# Patient Record
Sex: MALE | Race: OTHER | HISPANIC OR LATINO | URBAN - METROPOLITAN AREA
[De-identification: names, ages, dates, MRNs, and addresses within clinical notes are randomized per-mention and may not be internally consistent; named-entity substitution may affect disease eponyms.]

---

## 2017-01-18 ENCOUNTER — EMERGENCY (EMERGENCY)
Facility: HOSPITAL | Age: 22
LOS: 1 days | Discharge: LEFT BEFORE DISCHARGE | End: 2017-01-18
Attending: EMERGENCY MEDICINE
Payer: SELF-PAY

## 2017-01-18 VITALS
TEMPERATURE: 98 F | RESPIRATION RATE: 24 BRPM | HEART RATE: 119 BPM | SYSTOLIC BLOOD PRESSURE: 144 MMHG | DIASTOLIC BLOOD PRESSURE: 116 MMHG | HEIGHT: 69 IN | OXYGEN SATURATION: 96 % | WEIGHT: 119.93 LBS

## 2017-01-18 DIAGNOSIS — R10.13 EPIGASTRIC PAIN: ICD-10-CM

## 2017-01-18 DIAGNOSIS — R10.9 UNSPECIFIED ABDOMINAL PAIN: ICD-10-CM

## 2017-01-18 DIAGNOSIS — S52.209A UNSPECIFIED FRACTURE OF SHAFT OF UNSPECIFIED ULNA, INITIAL ENCOUNTER FOR CLOSED FRACTURE: Chronic | ICD-10-CM

## 2017-01-18 DIAGNOSIS — Z90.49 ACQUIRED ABSENCE OF OTHER SPECIFIED PARTS OF DIGESTIVE TRACT: Chronic | ICD-10-CM

## 2017-01-18 DIAGNOSIS — Z90.49 ACQUIRED ABSENCE OF OTHER SPECIFIED PARTS OF DIGESTIVE TRACT: ICD-10-CM

## 2017-01-18 LAB
ALBUMIN SERPL ELPH-MCNC: 5 G/DL — SIGNIFICANT CHANGE UP (ref 3.3–5.2)
ALP SERPL-CCNC: 156 U/L — HIGH (ref 40–120)
ALT FLD-CCNC: 11 U/L — SIGNIFICANT CHANGE UP
ANION GAP SERPL CALC-SCNC: 18 MMOL/L — HIGH (ref 5–17)
AST SERPL-CCNC: 34 U/L — SIGNIFICANT CHANGE UP
BASOPHILS # BLD AUTO: 0 K/UL — SIGNIFICANT CHANGE UP (ref 0–0.2)
BASOPHILS NFR BLD AUTO: 0.1 % — SIGNIFICANT CHANGE UP (ref 0–2)
BILIRUB SERPL-MCNC: 0.4 MG/DL — SIGNIFICANT CHANGE UP (ref 0.4–2)
BUN SERPL-MCNC: 12 MG/DL — SIGNIFICANT CHANGE UP (ref 8–20)
CALCIUM SERPL-MCNC: 9.9 MG/DL — SIGNIFICANT CHANGE UP (ref 8.6–10.2)
CHLORIDE SERPL-SCNC: 101 MMOL/L — SIGNIFICANT CHANGE UP (ref 98–107)
CO2 SERPL-SCNC: 22 MMOL/L — SIGNIFICANT CHANGE UP (ref 22–29)
CREAT SERPL-MCNC: 0.61 MG/DL — SIGNIFICANT CHANGE UP (ref 0.5–1.3)
ETHANOL SERPL-MCNC: <10 MG/DL — SIGNIFICANT CHANGE UP
GLUCOSE SERPL-MCNC: 110 MG/DL — SIGNIFICANT CHANGE UP (ref 70–115)
HCT VFR BLD CALC: 44 % — SIGNIFICANT CHANGE UP (ref 42–52)
HGB BLD-MCNC: 15.2 G/DL — SIGNIFICANT CHANGE UP (ref 14–18)
LIDOCAIN IGE QN: 16 U/L — LOW (ref 22–51)
LYMPHOCYTES # BLD AUTO: 11.8 % — LOW (ref 20–55)
LYMPHOCYTES # BLD AUTO: 2.3 K/UL — SIGNIFICANT CHANGE UP (ref 1–4.8)
MAGNESIUM SERPL-MCNC: 1.9 MG/DL — SIGNIFICANT CHANGE UP (ref 1.8–2.5)
MCHC RBC-ENTMCNC: 30 PG — SIGNIFICANT CHANGE UP (ref 27–31)
MCHC RBC-ENTMCNC: 34.5 G/DL — SIGNIFICANT CHANGE UP (ref 32–36)
MCV RBC AUTO: 87 FL — SIGNIFICANT CHANGE UP (ref 80–94)
MONOCYTES # BLD AUTO: 0 K/UL — SIGNIFICANT CHANGE UP (ref 0–0.8)
MONOCYTES NFR BLD AUTO: 0.1 % — LOW (ref 3–10)
NEUTROPHILS # BLD AUTO: 16.8 K/UL — HIGH (ref 1.8–8)
NEUTROPHILS NFR BLD AUTO: 87.6 % — HIGH (ref 37–73)
PLATELET # BLD AUTO: 258 K/UL — SIGNIFICANT CHANGE UP (ref 150–400)
POTASSIUM SERPL-MCNC: 3.8 MMOL/L — SIGNIFICANT CHANGE UP (ref 3.5–5.3)
POTASSIUM SERPL-SCNC: 3.8 MMOL/L — SIGNIFICANT CHANGE UP (ref 3.5–5.3)
PROT SERPL-MCNC: 8 G/DL — SIGNIFICANT CHANGE UP (ref 6.6–8.7)
RBC # BLD: 5.06 M/UL — SIGNIFICANT CHANGE UP (ref 4.6–6.2)
RBC # FLD: 16.5 % — HIGH (ref 11–15.6)
SODIUM SERPL-SCNC: 141 MMOL/L — SIGNIFICANT CHANGE UP (ref 135–145)
WBC # BLD: 19.22 K/UL — HIGH (ref 4.8–10.8)
WBC # FLD AUTO: 19.22 K/UL — HIGH (ref 4.8–10.8)

## 2017-01-18 PROCEDURE — 71020: CPT | Mod: 26

## 2017-01-18 PROCEDURE — 74176 CT ABD & PELVIS W/O CONTRAST: CPT | Mod: 26

## 2017-01-18 PROCEDURE — 99285 EMERGENCY DEPT VISIT HI MDM: CPT

## 2017-01-18 RX ORDER — FAMOTIDINE 10 MG/ML
20 INJECTION INTRAVENOUS ONCE
Qty: 0 | Refills: 0 | Status: COMPLETED | OUTPATIENT
Start: 2017-01-18 | End: 2017-01-18

## 2017-01-18 RX ORDER — DIPHENHYDRAMINE HCL 50 MG
25 CAPSULE ORAL ONCE
Qty: 0 | Refills: 0 | Status: COMPLETED | OUTPATIENT
Start: 2017-01-18 | End: 2017-01-18

## 2017-01-18 RX ORDER — THIAMINE MONONITRATE (VIT B1) 100 MG
100 TABLET ORAL ONCE
Qty: 0 | Refills: 0 | Status: COMPLETED | OUTPATIENT
Start: 2017-01-18 | End: 2017-01-18

## 2017-01-18 RX ORDER — SODIUM CHLORIDE 9 MG/ML
2000 INJECTION INTRAMUSCULAR; INTRAVENOUS; SUBCUTANEOUS ONCE
Qty: 0 | Refills: 0 | Status: COMPLETED | OUTPATIENT
Start: 2017-01-18 | End: 2017-01-18

## 2017-01-18 RX ORDER — SODIUM CHLORIDE 9 MG/ML
1000 INJECTION INTRAMUSCULAR; INTRAVENOUS; SUBCUTANEOUS
Qty: 0 | Refills: 0 | Status: DISCONTINUED | OUTPATIENT
Start: 2017-01-18 | End: 2017-01-22

## 2017-01-18 RX ORDER — HALOPERIDOL DECANOATE 100 MG/ML
5 INJECTION INTRAMUSCULAR ONCE
Qty: 0 | Refills: 0 | Status: COMPLETED | OUTPATIENT
Start: 2017-01-18 | End: 2017-01-18

## 2017-01-18 RX ORDER — KETOROLAC TROMETHAMINE 30 MG/ML
30 SYRINGE (ML) INJECTION ONCE
Qty: 0 | Refills: 0 | Status: DISCONTINUED | OUTPATIENT
Start: 2017-01-18 | End: 2017-01-18

## 2017-01-18 RX ADMIN — Medication 1 MILLIGRAM(S): at 14:16

## 2017-01-18 RX ADMIN — FAMOTIDINE 20 MILLIGRAM(S): 10 INJECTION INTRAVENOUS at 14:16

## 2017-01-18 RX ADMIN — Medication 100 MILLIGRAM(S): at 17:10

## 2017-01-18 RX ADMIN — Medication 0.1 MILLIGRAM(S): at 23:51

## 2017-01-18 RX ADMIN — SODIUM CHLORIDE 200 MILLILITER(S): 9 INJECTION INTRAMUSCULAR; INTRAVENOUS; SUBCUTANEOUS at 14:16

## 2017-01-18 RX ADMIN — SODIUM CHLORIDE 1000 MILLILITER(S): 9 INJECTION INTRAMUSCULAR; INTRAVENOUS; SUBCUTANEOUS at 23:52

## 2017-01-18 RX ADMIN — HALOPERIDOL DECANOATE 5 MILLIGRAM(S): 100 INJECTION INTRAMUSCULAR at 15:18

## 2017-01-18 RX ADMIN — Medication 25 MILLIGRAM(S): at 15:18

## 2017-01-18 NOTE — ED ADULT NURSE REASSESSMENT NOTE - NS ED NURSE REASSESS COMMENT FT1
Pt returned from CT scan, radiology nurse states pt unable to tolerate CT scan due to vomiting and agitation. Pt easily arousable upon ED arrival in no apparent distress. MD at bedside.

## 2017-01-18 NOTE — ED STATDOCS - NS ED MD SCRIBE ATTENDING SCRIBE SECTIONS
VITAL SIGNS( Pullset)/PHYSICAL EXAM/HISTORY OF PRESENT ILLNESS/PAST MEDICAL/SURGICAL/SOCIAL HISTORY/HIV/REVIEW OF SYSTEMS/DISPOSITION

## 2017-01-18 NOTE — ED ADULT NURSE REASSESSMENT NOTE - NS ED NURSE REASSESS COMMENT FT1
pt pulled IV out, benadryl and haldol given emergently IM for aggression and combative behavior as per ER MD Can

## 2017-01-18 NOTE — ED STATDOCS - OBJECTIVE STATEMENT
22 y/o male awoke this morning with severe epigastric pain. No n/v/d. Last BM while in waiting room. Pt denies smoking or drinking, but does use marijuana. On further inspection of pt charts h/o EtOH abuse. Pt reports PMHx ulcers and pancreatitis.

## 2017-01-18 NOTE — ED STATDOCS - PROGRESS NOTE DETAILS
20 y/o male presented to ST procedure room screaming in pain, throwing himself on the floor  and requesting Dilaudid for pain. He reports toradol and morphine will make the pain worse. PT reporting severe epigastric pain. PT care was transferred to Dr. Amaro in the Main ED. pt still complaining of abdominal despite negative CT scan. Pt admits to recently been taking percocet and states that he recently ran out. Father gave phone number: [phone]

## 2017-01-18 NOTE — ED STATDOCS - CONSTITUTIONAL, MLM
normal... Pt attempted to climb the wall. After coaxing pt back onto the bed, pt is hyperventilating and difficult to examine.

## 2017-01-18 NOTE — ED ADULT NURSE NOTE - OBJECTIVE STATEMENT
Pt axox3 speaks english and understands well presenting to the ER with 10/10 epigastric pain. Pt is extremely erratic and agitated, trying to hanging off of light fixture and holding onto the walls, screaming. Pt states when ever he has pain like this at [facility] they give him Dilaudid. Pt offered Toradol and refusing, stating  it makes him "crazy". Abdomen flat, and tender to touch in epigastric area.

## 2017-01-19 VITALS
OXYGEN SATURATION: 96 % | DIASTOLIC BLOOD PRESSURE: 80 MMHG | SYSTOLIC BLOOD PRESSURE: 138 MMHG | TEMPERATURE: 98 F | HEART RATE: 105 BPM | RESPIRATION RATE: 20 BRPM

## 2017-01-19 LAB
AMPHET UR-MCNC: NEGATIVE — SIGNIFICANT CHANGE UP
APPEARANCE UR: CLEAR — SIGNIFICANT CHANGE UP
BARBITURATES UR SCN-MCNC: NEGATIVE — SIGNIFICANT CHANGE UP
BENZODIAZ UR-MCNC: POSITIVE
BILIRUB UR-MCNC: NEGATIVE — SIGNIFICANT CHANGE UP
COCAINE METAB.OTHER UR-MCNC: NEGATIVE — SIGNIFICANT CHANGE UP
COLOR SPEC: YELLOW — SIGNIFICANT CHANGE UP
DIFF PNL FLD: NEGATIVE — SIGNIFICANT CHANGE UP
GLUCOSE UR QL: NEGATIVE MG/DL — SIGNIFICANT CHANGE UP
HCT VFR BLD CALC: 41.6 % — LOW (ref 42–52)
HGB BLD-MCNC: 14 G/DL — SIGNIFICANT CHANGE UP (ref 14–18)
KETONES UR-MCNC: NEGATIVE — SIGNIFICANT CHANGE UP
LEUKOCYTE ESTERASE UR-ACNC: NEGATIVE — SIGNIFICANT CHANGE UP
LYMPHOCYTES # BLD AUTO: 1.6 K/UL — SIGNIFICANT CHANGE UP (ref 1–4.8)
LYMPHOCYTES # BLD AUTO: 11.4 % — LOW (ref 20–55)
MCHC RBC-ENTMCNC: 29.9 PG — SIGNIFICANT CHANGE UP (ref 27–31)
MCHC RBC-ENTMCNC: 33.7 G/DL — SIGNIFICANT CHANGE UP (ref 32–36)
MCV RBC AUTO: 88.7 FL — SIGNIFICANT CHANGE UP (ref 80–94)
METHADONE UR-MCNC: NEGATIVE — SIGNIFICANT CHANGE UP
MONOCYTES # BLD AUTO: 0.9 K/UL — HIGH (ref 0–0.8)
MONOCYTES NFR BLD AUTO: 6.5 % — SIGNIFICANT CHANGE UP (ref 3–10)
NEUTROPHILS # BLD AUTO: 11.2 K/UL — HIGH (ref 1.8–8)
NEUTROPHILS NFR BLD AUTO: 81.9 % — HIGH (ref 37–73)
NITRITE UR-MCNC: NEGATIVE — SIGNIFICANT CHANGE UP
OPIATES UR-MCNC: NEGATIVE — SIGNIFICANT CHANGE UP
PCP SPEC-MCNC: SIGNIFICANT CHANGE UP
PCP UR-MCNC: NEGATIVE — SIGNIFICANT CHANGE UP
PH UR: 6 — SIGNIFICANT CHANGE UP (ref 4.8–8)
PLATELET # BLD AUTO: 220 K/UL — SIGNIFICANT CHANGE UP (ref 150–400)
PROT UR-MCNC: NEGATIVE MG/DL — SIGNIFICANT CHANGE UP
RBC # BLD: 4.69 M/UL — SIGNIFICANT CHANGE UP (ref 4.6–6.2)
RBC # FLD: 17.1 % — HIGH (ref 11–15.6)
SP GR SPEC: 1.02 — SIGNIFICANT CHANGE UP (ref 1.01–1.02)
THC UR QL: POSITIVE
UROBILINOGEN FLD QL: NEGATIVE MG/DL — SIGNIFICANT CHANGE UP
WBC # BLD: 13.63 K/UL — HIGH (ref 4.8–10.8)
WBC # FLD AUTO: 13.63 K/UL — HIGH (ref 4.8–10.8)

## 2017-01-19 PROCEDURE — 76705 ECHO EXAM OF ABDOMEN: CPT | Mod: 26

## 2017-01-19 RX ADMIN — Medication 20 MILLIGRAM(S): at 07:29

## 2017-01-19 RX ADMIN — SODIUM CHLORIDE 200 MILLILITER(S): 9 INJECTION INTRAMUSCULAR; INTRAVENOUS; SUBCUTANEOUS at 07:31

## 2017-01-19 RX ADMIN — Medication 30 MILLILITER(S): at 07:29

## 2017-01-19 RX ADMIN — SODIUM CHLORIDE 200 MILLILITER(S): 9 INJECTION INTRAMUSCULAR; INTRAVENOUS; SUBCUTANEOUS at 00:41

## 2017-01-19 NOTE — ED PROVIDER NOTE - PROGRESS NOTE DETAILS
Pt awake and alert at his time c/o recurrent pain.  Pt now states that he has not taken any Percocet in 3 months.  On re-exam abd scaphoid with mild epigastric tenderness to palp. Pt requesting IV narcotics, "just one dose".  Will give Maalox and Bentyl and re-eval s/o to me severe upper abd pain, vomiting unclear etiology, patient previously given Gi refer rodo-has info at home but hasn't followed up - after US patient says he wasn't to leave and s/o AMA because he has a ride home

## 2017-01-19 NOTE — ED PROVIDER NOTE - OBJECTIVE STATEMENT
22 yo pmh etoh abuse, pancreatitis comes to ed with abdominal pain and vomiting; pt requesting pain medication;denies fever, chills, and diarrhea;

## 2017-06-03 ENCOUNTER — INPATIENT (INPATIENT)
Facility: HOSPITAL | Age: 22
LOS: 3 days | Discharge: HOME HEALTH | DRG: 392 | End: 2017-06-07
Attending: INTERNAL MEDICINE | Admitting: HOSPITALIST
Payer: MEDICAID

## 2017-06-03 VITALS
DIASTOLIC BLOOD PRESSURE: 80 MMHG | HEIGHT: 66 IN | WEIGHT: 145.06 LBS | RESPIRATION RATE: 16 BRPM | OXYGEN SATURATION: 99 % | HEART RATE: 82 BPM | SYSTOLIC BLOOD PRESSURE: 136 MMHG | TEMPERATURE: 97 F

## 2017-06-03 DIAGNOSIS — S52.209A UNSPECIFIED FRACTURE OF SHAFT OF UNSPECIFIED ULNA, INITIAL ENCOUNTER FOR CLOSED FRACTURE: Chronic | ICD-10-CM

## 2017-06-03 DIAGNOSIS — Z90.49 ACQUIRED ABSENCE OF OTHER SPECIFIED PARTS OF DIGESTIVE TRACT: Chronic | ICD-10-CM

## 2017-06-03 DIAGNOSIS — R10.9 UNSPECIFIED ABDOMINAL PAIN: ICD-10-CM

## 2017-06-03 LAB
ALBUMIN SERPL ELPH-MCNC: 5.2 G/DL — SIGNIFICANT CHANGE UP (ref 3.3–5.2)
ALP SERPL-CCNC: 134 U/L — HIGH (ref 40–120)
ALT FLD-CCNC: 14 U/L — SIGNIFICANT CHANGE UP
AMPHET UR-MCNC: NEGATIVE — SIGNIFICANT CHANGE UP
ANION GAP SERPL CALC-SCNC: 21 MMOL/L — HIGH (ref 5–17)
APPEARANCE UR: CLEAR — SIGNIFICANT CHANGE UP
AST SERPL-CCNC: 52 U/L — HIGH
BARBITURATES UR SCN-MCNC: NEGATIVE — SIGNIFICANT CHANGE UP
BENZODIAZ UR-MCNC: NEGATIVE — SIGNIFICANT CHANGE UP
BILIRUB SERPL-MCNC: 0.7 MG/DL — SIGNIFICANT CHANGE UP (ref 0.4–2)
BILIRUB UR-MCNC: NEGATIVE — SIGNIFICANT CHANGE UP
BUN SERPL-MCNC: 14 MG/DL — SIGNIFICANT CHANGE UP (ref 8–20)
CALCIUM SERPL-MCNC: 10.2 MG/DL — SIGNIFICANT CHANGE UP (ref 8.6–10.2)
CHLORIDE SERPL-SCNC: 98 MMOL/L — SIGNIFICANT CHANGE UP (ref 98–107)
CO2 SERPL-SCNC: 20 MMOL/L — LOW (ref 22–29)
COCAINE METAB.OTHER UR-MCNC: NEGATIVE — SIGNIFICANT CHANGE UP
COLOR SPEC: YELLOW — SIGNIFICANT CHANGE UP
CREAT SERPL-MCNC: 0.59 MG/DL — SIGNIFICANT CHANGE UP (ref 0.5–1.3)
DIFF PNL FLD: NEGATIVE — SIGNIFICANT CHANGE UP
EPI CELLS # UR: SIGNIFICANT CHANGE UP
GLUCOSE SERPL-MCNC: 131 MG/DL — HIGH (ref 70–115)
GLUCOSE UR QL: NEGATIVE MG/DL — SIGNIFICANT CHANGE UP
HCT VFR BLD CALC: 42.4 % — SIGNIFICANT CHANGE UP (ref 42–52)
HCT VFR BLD CALC: 44.5 % — SIGNIFICANT CHANGE UP (ref 42–52)
HGB BLD-MCNC: 14.9 G/DL — SIGNIFICANT CHANGE UP (ref 14–18)
HGB BLD-MCNC: 15.9 G/DL — SIGNIFICANT CHANGE UP (ref 14–18)
KETONES UR-MCNC: ABNORMAL
LEUKOCYTE ESTERASE UR-ACNC: NEGATIVE — SIGNIFICANT CHANGE UP
LIDOCAIN IGE QN: 10 U/L — LOW (ref 22–51)
MCHC RBC-ENTMCNC: 31.6 PG — HIGH (ref 27–31)
MCHC RBC-ENTMCNC: 32.1 PG — HIGH (ref 27–31)
MCHC RBC-ENTMCNC: 35.1 G/DL — SIGNIFICANT CHANGE UP (ref 32–36)
MCHC RBC-ENTMCNC: 35.7 G/DL — SIGNIFICANT CHANGE UP (ref 32–36)
MCV RBC AUTO: 89.9 FL — SIGNIFICANT CHANGE UP (ref 80–94)
MCV RBC AUTO: 90 FL — SIGNIFICANT CHANGE UP (ref 80–94)
METHADONE UR-MCNC: NEGATIVE — SIGNIFICANT CHANGE UP
NITRITE UR-MCNC: NEGATIVE — SIGNIFICANT CHANGE UP
OPIATES UR-MCNC: NEGATIVE — SIGNIFICANT CHANGE UP
PCP SPEC-MCNC: SIGNIFICANT CHANGE UP
PCP UR-MCNC: NEGATIVE — SIGNIFICANT CHANGE UP
PH UR: 7 — SIGNIFICANT CHANGE UP (ref 5–8)
PLATELET # BLD AUTO: 230 K/UL — SIGNIFICANT CHANGE UP (ref 150–400)
PLATELET # BLD AUTO: 289 K/UL — SIGNIFICANT CHANGE UP (ref 150–400)
POTASSIUM SERPL-MCNC: 4.4 MMOL/L — SIGNIFICANT CHANGE UP (ref 3.5–5.3)
POTASSIUM SERPL-SCNC: 4.4 MMOL/L — SIGNIFICANT CHANGE UP (ref 3.5–5.3)
PROT SERPL-MCNC: 8.4 G/DL — SIGNIFICANT CHANGE UP (ref 6.6–8.7)
PROT UR-MCNC: 15 MG/DL
RBC # BLD: 4.71 M/UL — SIGNIFICANT CHANGE UP (ref 4.6–6.2)
RBC # BLD: 4.95 M/UL — SIGNIFICANT CHANGE UP (ref 4.6–6.2)
RBC # FLD: 14.3 % — SIGNIFICANT CHANGE UP (ref 11–15.6)
RBC # FLD: 14.4 % — SIGNIFICANT CHANGE UP (ref 11–15.6)
RBC CASTS # UR COMP ASSIST: NEGATIVE /HPF — SIGNIFICANT CHANGE UP (ref 0–4)
SODIUM SERPL-SCNC: 139 MMOL/L — SIGNIFICANT CHANGE UP (ref 135–145)
SP GR SPEC: 1 — LOW (ref 1.01–1.02)
THC UR QL: POSITIVE
UROBILINOGEN FLD QL: NEGATIVE MG/DL — SIGNIFICANT CHANGE UP
WBC # BLD: 25.2 K/UL — HIGH (ref 4.8–10.8)
WBC # BLD: 28.9 K/UL — HIGH (ref 4.8–10.8)
WBC # FLD AUTO: 25.2 K/UL — HIGH (ref 4.8–10.8)
WBC # FLD AUTO: 28.9 K/UL — HIGH (ref 4.8–10.8)
WBC UR QL: NEGATIVE — SIGNIFICANT CHANGE UP

## 2017-06-03 PROCEDURE — 74177 CT ABD & PELVIS W/CONTRAST: CPT | Mod: 26

## 2017-06-03 PROCEDURE — 71010: CPT | Mod: 26

## 2017-06-03 PROCEDURE — 99284 EMERGENCY DEPT VISIT MOD MDM: CPT

## 2017-06-03 RX ORDER — ONDANSETRON 8 MG/1
4 TABLET, FILM COATED ORAL ONCE
Qty: 0 | Refills: 0 | Status: COMPLETED | OUTPATIENT
Start: 2017-06-03 | End: 2017-06-03

## 2017-06-03 RX ORDER — HYDROMORPHONE HYDROCHLORIDE 2 MG/ML
1 INJECTION INTRAMUSCULAR; INTRAVENOUS; SUBCUTANEOUS ONCE
Qty: 0 | Refills: 0 | Status: DISCONTINUED | OUTPATIENT
Start: 2017-06-03 | End: 2017-06-03

## 2017-06-03 RX ORDER — SODIUM CHLORIDE 9 MG/ML
1000 INJECTION INTRAMUSCULAR; INTRAVENOUS; SUBCUTANEOUS ONCE
Qty: 0 | Refills: 0 | Status: COMPLETED | OUTPATIENT
Start: 2017-06-03 | End: 2017-06-03

## 2017-06-03 RX ORDER — KETOROLAC TROMETHAMINE 30 MG/ML
30 SYRINGE (ML) INJECTION ONCE
Qty: 0 | Refills: 0 | Status: DISCONTINUED | OUTPATIENT
Start: 2017-06-03 | End: 2017-06-03

## 2017-06-03 RX ORDER — PANTOPRAZOLE SODIUM 20 MG/1
40 TABLET, DELAYED RELEASE ORAL EVERY 12 HOURS
Qty: 0 | Refills: 0 | Status: DISCONTINUED | OUTPATIENT
Start: 2017-06-03 | End: 2017-06-07

## 2017-06-03 RX ORDER — METOCLOPRAMIDE HCL 10 MG
10 TABLET ORAL ONCE
Qty: 0 | Refills: 0 | Status: COMPLETED | OUTPATIENT
Start: 2017-06-03 | End: 2017-06-03

## 2017-06-03 RX ORDER — FAMOTIDINE 10 MG/ML
20 INJECTION INTRAVENOUS ONCE
Qty: 0 | Refills: 0 | Status: COMPLETED | OUTPATIENT
Start: 2017-06-03 | End: 2017-06-03

## 2017-06-03 RX ADMIN — Medication 1 MILLIGRAM(S): at 16:18

## 2017-06-03 RX ADMIN — Medication 30 MILLIGRAM(S): at 15:45

## 2017-06-03 RX ADMIN — HYDROMORPHONE HYDROCHLORIDE 1 MILLIGRAM(S): 2 INJECTION INTRAMUSCULAR; INTRAVENOUS; SUBCUTANEOUS at 15:45

## 2017-06-03 RX ADMIN — ONDANSETRON 4 MILLIGRAM(S): 8 TABLET, FILM COATED ORAL at 15:45

## 2017-06-03 RX ADMIN — HYDROMORPHONE HYDROCHLORIDE 1 MILLIGRAM(S): 2 INJECTION INTRAMUSCULAR; INTRAVENOUS; SUBCUTANEOUS at 16:18

## 2017-06-03 RX ADMIN — HYDROMORPHONE HYDROCHLORIDE 1 MILLIGRAM(S): 2 INJECTION INTRAMUSCULAR; INTRAVENOUS; SUBCUTANEOUS at 16:30

## 2017-06-03 RX ADMIN — ONDANSETRON 4 MILLIGRAM(S): 8 TABLET, FILM COATED ORAL at 17:00

## 2017-06-03 RX ADMIN — SODIUM CHLORIDE 500 MILLILITER(S): 9 INJECTION INTRAMUSCULAR; INTRAVENOUS; SUBCUTANEOUS at 15:45

## 2017-06-03 RX ADMIN — Medication 30 MILLIGRAM(S): at 16:18

## 2017-06-03 RX ADMIN — Medication 10 MILLIGRAM(S): at 22:26

## 2017-06-03 RX ADMIN — Medication 1 MILLIGRAM(S): at 17:30

## 2017-06-03 RX ADMIN — HYDROMORPHONE HYDROCHLORIDE 1 MILLIGRAM(S): 2 INJECTION INTRAMUSCULAR; INTRAVENOUS; SUBCUTANEOUS at 22:21

## 2017-06-03 RX ADMIN — FAMOTIDINE 20 MILLIGRAM(S): 10 INJECTION INTRAVENOUS at 15:45

## 2017-06-03 NOTE — ED ADULT NURSE NOTE - OBJECTIVE STATEMENT
pt screaming, groaning, hanging from poles on ceiling. anxious, diaphoretic, agitated. "I AM HAVING PAIN TO THE TOP OF MY STOMACH." pt has dried vomit on clothing. pacing in hallway. a and o x3, reports bearing down make him feel better, refuses to answer any other questions at this time. breathing even and unlabored. lungs cta. cap refill brisk. skin w/d/i. nsr to afib on cm. abdomen soft nontender nondistended. will continue to monitor.

## 2017-06-03 NOTE — ED PROVIDER NOTE - OBJECTIVE STATEMENT
20 yo male presents c/o abdominal pain which started earlier today associated with nausea and vomiting, pt pointing to upper abdomen. denies fever/cp or sob    pt with pmh of gallbladder surgery few yrs ago

## 2017-06-03 NOTE — ED PROVIDER NOTE - PROGRESS NOTE DETAILS
pt writhing in stretcher and c/o abdominal pain does not want to lie down, will remedicate for pain and agitation, awaiting labs, cxr no free air,ct scan pending pt requires emergent ct scan with iv contrast, pt unable to consent will need emergent test done pt with intractable pain and vomiting despite nml ct will admit spoke with  will admit

## 2017-06-04 DIAGNOSIS — R10.9 UNSPECIFIED ABDOMINAL PAIN: ICD-10-CM

## 2017-06-04 DIAGNOSIS — M62.82 RHABDOMYOLYSIS: ICD-10-CM

## 2017-06-04 DIAGNOSIS — L98.9 DISORDER OF THE SKIN AND SUBCUTANEOUS TISSUE, UNSPECIFIED: ICD-10-CM

## 2017-06-04 DIAGNOSIS — R74.0 NONSPECIFIC ELEVATION OF LEVELS OF TRANSAMINASE AND LACTIC ACID DEHYDROGENASE [LDH]: ICD-10-CM

## 2017-06-04 DIAGNOSIS — Z29.9 ENCOUNTER FOR PROPHYLACTIC MEASURES, UNSPECIFIED: ICD-10-CM

## 2017-06-04 DIAGNOSIS — L02.419 CUTANEOUS ABSCESS OF LIMB, UNSPECIFIED: ICD-10-CM

## 2017-06-04 DIAGNOSIS — K29.70 GASTRITIS, UNSPECIFIED, WITHOUT BLEEDING: ICD-10-CM

## 2017-06-04 DIAGNOSIS — D72.829 ELEVATED WHITE BLOOD CELL COUNT, UNSPECIFIED: ICD-10-CM

## 2017-06-04 DIAGNOSIS — F19.10 OTHER PSYCHOACTIVE SUBSTANCE ABUSE, UNCOMPLICATED: ICD-10-CM

## 2017-06-04 DIAGNOSIS — A41.9 SEPSIS, UNSPECIFIED ORGANISM: ICD-10-CM

## 2017-06-04 LAB
ALBUMIN SERPL ELPH-MCNC: 4.1 G/DL — SIGNIFICANT CHANGE UP (ref 3.3–5.2)
ALBUMIN SERPL ELPH-MCNC: 4.3 G/DL — SIGNIFICANT CHANGE UP (ref 3.3–5.2)
ALP SERPL-CCNC: 96 U/L — SIGNIFICANT CHANGE UP (ref 40–120)
ALP SERPL-CCNC: 97 U/L — SIGNIFICANT CHANGE UP (ref 40–120)
ALT FLD-CCNC: 13 U/L — SIGNIFICANT CHANGE UP
ALT FLD-CCNC: 14 U/L — SIGNIFICANT CHANGE UP
ANION GAP SERPL CALC-SCNC: 10 MMOL/L — SIGNIFICANT CHANGE UP (ref 5–17)
ANION GAP SERPL CALC-SCNC: 13 MMOL/L — SIGNIFICANT CHANGE UP (ref 5–17)
APPEARANCE UR: CLEAR — SIGNIFICANT CHANGE UP
AST SERPL-CCNC: 70 U/L — HIGH
AST SERPL-CCNC: 72 U/L — HIGH
BASOPHILS # BLD AUTO: 0 K/UL — SIGNIFICANT CHANGE UP (ref 0–0.2)
BASOPHILS NFR BLD AUTO: 0.1 % — SIGNIFICANT CHANGE UP (ref 0–2)
BILIRUB SERPL-MCNC: 0.5 MG/DL — SIGNIFICANT CHANGE UP (ref 0.4–2)
BILIRUB SERPL-MCNC: 0.6 MG/DL — SIGNIFICANT CHANGE UP (ref 0.4–2)
BILIRUB UR-MCNC: NEGATIVE — SIGNIFICANT CHANGE UP
BUN SERPL-MCNC: 12 MG/DL — SIGNIFICANT CHANGE UP (ref 8–20)
BUN SERPL-MCNC: 13 MG/DL — SIGNIFICANT CHANGE UP (ref 8–20)
CALCIUM SERPL-MCNC: 8.6 MG/DL — SIGNIFICANT CHANGE UP (ref 8.6–10.2)
CALCIUM SERPL-MCNC: 9.1 MG/DL — SIGNIFICANT CHANGE UP (ref 8.6–10.2)
CHLORIDE SERPL-SCNC: 101 MMOL/L — SIGNIFICANT CHANGE UP (ref 98–107)
CHLORIDE SERPL-SCNC: 101 MMOL/L — SIGNIFICANT CHANGE UP (ref 98–107)
CK MB CFR SERPL CALC: 35.4 NG/ML — HIGH (ref 0–6.7)
CK MB CFR SERPL CALC: 47.3 NG/ML — HIGH (ref 0–6.7)
CK SERPL-CCNC: 1800 U/L — HIGH (ref 30–200)
CK SERPL-CCNC: 2238 U/L — HIGH (ref 30–200)
CO2 SERPL-SCNC: 24 MMOL/L — SIGNIFICANT CHANGE UP (ref 22–29)
CO2 SERPL-SCNC: 26 MMOL/L — SIGNIFICANT CHANGE UP (ref 22–29)
COLOR SPEC: SIGNIFICANT CHANGE UP
CREAT SERPL-MCNC: 0.52 MG/DL — SIGNIFICANT CHANGE UP (ref 0.5–1.3)
CREAT SERPL-MCNC: 0.52 MG/DL — SIGNIFICANT CHANGE UP (ref 0.5–1.3)
CRP SERPL-MCNC: 4.5 MG/DL — HIGH (ref 0–0.4)
DIFF PNL FLD: NEGATIVE — SIGNIFICANT CHANGE UP
EOSINOPHIL # BLD AUTO: 0 K/UL — SIGNIFICANT CHANGE UP (ref 0–0.5)
EOSINOPHIL NFR BLD AUTO: 0.1 % — SIGNIFICANT CHANGE UP (ref 0–5)
ERYTHROCYTE [SEDIMENTATION RATE] IN BLOOD: 6 MM/HR — SIGNIFICANT CHANGE UP (ref 0–20)
GLUCOSE SERPL-MCNC: 107 MG/DL — SIGNIFICANT CHANGE UP (ref 70–115)
GLUCOSE SERPL-MCNC: 115 MG/DL — SIGNIFICANT CHANGE UP (ref 70–115)
GLUCOSE UR QL: NEGATIVE MG/DL — SIGNIFICANT CHANGE UP
HAV IGM SER-ACNC: SIGNIFICANT CHANGE UP
HBV CORE IGM SER-ACNC: SIGNIFICANT CHANGE UP
HBV SURFACE AG SER-ACNC: SIGNIFICANT CHANGE UP
HCT VFR BLD CALC: 36.6 % — LOW (ref 42–52)
HCT VFR BLD CALC: 37 % — LOW (ref 42–52)
HCV AB S/CO SERPL IA: 0.11 S/CO — SIGNIFICANT CHANGE UP
HCV AB SERPL-IMP: SIGNIFICANT CHANGE UP
HGB BLD-MCNC: 12.6 G/DL — LOW (ref 14–18)
HGB BLD-MCNC: 12.8 G/DL — LOW (ref 14–18)
HIV 1 & 2 AB SERPL IA.RAPID: SIGNIFICANT CHANGE UP
KETONES UR-MCNC: ABNORMAL
LACTATE BLDV-MCNC: 0.9 MMOL/L — SIGNIFICANT CHANGE UP (ref 0.5–2)
LACTATE BLDV-MCNC: 2.4 MMOL/L — HIGH (ref 0.5–2)
LEUKOCYTE ESTERASE UR-ACNC: NEGATIVE — SIGNIFICANT CHANGE UP
LIDOCAIN IGE QN: 37 U/L — SIGNIFICANT CHANGE UP (ref 22–51)
LYMPHOCYTES # BLD AUTO: 15 % — LOW (ref 20–55)
LYMPHOCYTES # BLD AUTO: 2.4 K/UL — SIGNIFICANT CHANGE UP (ref 1–4.8)
LYMPHOCYTES # BLD AUTO: 3 % — LOW (ref 20–55)
MAGNESIUM SERPL-MCNC: 1.9 MG/DL — SIGNIFICANT CHANGE UP (ref 1.6–2.6)
MCHC RBC-ENTMCNC: 31.1 PG — HIGH (ref 27–31)
MCHC RBC-ENTMCNC: 31.1 PG — HIGH (ref 27–31)
MCHC RBC-ENTMCNC: 34.4 G/DL — SIGNIFICANT CHANGE UP (ref 32–36)
MCHC RBC-ENTMCNC: 34.6 G/DL — SIGNIFICANT CHANGE UP (ref 32–36)
MCV RBC AUTO: 89.8 FL — SIGNIFICANT CHANGE UP (ref 80–94)
MCV RBC AUTO: 90.4 FL — SIGNIFICANT CHANGE UP (ref 80–94)
MONOCYTES # BLD AUTO: 1.1 K/UL — HIGH (ref 0–0.8)
MONOCYTES NFR BLD AUTO: 1 % — LOW (ref 3–10)
MONOCYTES NFR BLD AUTO: 6.8 % — SIGNIFICANT CHANGE UP (ref 3–10)
NEUTROPHILS # BLD AUTO: 12.3 K/UL — HIGH (ref 1.8–8)
NEUTROPHILS NFR BLD AUTO: 77.7 % — HIGH (ref 37–73)
NEUTROPHILS NFR BLD AUTO: 96 % — HIGH (ref 37–73)
NITRITE UR-MCNC: NEGATIVE — SIGNIFICANT CHANGE UP
PH UR: 6 — SIGNIFICANT CHANGE UP (ref 5–8)
PHOSPHATE SERPL-MCNC: 3.5 MG/DL — SIGNIFICANT CHANGE UP (ref 2.4–4.7)
PLAT MORPH BLD: NORMAL — SIGNIFICANT CHANGE UP
PLATELET # BLD AUTO: 206 K/UL — SIGNIFICANT CHANGE UP (ref 150–400)
PLATELET # BLD AUTO: 223 K/UL — SIGNIFICANT CHANGE UP (ref 150–400)
POTASSIUM SERPL-MCNC: 3.3 MMOL/L — LOW (ref 3.5–5.3)
POTASSIUM SERPL-MCNC: 3.5 MMOL/L — SIGNIFICANT CHANGE UP (ref 3.5–5.3)
POTASSIUM SERPL-SCNC: 3.3 MMOL/L — LOW (ref 3.5–5.3)
POTASSIUM SERPL-SCNC: 3.5 MMOL/L — SIGNIFICANT CHANGE UP (ref 3.5–5.3)
PROCALCITONIN SERPL-MCNC: 0.94 NG/ML — HIGH (ref 0–0.04)
PROT SERPL-MCNC: 6.3 G/DL — LOW (ref 6.6–8.7)
PROT SERPL-MCNC: 6.7 G/DL — SIGNIFICANT CHANGE UP (ref 6.6–8.7)
PROT UR-MCNC: NEGATIVE MG/DL — SIGNIFICANT CHANGE UP
RBC # BLD: 4.05 M/UL — LOW (ref 4.6–6.2)
RBC # BLD: 4.12 M/UL — LOW (ref 4.6–6.2)
RBC # FLD: 14.7 % — SIGNIFICANT CHANGE UP (ref 11–15.6)
RBC # FLD: 14.8 % — SIGNIFICANT CHANGE UP (ref 11–15.6)
RBC BLD AUTO: NORMAL — SIGNIFICANT CHANGE UP
SODIUM SERPL-SCNC: 137 MMOL/L — SIGNIFICANT CHANGE UP (ref 135–145)
SODIUM SERPL-SCNC: 138 MMOL/L — SIGNIFICANT CHANGE UP (ref 135–145)
SP GR SPEC: 1.01 — SIGNIFICANT CHANGE UP (ref 1.01–1.02)
TROPONIN T SERPL-MCNC: <0.01 NG/ML — SIGNIFICANT CHANGE UP (ref 0–0.06)
UROBILINOGEN FLD QL: NEGATIVE MG/DL — SIGNIFICANT CHANGE UP
WBC # BLD: 15.8 K/UL — HIGH (ref 4.8–10.8)
WBC # BLD: 17.9 K/UL — HIGH (ref 4.8–10.8)
WBC # FLD AUTO: 15.8 K/UL — HIGH (ref 4.8–10.8)
WBC # FLD AUTO: 17.9 K/UL — HIGH (ref 4.8–10.8)

## 2017-06-04 PROCEDURE — 70450 CT HEAD/BRAIN W/O DYE: CPT | Mod: 26

## 2017-06-04 PROCEDURE — 99223 1ST HOSP IP/OBS HIGH 75: CPT | Mod: 25

## 2017-06-04 PROCEDURE — 99223 1ST HOSP IP/OBS HIGH 75: CPT

## 2017-06-04 PROCEDURE — 73562 X-RAY EXAM OF KNEE 3: CPT | Mod: 26,RT

## 2017-06-04 RX ORDER — CEFAZOLIN SODIUM 1 G
1000 VIAL (EA) INJECTION ONCE
Qty: 0 | Refills: 0 | Status: COMPLETED | OUTPATIENT
Start: 2017-06-04 | End: 2017-06-04

## 2017-06-04 RX ORDER — SODIUM CHLORIDE 9 MG/ML
1000 INJECTION INTRAMUSCULAR; INTRAVENOUS; SUBCUTANEOUS
Qty: 0 | Refills: 0 | Status: DISCONTINUED | OUTPATIENT
Start: 2017-06-04 | End: 2017-06-04

## 2017-06-04 RX ORDER — CEFAZOLIN SODIUM 1 G
VIAL (EA) INJECTION
Qty: 0 | Refills: 0 | Status: DISCONTINUED | OUTPATIENT
Start: 2017-06-04 | End: 2017-06-05

## 2017-06-04 RX ORDER — SODIUM CHLORIDE 9 MG/ML
100 INJECTION INTRAMUSCULAR; INTRAVENOUS; SUBCUTANEOUS ONCE
Qty: 0 | Refills: 0 | Status: DISCONTINUED | OUTPATIENT
Start: 2017-06-04 | End: 2017-06-04

## 2017-06-04 RX ORDER — HYDROMORPHONE HYDROCHLORIDE 2 MG/ML
0.5 INJECTION INTRAMUSCULAR; INTRAVENOUS; SUBCUTANEOUS
Qty: 0 | Refills: 0 | Status: DISCONTINUED | OUTPATIENT
Start: 2017-06-04 | End: 2017-06-05

## 2017-06-04 RX ORDER — SODIUM CHLORIDE 9 MG/ML
1000 INJECTION INTRAMUSCULAR; INTRAVENOUS; SUBCUTANEOUS ONCE
Qty: 0 | Refills: 0 | Status: COMPLETED | OUTPATIENT
Start: 2017-06-04 | End: 2017-06-04

## 2017-06-04 RX ORDER — THIAMINE MONONITRATE (VIT B1) 100 MG
100 TABLET ORAL DAILY
Qty: 0 | Refills: 0 | Status: COMPLETED | OUTPATIENT
Start: 2017-06-04 | End: 2017-06-06

## 2017-06-04 RX ORDER — HYDROMORPHONE HYDROCHLORIDE 2 MG/ML
1 INJECTION INTRAMUSCULAR; INTRAVENOUS; SUBCUTANEOUS
Qty: 0 | Refills: 0 | Status: DISCONTINUED | OUTPATIENT
Start: 2017-06-04 | End: 2017-06-05

## 2017-06-04 RX ORDER — SODIUM CHLORIDE 9 MG/ML
1000 INJECTION, SOLUTION INTRAVENOUS
Qty: 0 | Refills: 0 | Status: DISCONTINUED | OUTPATIENT
Start: 2017-06-04 | End: 2017-06-05

## 2017-06-04 RX ORDER — CEFAZOLIN SODIUM 1 G
1000 VIAL (EA) INJECTION EVERY 8 HOURS
Qty: 0 | Refills: 0 | Status: DISCONTINUED | OUTPATIENT
Start: 2017-06-04 | End: 2017-06-05

## 2017-06-04 RX ORDER — HYDROMORPHONE HYDROCHLORIDE 2 MG/ML
1 INJECTION INTRAMUSCULAR; INTRAVENOUS; SUBCUTANEOUS
Qty: 0 | Refills: 0 | Status: DISCONTINUED | OUTPATIENT
Start: 2017-06-04 | End: 2017-06-04

## 2017-06-04 RX ORDER — ONDANSETRON 8 MG/1
4 TABLET, FILM COATED ORAL EVERY 6 HOURS
Qty: 0 | Refills: 0 | Status: DISCONTINUED | OUTPATIENT
Start: 2017-06-04 | End: 2017-06-07

## 2017-06-04 RX ORDER — SODIUM CHLORIDE 9 MG/ML
1000 INJECTION, SOLUTION INTRAVENOUS
Qty: 0 | Refills: 0 | Status: DISCONTINUED | OUTPATIENT
Start: 2017-06-04 | End: 2017-06-04

## 2017-06-04 RX ORDER — POTASSIUM CHLORIDE 20 MEQ
40 PACKET (EA) ORAL EVERY 4 HOURS
Qty: 0 | Refills: 0 | Status: COMPLETED | OUTPATIENT
Start: 2017-06-04 | End: 2017-06-04

## 2017-06-04 RX ORDER — HYDROMORPHONE HYDROCHLORIDE 2 MG/ML
0.5 INJECTION INTRAMUSCULAR; INTRAVENOUS; SUBCUTANEOUS
Qty: 0 | Refills: 0 | Status: DISCONTINUED | OUTPATIENT
Start: 2017-06-04 | End: 2017-06-04

## 2017-06-04 RX ORDER — THIAMINE MONONITRATE (VIT B1) 100 MG
100 TABLET ORAL DAILY
Qty: 0 | Refills: 0 | Status: DISCONTINUED | OUTPATIENT
Start: 2017-06-04 | End: 2017-06-04

## 2017-06-04 RX ORDER — FOLIC ACID 0.8 MG
1 TABLET ORAL DAILY
Qty: 0 | Refills: 0 | Status: DISCONTINUED | OUTPATIENT
Start: 2017-06-04 | End: 2017-06-04

## 2017-06-04 RX ORDER — ENOXAPARIN SODIUM 100 MG/ML
40 INJECTION SUBCUTANEOUS DAILY
Qty: 0 | Refills: 0 | Status: DISCONTINUED | OUTPATIENT
Start: 2017-06-04 | End: 2017-06-07

## 2017-06-04 RX ORDER — THIAMINE MONONITRATE (VIT B1) 100 MG
100 TABLET ORAL ONCE
Qty: 0 | Refills: 0 | Status: DISCONTINUED | OUTPATIENT
Start: 2017-06-04 | End: 2017-06-04

## 2017-06-04 RX ADMIN — SODIUM CHLORIDE 100 MILLILITER(S): 9 INJECTION INTRAMUSCULAR; INTRAVENOUS; SUBCUTANEOUS at 00:24

## 2017-06-04 RX ADMIN — HYDROMORPHONE HYDROCHLORIDE 0.5 MILLIGRAM(S): 2 INJECTION INTRAMUSCULAR; INTRAVENOUS; SUBCUTANEOUS at 18:42

## 2017-06-04 RX ADMIN — SODIUM CHLORIDE 2000 MILLILITER(S): 9 INJECTION INTRAMUSCULAR; INTRAVENOUS; SUBCUTANEOUS at 02:15

## 2017-06-04 RX ADMIN — HYDROMORPHONE HYDROCHLORIDE 1 MILLIGRAM(S): 2 INJECTION INTRAMUSCULAR; INTRAVENOUS; SUBCUTANEOUS at 06:37

## 2017-06-04 RX ADMIN — Medication 100 MILLIGRAM(S): at 04:23

## 2017-06-04 RX ADMIN — Medication 40 MILLIEQUIVALENT(S): at 17:40

## 2017-06-04 RX ADMIN — HYDROMORPHONE HYDROCHLORIDE 1 MILLIGRAM(S): 2 INJECTION INTRAMUSCULAR; INTRAVENOUS; SUBCUTANEOUS at 00:34

## 2017-06-04 RX ADMIN — HYDROMORPHONE HYDROCHLORIDE 1 MILLIGRAM(S): 2 INJECTION INTRAMUSCULAR; INTRAVENOUS; SUBCUTANEOUS at 00:24

## 2017-06-04 RX ADMIN — PANTOPRAZOLE SODIUM 40 MILLIGRAM(S): 20 TABLET, DELAYED RELEASE ORAL at 00:20

## 2017-06-04 RX ADMIN — HYDROMORPHONE HYDROCHLORIDE 1 MILLIGRAM(S): 2 INJECTION INTRAMUSCULAR; INTRAVENOUS; SUBCUTANEOUS at 06:22

## 2017-06-04 RX ADMIN — HYDROMORPHONE HYDROCHLORIDE 0.5 MILLIGRAM(S): 2 INJECTION INTRAMUSCULAR; INTRAVENOUS; SUBCUTANEOUS at 21:15

## 2017-06-04 RX ADMIN — HYDROMORPHONE HYDROCHLORIDE 0.5 MILLIGRAM(S): 2 INJECTION INTRAMUSCULAR; INTRAVENOUS; SUBCUTANEOUS at 13:20

## 2017-06-04 RX ADMIN — Medication 40 MILLIEQUIVALENT(S): at 22:04

## 2017-06-04 RX ADMIN — Medication 1 MILLIGRAM(S): at 02:07

## 2017-06-04 RX ADMIN — ONDANSETRON 4 MILLIGRAM(S): 8 TABLET, FILM COATED ORAL at 18:13

## 2017-06-04 RX ADMIN — PANTOPRAZOLE SODIUM 40 MILLIGRAM(S): 20 TABLET, DELAYED RELEASE ORAL at 11:21

## 2017-06-04 RX ADMIN — HYDROMORPHONE HYDROCHLORIDE 0.5 MILLIGRAM(S): 2 INJECTION INTRAMUSCULAR; INTRAVENOUS; SUBCUTANEOUS at 18:27

## 2017-06-04 RX ADMIN — SODIUM CHLORIDE 150 MILLILITER(S): 9 INJECTION INTRAMUSCULAR; INTRAVENOUS; SUBCUTANEOUS at 17:40

## 2017-06-04 RX ADMIN — PANTOPRAZOLE SODIUM 40 MILLIGRAM(S): 20 TABLET, DELAYED RELEASE ORAL at 23:29

## 2017-06-04 RX ADMIN — ENOXAPARIN SODIUM 40 MILLIGRAM(S): 100 INJECTION SUBCUTANEOUS at 11:22

## 2017-06-04 RX ADMIN — Medication 100 MILLIGRAM(S): at 14:39

## 2017-06-04 RX ADMIN — HYDROMORPHONE HYDROCHLORIDE 1 MILLIGRAM(S): 2 INJECTION INTRAMUSCULAR; INTRAVENOUS; SUBCUTANEOUS at 10:47

## 2017-06-04 RX ADMIN — HYDROMORPHONE HYDROCHLORIDE 1 MILLIGRAM(S): 2 INJECTION INTRAMUSCULAR; INTRAVENOUS; SUBCUTANEOUS at 16:34

## 2017-06-04 RX ADMIN — HYDROMORPHONE HYDROCHLORIDE 0.5 MILLIGRAM(S): 2 INJECTION INTRAMUSCULAR; INTRAVENOUS; SUBCUTANEOUS at 08:02

## 2017-06-04 RX ADMIN — SODIUM CHLORIDE 120 MILLILITER(S): 9 INJECTION, SOLUTION INTRAVENOUS at 06:17

## 2017-06-04 RX ADMIN — Medication 100 MILLIGRAM(S): at 11:21

## 2017-06-04 RX ADMIN — HYDROMORPHONE HYDROCHLORIDE 0.5 MILLIGRAM(S): 2 INJECTION INTRAMUSCULAR; INTRAVENOUS; SUBCUTANEOUS at 07:47

## 2017-06-04 RX ADMIN — Medication 100 MILLIGRAM(S): at 22:04

## 2017-06-04 RX ADMIN — HYDROMORPHONE HYDROCHLORIDE 1 MILLIGRAM(S): 2 INJECTION INTRAMUSCULAR; INTRAVENOUS; SUBCUTANEOUS at 23:29

## 2017-06-04 RX ADMIN — HYDROMORPHONE HYDROCHLORIDE 1 MILLIGRAM(S): 2 INJECTION INTRAMUSCULAR; INTRAVENOUS; SUBCUTANEOUS at 16:09

## 2017-06-04 RX ADMIN — HYDROMORPHONE HYDROCHLORIDE 1 MILLIGRAM(S): 2 INJECTION INTRAMUSCULAR; INTRAVENOUS; SUBCUTANEOUS at 11:03

## 2017-06-04 RX ADMIN — SODIUM CHLORIDE 2000 MILLILITER(S): 9 INJECTION INTRAMUSCULAR; INTRAVENOUS; SUBCUTANEOUS at 04:21

## 2017-06-04 RX ADMIN — HYDROMORPHONE HYDROCHLORIDE 0.5 MILLIGRAM(S): 2 INJECTION INTRAMUSCULAR; INTRAVENOUS; SUBCUTANEOUS at 13:05

## 2017-06-04 RX ADMIN — SODIUM CHLORIDE 1000 MILLILITER(S): 9 INJECTION INTRAMUSCULAR; INTRAVENOUS; SUBCUTANEOUS at 21:00

## 2017-06-04 RX ADMIN — HYDROMORPHONE HYDROCHLORIDE 0.5 MILLIGRAM(S): 2 INJECTION INTRAMUSCULAR; INTRAVENOUS; SUBCUTANEOUS at 21:01

## 2017-06-04 RX ADMIN — SODIUM CHLORIDE 1000 MILLILITER(S): 9 INJECTION INTRAMUSCULAR; INTRAVENOUS; SUBCUTANEOUS at 22:00

## 2017-06-04 NOTE — CHART NOTE - NSCHARTNOTEFT_GEN_A_CORE
case discussed with hospitalist who advised 2L V bolus and increasing fluid rate to 200cc/hr because of rhabdomyolysis with a cpk which is trending up. Will monitor BUN/Cr and CPK

## 2017-06-04 NOTE — DISCHARGE NOTE ADULT - PROVIDER TOKENS
TOKEN:'[id number]',TOKEN:'[id number]',FREE:[LAST:[Corky],FIRST:[Roro],PHONE:[(   )    -],FAX:[(   )    -],ADDRESS:[[place] [address]  Phone: [phone]]] TOKEN:'[id number]:'[id number]',FREE:[LAST:[Corky],FIRST:[Roro],PHONE:[(   )    -],FAX:[(   )    -],ADDRESS:[[place] [address]  Phone: [phone]]],TOKEN:'[id number]'

## 2017-06-04 NOTE — H&P ADULT - HISTORY OF PRESENT ILLNESS
21 year old male smoker w/ PMH of gastritis & s/p cholecystectomy brought to ED by his mother for acute onset abdominal pain. Limited history was obtained as patient was a poor historian and constantly demanding more pain medication. Patient stated that his abdominal pain was epigastric, "squeezing", 10, constant, non-radiating, alleviated w/ dilaudid, no exacerbating factors, associated w/ 5-6 episodes of nausea & bilious vomiting. Patient admits to smoking marijuana and claims that the time he consumed alcohol was 2-3 weeks ago. Patient was seen in ED on 9/2016 for a similar episode but the workup was negative. 21 year old male smoker w/ PMH of gastritis & substance abuse s/p cholecystectomy brought to ED by his mother for acute onset abdominal pain. Limited history was obtained as patient was a poor historian and constantly demanding more pain medication. Patient stated that his abdominal pain was epigastric, "squeezing", 10, constant, non-radiating, alleviated w/ dilaudid, no exacerbating factors, associated w/ 5-6 episodes of nausea & bilious vomiting. Patient admits to smoking marijuana and claims that the time he consumed alcohol was 2-3 weeks ago. Patient was seen in ED on 9/2016 for a similar episode but the workup was negative. 21 year old male smoker w/ PMH of gastritis & substance abuse s/p cholecystectomy brought to ED by his mother for acute onset abdominal pain. Limited history was obtained as patient was a poor historian and constantly demanding more pain medication. Patient stated that his abdominal pain was epigastric, "squeezing", 10/10, constant, non-radiating, alleviated w/ dilaudid, no exacerbating factors, associated w/ 5-6 episodes of nausea & bilious vomiting. Patient admits to smoking marijuana and claims that the time he consumed alcohol was 2-3 weeks ago. Patient was seen in ED on 9/2016 for a similar episode but the workup was negative.     Patient is s/p zofran 4 mg IVP x2, reglan 10 mg IVP x1, pepcid 20 mg IVP x1, toradol 30 mg IVP x1, dilaudid 1 mg IVP x2,  ativan 1 mg IVP x2 & 1L NS in the ED

## 2017-06-04 NOTE — PROGRESS NOTE ADULT - ATTENDING COMMENTS
Note addended where needed. Patient counseled on abstinence from drug use. Patient was on 1:1 observation bc he was under the influence in the ER but currently is pleasant and 1:1 was discontinued.

## 2017-06-04 NOTE — PROGRESS NOTE ADULT - SUBJECTIVE AND OBJECTIVE BOX
Patient seen and examined at bedside. Denies fever/chills. Appears comfortable in bed. Ortho consulted for right knee erythema.    Vital Signs Last 24 Hrs  T(C): 37.3, Max: 37.6 (06-03 @ 23:25)  T(F): 99.1, Max: 99.6 (06-03 @ 23:25)  HR: 75 (75 - 102)  BP: 110/53 (110/53 - 146/93)  BP(mean): --  RR: 18 (16 - 18)  SpO2: 97% (97% - 99%)    RLE: + anterior knee erythema, does not extend past previously demarcated lines. No fluctuance. No open wounds. No vesicles. No effusion. + FROM knee with no pain. Sensation in tact to light touch. + strong dorsi/plantarflexion. DP 2+. Compartments soft. Calf soft NT B/L.                          12.8   17.9  )-----------( 223      ( 04 Jun 2017 09:38 )             37.0   A/P: 21 y.o M with right knee cellulitis  - WBAT  - no indication for immediate orthopedic surgical intervention  - [facility] care primary team Patient seen and examined at bedside. Denies fever/chills. Appears comfortable in bed. Ortho consulted for right knee erythema.    Vital Signs Last 24 Hrs  T(C): 37.3, Max: 37.6 (06-03 @ 23:25)  T(F): 99.1, Max: 99.6 (06-03 @ 23:25)  HR: 75 (75 - 102)  BP: 110/53 (110/53 - 146/93)  BP(mean): --  RR: 18 (16 - 18)  SpO2: 97% (97% - 99%)    RLE: + anterior knee erythema, does not extend past previously demarcated lines. No fluctuance. No open wounds. No vesicles. No effusion. + FROM knee with no pain. Sensation in tact to light touch. + strong dorsi/plantarflexion. DP 2+. Compartments soft. Calf soft NT B/L.                          12.8   17.9  )-----------( 223      ( 04 Jun 2017 09:38 )             37.0   A/P: 21 y.o M with right knee cellulitis  - WBAT  - no indication for immediate orthopedic surgical intervention  - [facility] care primary team  I personally examined this patient on today's date with very minimal cellulitis about the right anterior kneecap I think may be some very mild prepatellar bursitis and cellulitis ice antibiotics as per primary team no definitive orthopedic recommendations at this point in time followup in the reconsult on as needed basis

## 2017-06-04 NOTE — H&P ADULT - ASSESSMENT
21 year old male smoker w/ PMH of gastritis & s/p cholecystectomy brought to ED by his mother for acute onset abdominal pain admitted for intractable abdominal pain. Differentials including gastritis, mesenteric ischemia, aortic dissection & malingering 21 year old male smoker w/ PMH of gastritis & substance abuse s/p cholecystectomy brought to ED by his mother for acute onset abdominal pain admitted for intractable abdominal pain. Differentials including gastritis, pancreatitis, mesenteric ischemia, aortic dissection & malingering. Drug screen positive for THC. CXR & Abdominal CT were negative 21 year old male smoker w/ PMH of gastritis & substance abuse s/p cholecystectomy brought to ED by his mother for acute onset abdominal pain admitted for intractable abdominal pain. Differentials including gastritis, pancreatitis, mesenteric ischemia, aortic dissection & malingering. Drug screen positive for THC. CXR & Abdominal CT were both negative.  s/p zofran 4 mg IVP x2, reglan 10 mg IVP x1 & pepcid 20 mg IVP x1 21 year old male smoker w/ PMH of gastritis & substance abuse s/p cholecystectomy brought to ED by his mother for acute onset abdominal pain admitted for intractable abdominal pain. Differentials including gastritis, pancreatitis, mesenteric ischemia, aortic dissection & malingering. Drug screen positive for THC. CXR & Abdominal CT were both negative. Patient is s/p zofran 4 mg IVP x2, reglan 10 mg IVP x1, pepcid 20 mg IVP x1, toradol 30 mg IVP x1, dilaudid 1 mg IVP x2 & ativan 1 mg IVP x2 in the ED 21 year old male smoker w/ PMH of gastritis & substance abuse s/p cholecystectomy brought to ED by his mother for acute onset abdominal pain admitted for intractable abdominal pain. Differentials including gastritis, pancreatitis, mesenteric ischemia, aortic dissection & malingering. Drug screen positive for THC. CXR & Abdominal CT were both negative. Patient is s/p zofran 4 mg IVP x2, reglan 10 mg IVP x1, pepcid 20 mg IVP x1, toradol 30 mg IVP x1, dilaudid 1 mg IVP x2,  ativan 1 mg IVP x2 & 1L NS in the ED 21 year old male smoker w/ PMH of gastritis & substance abuse s/p cholecystectomy brought to ED by his mother for acute onset abdominal pain admitted for intractable abdominal pain. Differentials include hyperemesis cannabinoid syndrome, pancreatitis, mesenteric ischemia, aortic dissection & malingering. Drug screen positive for THC. CXR & Abdominal CT were both negative. 21 year old male smoker w/ PMH of gastritis & substance abuse s/p cholecystectomy brought to ED by his mother for acute onset abdominal pain admitted for intractable abdominal pain. Differentials include hyperemesis cannabinoid syndrome vs mesenteric ischemia vs malingering. Drug screen positive for THC. CXR & Abdominal CT were both negative. X-ray of right knee pending. 21 year old male smoker w/ PMH of gastritis & substance abuse s/p cholecystectomy brought to ED by his mother for acute onset abdominal pain admitted for intractable abdominal pain found to be septic and was started on IV antibiotics & IV fluids. Blood & urine cultures pending. Drug screen positive for THC. CXR & Abdominal CT were both negative. X-ray of right knee pending. 21 year old male smoker w/ PMH of gastritis & substance abuse s/p cholecystectomy brought to ED by his mother for acute onset abdominal pain admitted for intractable abdominal pain found to be septic and was started on IV cefazolin (antibiotics day 1) & IV fluids. Blood & urine cultures pending. Drug screen positive for THC. CXR & Abdominal CT were both negative. X-ray of right knee pending. GI consult & Psych consult called

## 2017-06-04 NOTE — DISCHARGE NOTE ADULT - OTHER SIGNIFICANT FINDINGS
Testicular U/S IMPRESSION:           1.   Right hemiscrotum:  Scattered microlithiasis otherwise   Unremarkable.        2.   Left hemiscrotum: Scattered microlithiasis otherwise   Unremarkable.        CT Head w/o contrast:    No acute intracranial hemorrhage, mass effect, or evidence of acute territorial infarct.   If clinical symptoms persist, recommend follow-up imaging with MRI brain if there are no contraindications.

## 2017-06-04 NOTE — H&P ADULT - PROBLEM SELECTOR PLAN 3
unknown etiology  will consider starting flaygl if patient spikes a fever cefazolin 1g Q8H  IV fluids 30ml/kg   blood & urine cultures, venous lactate cefazolin 1g Q8H  IV fluids 30ml/kg   blood & urine cultures, venous lactate  Orthopedic PA consulted for possible septic joint cefazolin 1g Q8H (antibiotics day 1)  IV fluids 30ml/kg   blood & urine cultures, venous lactate  Orthopedic PA consulted for possible septic joint

## 2017-06-04 NOTE — PROGRESS NOTE ADULT - ASSESSMENT
21 year old male smoker w/ PMH of gastritis & substance abuse s/p cholecystectomy brought to ED by his mother for acute onset abdominal pain admitted for intractable abdominal pain found to be septic and was started on IV cefazolin (antibiotics day 1) & IV fluids. Blood & urine cultures pending. Drug screen positive for THC. CXR & Abdominal CT were both negative. X-ray of right knee pending. 21 year old male smoker w/ PMH of gastritis & substance abuse (active marijuana use) s/p cholecystectomy, prior multiple hospitalizations for abd pain/N/V and completed GI work up with negative colonoscopy 1 yr ago currently brought to ED  for acute onset of abdominal pain and admitted for intractable abdominal pain/N/V. Patient with leukocytosis, elevated lactate and starvation ketosis on labs which is likely secondary to intractable vomiting. Also noted to have rhabdomyolysis

## 2017-06-04 NOTE — DISCHARGE NOTE ADULT - SECONDARY DIAGNOSIS.
Non-traumatic rhabdomyolysis Other acute gastritis, presence of bleeding unspecified Epididymitis, right Rash and nonspecific skin eruption Marijuana abuse Testicular microlithiasis

## 2017-06-04 NOTE — H&P ADULT - PROBLEM SELECTOR PLAN 1
Admit to resident-medicine under Dr. Tressa HAYSO Admit to resident-medicine under Dr. Bustillos  NPO  IVF NS @ 100 ml/hr  dilaudid 1 g IVP Q3H PRN severe pain  dilaudid 0.5g IVP Q3H PRN moderate pain  CBC, CMP, PT/INR, Mg, Phos, Lipase, CK   GI consult   Psych consult Admit to resident-medicine under Dr. Bustillos  NPO  IVF NS @ 100 ml/hr  dilaudid 1 g IVP Q3H PRN severe pain  dilaudid 0.5g IVP Q3H PRN moderate pain  CBC, CMP, PT/INR, Mg, Phos, Lipase, CK, Drug Screen, HIV, Hepatitis panel    GI consult   Psych consult Admit to resident-medicine under Dr. Bustillos  clear liquid diet  IVF NS @ 100 ml/hr  dilaudid 1 g IVP Q3H PRN severe pain  dilaudid 0.5g IVP Q3H PRN moderate pain  CBC, CMP, PT/INR, Mg, Phos, Lipase, CK, Drug Screen, HIV, Hepatitis panel    GI consult & Psych consult called

## 2017-06-04 NOTE — ED ADULT NURSE REASSESSMENT NOTE - NS ED NURSE REASSESS COMMENT FT1
pt now sitting calm in bed. given po contrast.
patient taken to ct
pr rec'd at this itm ept alert and coopertive at this time . v/s taken and charted. patient sleeping at intervals. ptn vomitting to rec'ing patient or yeloowish clear fluids. but not at present time. will moniro and chart changes
pt sleeping in bed. breathing even and unlabored. awaiting reeval by provider. will continue to monitor.
spoke with Dr. Russell about thaimne order. unable to give do to route. Dr. Russell states weill correct.

## 2017-06-04 NOTE — DISCHARGE NOTE ADULT - MEDICATION SUMMARY - MEDICATIONS TO TAKE
I will START or STAY ON the medications listed below when I get home from the hospital:  None I will START or STAY ON the medications listed below when I get home from the hospital:    oxycodone-acetaminophen 5mg-325mg oral tablet  -- 1 tab(s) by mouth every 6 hours MDD:3 pills  -- Caution federal law prohibits the transfer of this drug to any person other  than the person for whom it was prescribed.  May cause drowsiness.  Alcohol may intensify this effect.  Use care when operating dangerous machinery.  This prescription cannot be refilled.  This product contains acetaminophen.  Do not use  with any other product containing acetaminophen to prevent possible liver damage.  Using more of this medication than prescribed may cause serious breathing problems.    -- Indication: For Abdominal pain    Carafate 1 g oral tablet  -- 1 tab(s) by mouth 3 times a day (before meals)  -- Do not take dairy products, antacids, or iron preparations within one hour of this medication.  It is very important that you take or use this exactly as directed.  Do not skip doses or discontinue unless directed by your doctor.  Take medication on an empty stomach 1 hour before or 2 to 3 hours after a meal unless otherwise directed by your doctor.    -- Indication: For Gastritis    Florastor 250 mg oral capsule  -- 1 cap(s) by mouth 2 times a day  -- Indication: For GI prophylaxis    pantoprazole 40 mg oral delayed release tablet  -- 1 tab(s) by mouth 2 times a day  -- It is very important that you take or use this exactly as directed.  Do not skip doses or discontinue unless directed by your doctor.  Obtain medical advice before taking any non-prescription drugs as some may affect the action of this medication.  Swallow whole.  Do not crush.    -- Indication: For Gastritis     levoFLOXacin 500 mg oral tablet  -- 1 tab(s) by mouth every 24 hours  -- Indication: For Epididymitis, right

## 2017-06-04 NOTE — DISCHARGE NOTE ADULT - HOSPITAL COURSE
21 year old male smoker w/PMH of gastritis & substance abuse (marijuana) s/p cholecystectomy brought to ED by his mother for acute onset abdominal pain. CT Abdomen Pelvis shows no acute intraabdominal pathology. Gastroenterology was consulted and labs for spot urine PBG, ALA and total porphoryin. UTox was positive only for THC. N/V and abdominal pain was treated with IV zofran, reglan, pepcid, toradol, dilaudid and Kefzol empirically.     Pt also has erythemic, non blanching, non pruritic rash on LE B/L ranging in size up to 4cm (on R knee). R knee xray negative for fracture, Orthopedics consulted and for evaluation for possible septic arthritis and determined that it was not septic arthritis and was probably cellulitis. Dermatology consult pending. 21 year old male smoker w/ PMH of Gastritis & Substance abuse (active marijuana use) s/p cholecystectomy, prior multiple hospitalizations for abd pain/N/V (over last 3 years)and completed GI work up with negative colonoscopy 1 yr ago currently brought to ED  for acute onset of abdominal pain and admitted for intractable abdominal pain/N/V. Patient with leukocytosis, elevated lactate and starvation ketosis on labs which is likely secondary to intractable vomiting. Also noted to have rhabdomyolysis.    CT Abdomen Pelvis shows no acute intraabdominal pathology. Gastroenterology was consulted and labs for spot urine PBG, ALA and total porphoryin. UTox was positive only for THC.  Patient is s/p EGD on 6/6/2017, biopsy specimens collected, testing to r/o Guero's Esophagus, H Pylori Monoclonal Antibody;       Urology consult [facility] called for scrotal pain ([id number]), b/l testicular microlithiasis on US;  Diagnosed patient as having right sided epidydimitis, he has pending labs for Gonorrhea, chlamydia, started treatment with Ceftriaxone IM and Levofloxacin PO.  Continue     N/V and abdominal pain was treated with IV zofran, reglan, pepcid, toradol, dilaudid and Kefzol empirically.     Pt also has erythemic, non blanching, non pruritic rash on LE B/L ranging in size up to 4cm (on R knee). R knee xray negative for fracture, Orthopedics consulted and for evaluation for possible septic arthritis and determined that it was not septic arthritis and was probably cellulitis. Dermatology consult pending. 21 year old male smoker w/ PMH of Gastritis & Substance abuse (active marijuana use) s/p cholecystectomy, prior multiple hospitalizations for abd pain/N/V (over last 3 years)and completed GI work up with negative colonoscopy 1 yr ago currently brought to ED  for acute onset of abdominal pain and admitted for intractable abdominal pain/N/V. Patient with leukocytosis, elevated lactate and starvation ketosis on labs which is likely secondary to intractable vomiting. Also noted to have rhabdomyolysis.    CT Abdomen Pelvis shows no acute intraabdominal pathology. Gastroenterology was consulted and labs for spot urine PBG, ALA and total porphoryin. UTox was positive only for THC.  Patient is s/p EGD on 6/6/2017, biopsy specimens collected, testing to r/o Guero's Esophagus, H Pylori Monoclonal Antibody;       Urology consult [facility] called for scrotal pain ([id number]), b/l testicular microlithiasis on US;  Diagnosed patient as having right sided epidydimitis, he has pending labs for Gonorrhea, chlamydia, started treatment with Ceftriaxone IM and Levofloxacin PO.  Continue     N/V and abdominal pain was treated with IV zofran, reglan, pepcid, toradol, dilaudid and Kefzol empirically.     Pt also has erythemic, non blanching, non pruritic rash on LE B/L ranging in size up to 4cm (on R knee). R knee xray negative for fracture, Orthopedics consulted and for evaluation for possible septic arthritis and determined that it was not septic arthritis and was probably cellulitis. Dermatology consult pending.    Patient is medically cleared for d/c;  continue taking Levofloxacin 500mg daily for 10 days;

## 2017-06-04 NOTE — H&P ADULT - NSHPPHYSICALEXAM_GEN_ALL_CORE
T(F): 97.2  HR: 82  BP: 136/80  RR: 16  SpO2: 99% on RA      Physical Exam:   GENERAL: well-groomed, well-developed, sleeping comfortably in bed then in severe distress due to intractable abdominal pain once awoken    HEAD:  Atraumatic, Normocephalic  EYES: EOMI, PERRLA, conjunctiva and sclera clear  ENMT: No tonsillar erythema, exudates, or enlargement; Moist mucous membranes  NECK: Supple, No JVD, Normal thyroid  NERVOUS SYSTEM: patient was non-cooperative with exam, CN  CHEST/LUNG: Clear to auscultation bilaterally; No rales, rhonchi, wheezing, or rubs  HEART: regular rate, irregular rhythm; No murmurs, rubs, or gallops  ABDOMEN: Soft, Nontender, Nondistended; Bowel sounds present, severe tenderness to palpation, guarding   EXTREMITIES:  2+ Peripheral Pulses, No clubbing, cyanosis, or edema  LYMPH: No lymphadenopathy noted  SKIN: multiple erythema skin lesions (right knee, posterior thigh, left medial thigh) T(F): 97.2  HR: 82  BP: 136/80  RR: 16  SpO2: 99% on RA    Physical Exam:   GENERAL: well-groomed, well-developed, sleeping comfortably in bed then in severe distress due to intractable abdominal pain once awoken    HEAD:  Atraumatic, Normocephalic  EYES: EOMI, PERRLA, conjunctiva and sclera clear  ENMT: No tonsillar erythema, exudates, or enlargement; Moist mucous membranes  NECK: Supple, No JVD, Normal thyroid  NERVOUS SYSTEM: AOx3, patient was non-cooperative with exam  CHEST/LUNG: Clear to auscultation bilaterally; No rales, rhonchi, wheezing, or rubs  HEART: regular rate, irregular rhythm; No murmurs, rubs, or gallops  ABDOMEN: Soft, Nondistended; Bowel sounds present, severe tenderness to palpation, guarding   EXTREMITIES:  2+ Peripheral Pulses, No clubbing, cyanosis, or edema, normal range of motion  LYMPH: No lymphadenopathy noted  SKIN: multiple raised erythematous skin lesions (right knee, posterior thigh, left medial thigh) T(F): 97.2  HR: 82  BP: 136/80  RR: 16  SpO2: 99% on RA    Physical Exam:   GENERAL: well-groomed, well-developed, sleeping comfortably in bed then in severe distress & agitated due to intractable abdominal pain once awoken    HEAD:  Atraumatic, Normocephalic  EYES: EOMI, PERRLA, conjunctiva and sclera clear  ENMT: No tonsillar erythema, exudates, or enlargement; Moist mucous membranes  NECK: Supple, No JVD, Normal thyroid  NERVOUS SYSTEM: AOx3, patient was non-cooperative with exam  CHEST/LUNG: Clear to auscultation bilaterally; No rales, rhonchi, wheezing, or rubs  HEART: regular rate, irregular rhythm; No murmurs, rubs, or gallops  ABDOMEN: Soft, Nondistended; Bowel sounds present, severe tenderness to palpation, guarding   EXTREMITIES:  2+ Peripheral Pulses, No clubbing, cyanosis, or edema, normal range of motion  LYMPH: No lymphadenopathy noted  SKIN: multiple raised erythematous skin lesions (right knee, posterior thigh, left medial thigh)

## 2017-06-04 NOTE — DISCHARGE NOTE ADULT - PLAN OF CARE
resolving f/u with GI group [facility] within 1 week; results should be back for pending GI biopsy tests, and tests to detect AIP;  avoid oily, salty, spicy foods;  increase hydration;  no strenuous activity before f/u with GI doctor; f/u with pcp within 1week;  increase hydration; continue taking protonix, and started taking carafate; avoid oily/salty/spicy foods; patient will take antibitoics for 11 days;  f/u with Urology group within the next 2 weeks;  will get f/u on patients testicular microlithiasis with them; resolving; f/u with dermatology within the next 2 weeks; maintenance; f/u with urology within 2 weeks; surveillance; maintenance of abstinence; advised to avoid marijuana and other illegal drugs; [facility] week; results should be back for pending work up and biopsy tests, and tests to detect Acute intermittent porphyria diagnosis;  avoid oily, salty, spicy foods;  increase hydration;  no strenuous activity before following with the gastroenterologist. Continue with hydration and follow up with your primary care physician in 3-5 days. continue taking protonix as prescribed, and start taking carafate; avoid oily/salty/spicy foods. Please follow up with gastroenterology for biopsy results. patient will take antibitoics for 10 days;  follow up with Urology group within the next 2 weeks;  will get follow up on patients testicular microlithiasis at that time. Please see phone number below to make an appointment. Follow up with dermatology within the next 2 weeks for lab work and further management. Resolved maintenance Improving Follow up with urology within 2 weeks. advised to avoid marijuana and other illegal drugs. abstinence;

## 2017-06-04 NOTE — DISCHARGE NOTE ADULT - CARE PLAN
Principal Discharge DX:	Intractable abdominal pain  Goal:	resolving  Instructions for follow-up, activity and diet:	f/u with GI group [facility] within 1 week; results should be back for pending GI biopsy tests, and tests to detect AIP;  avoid oily, salty, spicy foods;  increase hydration;  no strenuous activity before f/u with GI doctor;  Secondary Diagnosis:	Non-traumatic rhabdomyolysis  Goal:	resolving;  Instructions for follow-up, activity and diet:	f/u with pcp within 1week;  increase hydration;  Secondary Diagnosis:	Other acute gastritis, presence of bleeding unspecified  Goal:	maintenance;  Instructions for follow-up, activity and diet:	continue taking protonix, and started taking carafate; avoid oily/salty/spicy foods;  Secondary Diagnosis:	Epididymitis, right  Goal:	resolving;  Instructions for follow-up, activity and diet:	patient will take antibitoics for 11 days;  f/u with Urology group within the next 2 weeks;  will get f/u on patients testicular microlithiasis with them;  Secondary Diagnosis:	Rash and nonspecific skin eruption  Goal:	resolving;  Instructions for follow-up, activity and diet:	f/u with dermatology within the next 2 weeks;  Secondary Diagnosis:	Marijuana abuse  Goal:	maintenance of abstinence;  Instructions for follow-up, activity and diet:	advised to avoid marijuana and other illegal drugs;  Secondary Diagnosis:	Testicular microlithiasis  Goal:	surveillance;  Instructions for follow-up, activity and diet:	f/u with urology within 2 weeks; Principal Discharge DX:	Intractable abdominal pain  Goal:	resolving  Instructions for follow-up, activity and diet:	follow up with Gastroenterology group [facility] within 1 week; results should be back for pending work up and biopsy tests, and tests to detect Acute intermittent porphyria diagnosis;  avoid oily, salty, spicy foods;  increase hydration;  no strenuous activity before following with the gastroenterologist.  Secondary Diagnosis:	Non-traumatic rhabdomyolysis  Goal:	Resolved  Instructions for follow-up, activity and diet:	Continue with hydration and follow up with your primary care physician in 3-5 days.  Secondary Diagnosis:	Other acute gastritis, presence of bleeding unspecified  Goal:	maintenance  Instructions for follow-up, activity and diet:	continue taking protonix as prescribed, and start taking carafate; avoid oily/salty/spicy foods. Please follow up with gastroenterology for biopsy results.  Secondary Diagnosis:	Epididymitis, right  Goal:	Improving  Instructions for follow-up, activity and diet:	patient will take antibitoics for 10 days;  follow up with Urology group within the next 2 weeks;  will get follow up on patients testicular microlithiasis at that time. Please see phone number below to make an appointment.  Secondary Diagnosis:	Rash and nonspecific skin eruption  Goal:	resolving;  Instructions for follow-up, activity and diet:	Follow up with dermatology within the next 2 weeks for lab work and further management.  Secondary Diagnosis:	Marijuana abuse  Instructions for follow-up, activity and diet:	advised to avoid marijuana and other illegal drugs.  Secondary Diagnosis:	Testicular microlithiasis  Goal:	surveillance;  Instructions for follow-up, activity and diet:	Follow up with urology within 2 weeks. Principal Discharge DX:	Intractable abdominal pain  Goal:	resolving  Instructions for follow-up, activity and diet:	follow up with Gastroenterology group [facility] within 1 week; results should be back for pending work up and biopsy tests, and tests to detect Acute intermittent porphyria diagnosis;  avoid oily, salty, spicy foods;  increase hydration;  no strenuous activity before following with the gastroenterologist.  Secondary Diagnosis:	Non-traumatic rhabdomyolysis  Goal:	Resolved  Instructions for follow-up, activity and diet:	Continue with hydration and follow up with your primary care physician in 3-5 days.  Secondary Diagnosis:	Other acute gastritis, presence of bleeding unspecified  Goal:	maintenance  Instructions for follow-up, activity and diet:	continue taking protonix as prescribed, and start taking carafate; avoid oily/salty/spicy foods. Please follow up with gastroenterology for biopsy results.  Secondary Diagnosis:	Epididymitis, right  Goal:	Improving  Instructions for follow-up, activity and diet:	patient will take antibitoics for 10 days;  follow up with Urology group within the next 2 weeks;  will get follow up on patients testicular microlithiasis at that time. Please see phone number below to make an appointment.  Secondary Diagnosis:	Rash and nonspecific skin eruption  Goal:	resolving;  Instructions for follow-up, activity and diet:	Follow up with dermatology within the next 2 weeks for lab work and further management.  Secondary Diagnosis:	Marijuana abuse  Goal:	abstinence;  Instructions for follow-up, activity and diet:	advised to avoid marijuana and other illegal drugs.  Secondary Diagnosis:	Testicular microlithiasis  Goal:	surveillance;  Instructions for follow-up, activity and diet:	Follow up with urology within 2 weeks. Principal Discharge DX:	Intractable abdominal pain  Goal:	resolving  Instructions for follow-up, activity and diet:	follow up with Gastroenterology group [facility] within 1 week; results should be back for pending work up and biopsy tests, and tests to detect Acute intermittent porphyria diagnosis;  avoid oily, salty, spicy foods;  increase hydration;  no strenuous activity before following with the gastroenterologist.  Secondary Diagnosis:	Non-traumatic rhabdomyolysis  Goal:	Resolved  Instructions for follow-up, activity and diet:	Continue with hydration and follow up with your primary care physician in 3-5 days.  Secondary Diagnosis:	Other acute gastritis, presence of bleeding unspecified  Goal:	maintenance  Instructions for follow-up, activity and diet:	continue taking protonix as prescribed, and start taking carafate; avoid oily/salty/spicy foods. Please follow up with gastroenterology for biopsy results.  Secondary Diagnosis:	Epididymitis, right  Goal:	Improving  Instructions for follow-up, activity and diet:	patient will take antibitoics for 10 days;  follow up with Urology group within the next 2 weeks;  will get follow up on patients testicular microlithiasis at that time. Please see phone number below to make an appointment.  Secondary Diagnosis:	Rash and nonspecific skin eruption  Goal:	resolving;  Instructions for follow-up, activity and diet:	Follow up with dermatology within the next 2 weeks for lab work and further management.  Secondary Diagnosis:	Marijuana abuse  Goal:	abstinence;  Instructions for follow-up, activity and diet:	advised to avoid marijuana and other illegal drugs.  Secondary Diagnosis:	Testicular microlithiasis  Goal:	surveillance;  Instructions for follow-up, activity and diet:	Follow up with urology within 2 weeks. Principal Discharge DX:	Intractable abdominal pain  Goal:	resolving  Instructions for follow-up, activity and diet:	follow up with Gastroenterology group [facility] within 1 week; results should be back for pending work up and biopsy tests, and tests to detect Acute intermittent porphyria diagnosis;  avoid oily, salty, spicy foods;  increase hydration;  no strenuous activity before following with the gastroenterologist.  Secondary Diagnosis:	Non-traumatic rhabdomyolysis  Goal:	Resolved  Instructions for follow-up, activity and diet:	Continue with hydration and follow up with your primary care physician in 3-5 days.  Secondary Diagnosis:	Other acute gastritis, presence of bleeding unspecified  Goal:	maintenance  Instructions for follow-up, activity and diet:	continue taking protonix as prescribed, and start taking carafate; avoid oily/salty/spicy foods. Please follow up with gastroenterology for biopsy results.  Secondary Diagnosis:	Epididymitis, right  Goal:	Improving  Instructions for follow-up, activity and diet:	patient will take antibitoics for 10 days;  follow up with Urology group within the next 2 weeks;  will get follow up on patients testicular microlithiasis at that time. Please see phone number below to make an appointment.  Secondary Diagnosis:	Rash and nonspecific skin eruption  Goal:	resolving;  Instructions for follow-up, activity and diet:	Follow up with dermatology within the next 2 weeks for lab work and further management.  Secondary Diagnosis:	Marijuana abuse  Goal:	abstinence;  Instructions for follow-up, activity and diet:	advised to avoid marijuana and other illegal drugs.  Secondary Diagnosis:	Testicular microlithiasis  Goal:	surveillance;  Instructions for follow-up, activity and diet:	Follow up with urology within 2 weeks. Principal Discharge DX:	Intractable abdominal pain  Goal:	resolving  Instructions for follow-up, activity and diet:	follow up with Gastroenterology group [facility] within 1 week; results should be back for pending work up and biopsy tests, and tests to detect Acute intermittent porphyria diagnosis;  avoid oily, salty, spicy foods;  increase hydration;  no strenuous activity before following with the gastroenterologist.  Secondary Diagnosis:	Non-traumatic rhabdomyolysis  Goal:	Resolved  Instructions for follow-up, activity and diet:	Continue with hydration and follow up with your primary care physician in 3-5 days.  Secondary Diagnosis:	Other acute gastritis, presence of bleeding unspecified  Goal:	maintenance  Instructions for follow-up, activity and diet:	continue taking protonix as prescribed, and start taking carafate; avoid oily/salty/spicy foods. Please follow up with gastroenterology for biopsy results.  Secondary Diagnosis:	Epididymitis, right  Goal:	Improving  Instructions for follow-up, activity and diet:	patient will take antibitoics for 10 days;  follow up with Urology group within the next 2 weeks;  will get follow up on patients testicular microlithiasis at that time. Please see phone number below to make an appointment.  Secondary Diagnosis:	Rash and nonspecific skin eruption  Goal:	resolving;  Instructions for follow-up, activity and diet:	Follow up with dermatology within the next 2 weeks for lab work and further management.  Secondary Diagnosis:	Marijuana abuse  Goal:	abstinence;  Instructions for follow-up, activity and diet:	advised to avoid marijuana and other illegal drugs.  Secondary Diagnosis:	Testicular microlithiasis  Goal:	surveillance;  Instructions for follow-up, activity and diet:	Follow up with urology within 2 weeks.

## 2017-06-04 NOTE — DISCHARGE NOTE ADULT - PATIENT PORTAL LINK FT
“You can access the Follow[facility] Patient Portal, offered by [facility], by registering with the following website: [email or web address]”

## 2017-06-04 NOTE — CONSULT NOTE ADULT - SUBJECTIVE AND OBJECTIVE BOX
Pt Name: JOSE REYESBENITEZ    MRN: [id number]    Patient is a 21y Male presenting to the emergency department with a chief complaint of Abdominal pain & vomiting (04 Jun 2017 00:35)  Pt is smoker w/ PMH of gastritis & substance abuse s/p cholecystectomy brought to ED by his mother for acute onset abdominal pain.  Orthopedic surgery consulted for right knee erythema/ rule out septic arthritis  Pt is poor historian, c/o severe abdominal pain, denies any pain to right knee with ROM or palpation    HEALTH ISSUES - PROBLEM Dx:  Need for prophylactic measure: Need for prophylactic measure  Substance abuse: Substance abuse  Leukocytosis: Leukocytosis  Gastritis: Gastritis  Intractable abdominal pain: Intractable abdominal pain    REVIEW OF SYSTEMS    Musculoskeletal: + erythema over right patella    ROS is otherwise negative.    PAST MEDICAL & SURGICAL HISTORY:  Gastritis  No pertinent past medical history  S/P cholecystectomy  No significant past surgical history    Allergies: No Known Allergies    Medications: pantoprazole  Injectable 40milliGRAM(s) IV Push every 12 hours  HYDROmorphone  Injectable 1milliGRAM(s) IV Push every 3 hours PRN  HYDROmorphone  Injectable 0.5milliGRAM(s) IV Push every 3 hours PRN  enoxaparin Injectable 40milliGRAM(s) SubCutaneous daily  sodium chloride 0.9% Bolus 1000milliLiter(s) IV Bolus once  ondansetron Injectable 4milliGRAM(s) IV Push every 6 hours PRN  thiamine Injectable 100milliGRAM(s) IV Push once  ceFAZolin   IVPB  IV Intermittent   sodium chloride 0.9% Bolus 1000milliLiter(s) IV Bolus once  ceFAZolin   IVPB 1000milliGRAM(s) IV Intermittent once  ceFAZolin   IVPB 1000milliGRAM(s) IV Intermittent every 8 hours  sodium chloride 0.45%. 1000milliLiter(s) IV Continuous <Continuous>  diazepam  Injectable 5milliGRAM(s) IV Push once PRN      FAMILY HISTORY:  No pertinent family history in first degree relatives  : non-contributory    Social History: admits to smoking tobacco/ marijuana and alcohol use    Ambulation: Walking independently                           14.9   25.2  )-----------( 230      ( 03 Jun 2017 20:29 )             42.4     06-03    139  |  98  |  14.0  ----------------------------<  131<H>  4.4   |  20.0<L>  |  0.59    Ca    10.2      03 Jun 2017 16:00  Phos  3.5     06-04  Mg     1.9     06-04    TPro  8.4  /  Alb  5.2  /  TBili  0.7  /  DBili  x   /  AST  52<H>  /  ALT  14  /  AlkPhos  134<H>  06-03    PHYSICAL EXAM:    Vital Signs Last 24 Hrs  T(C): 37.1, Max: 37.6 (06-03 @ 23:25)  T(F): 98.8, Max: 99.6 (06-03 @ 23:25)  HR: 95 (82 - 95)  BP: 139/75 (132/74 - 139/75)  BP(mean): --  RR: 18 (16 - 18)  SpO2: 98% (98% - 99%)  Daily Height in cm: 167.64 (03 Jun 2017 14:57)    Daily     Appearance: Alert, responsive, in no acute distress.    Neurological: Sensation is grossly intact to light touch. 5/5 motor function of all extremities. No focal deficits or weaknesses found.    Skin: multiple erythematous patches over bilateral thighs and right patella. Skin is clean, dry and intact. No bleeding. No abrasions. No ulcerations.    Vascular: 2+ distal pulses. Cap refill < 2 sec. No signs of venous insufficiency or stasis. No extremity ulcerations. No cyanosis.    Musculoskeletal:         Right Lower Extremity: skin intact, + erythema over patella, no joint effusion, NTTP knee, full active ROM knee without pain, calf soft NT, + active plantar/ dorsiflexion, +2 DP pulse, sensation to light touch intact    Imaging Studies:    right knee xrays negative for acute fracture, dislocation, or any sign of joint effusion    A/P:  Patient is a 21y old  Male who presents with a chief complaint of Abdominal pain & vomiting (04 Jun 2017 00:35)   found to have right knee cellulitis     PLAN:   * No acute orthopedic intervention required  * Continue care per primary team  * WBAT RLE   * follow up with Ortho as needed

## 2017-06-04 NOTE — PROGRESS NOTE ADULT - PROBLEM SELECTOR PLAN 6
Lovenox 40 mg SQ daily -Drug screen came back positive for THC -pt was under the influence on admission   -patient counseled on abstinence from drug use   -Social work consulted

## 2017-06-04 NOTE — DISCHARGE NOTE ADULT - CARE PROVIDER_API CALL
Mingo Sousa), Urology  [address], [place] [id number]  Phone: [phone]  Fax: [phone]    Flor Arnold), Dermatology  [address] [place] [address]  Phone: [phone]  Fax: [phone]    Roro Fried  [address], [place] [address]  Phone: [phone]  Phone: (   )    -  Fax: (   )    - Mingo Sousa), Urology  [address] [place], [place] [id number]  Phone: [phone]  Fax: [phone]    Flor Arnold), Dermatology  [address] [place] [address]  Phone: [phone]  Fax: [phone]    Roro Fried  [address], [place] [address]  Phone: [phone]  Phone: (   )    -  Fax: (   )    -    Carley Quevedo (DO), Gastroenterology; Internal Medicine  [address] [place] [address]  Phone: [phone]  Fax: [phone]

## 2017-06-04 NOTE — DISCHARGE NOTE ADULT - CARE PROVIDERS DIRECT ADDRESSES
,DirectAddress_Unknown,DirectAddress_Unknown,DirectAddress_Unknown ,DirectAddress_Unknown,DirectAddress_Unknown,DirectAddress_Unknown,[email or web address]

## 2017-06-04 NOTE — CONSULT NOTE ADULT - SUBJECTIVE AND OBJECTIVE BOX
Patient is a 21y old  Male who presents with a chief complaint of Abdominal pain & vomiting (04 Jun 2017 00:35)      HPI:  21 year old male smoker w/ PMH ofsubstance abuse s/p cholecystectomy brought to ED by his mother for acute onset abdominal pain.       Patient states that started yesterday around 11 pm. Pain is severe, constant burning pain. Pain is 8/10 intensity.  Worse with eating. Has "dry heaves". no hematemesis. Patient has long standing complaints of upper abdominal pain. Has been admitted multiple times for this pain in 2015, then in february of 2016. Had EGD about 1.5 years ago. I reviewed the results. EGD was normal. Pathology negative for HP. Was supposed to be taking PPI and carafate, but pt is no meds at home. States Prilosec made pain worse.      He states last ETOH was 2 weeks ago. Takes marijuana qd.     REVIEW OF SYSTEMS:  Constitutional: No fever, weight loss or fatigue  ENMT:  No difficulty hearing, tinnitus, vertigo; No sinus or throat pain  Respiratory: No cough, wheezing, chills or hemoptysis  Cardiovascular: No chest pain, palpitations, dizziness or leg swelling  Gastrointestinal: +epigastric pain. + nausea, +vomiting no hematemesis; No diarrhea or constipation. No melena or hematochezia.  Skin: No itching, burning, rashes or lesions   Musculoskeletal: No joint pain or swelling; No muscle, back or extremity pain    PAST MEDICAL & SURGICAL HISTORY:    No pertinent past medical history  S/P cholecystectomy        FAMILY HISTORY:  mother DM      SOCIAL HISTORY:  Smoking Status: [ ] Current, [ ] Former, [ ] Never  Pack Years:  [  ] EtOH  [  ] IVDA  +marijuana    MEDICATIONS:  MEDICATIONS  (STANDING):  pantoprazole  Injectable 40milliGRAM(s) IV Push every 12 hours  enoxaparin Injectable 40milliGRAM(s) SubCutaneous daily  ceFAZolin   IVPB  IV Intermittent   ceFAZolin   IVPB 1000milliGRAM(s) IV Intermittent every 8 hours  sodium chloride 0.45%. 1000milliLiter(s) IV Continuous <Continuous>  thiamine 100milliGRAM(s) Oral daily    MEDICATIONS  (PRN):  HYDROmorphone  Injectable 1milliGRAM(s) IV Push every 3 hours PRN Severe Pain (7 - 10)  HYDROmorphone  Injectable 0.5milliGRAM(s) IV Push every 3 hours PRN Moderate Pain (4 - 6)  ondansetron Injectable 4milliGRAM(s) IV Push every 6 hours PRN Nausea and/or Vomiting      Allergies    No Known Allergies    Intolerances        Vital Signs Last 24 Hrs  T(C): 36.7, Max: 37.6 (06-03 @ 23:25)  T(F): 98, Max: 99.6 (06-03 @ 23:25)  HR: 102 (82 - 102)  BP: 146/93 (132/74 - 146/93)  BP(mean): --  RR: 18 (16 - 18)  SpO2: 98% (98% - 99%)    I & Os for current day (as of 06-04 @ 08:36)  =============================================  IN: 120 ml / OUT: 0 ml / NET: 120 ml        PHYSICAL EXAM:    General: thin- lethargic as he recieved pain meds  HEENT: MMM, conjunctiva and sclera clear  Lungs- CTA  Gastrointestinal: Soft, + epigastric tenderness; Normal bowel sounds; No rebound or guarding  Extremities: Normal range of motion, No clubbing, cyanosis or edema  Neurological: Alert and oriented x3  Skin: Warm and dry.+rash- erthematous areas in the lower extermities, right knee  rectal- no masses, brown stool      LABS:                        14.9   25.2  )-----------( 230      ( 03 Jun 2017 20:29 )             42.4     03 Jun 2017 16:00    139    |  98     |  14.0   ----------------------------<  131    4.4     |  20.0   |  0.59     Ca    10.2       03 Jun 2017 16:00  Phos  3.5       04 Jun 2017 00:58  Mg     1.9       04 Jun 2017 00:58    TPro  8.4    /  Alb  5.2    /  TBili  0.7    /  DBili  x      /  AST  52     /  ALT  14     /  AlkPhos  134    / Amylase x      /Lipase 10     03 Jun 2017 16:00          C-Reactive Protein, Serum: 4.50 mg/dL (06-04 @ 00:58), lactate 2.4      RADIOLOGY & ADDITIONAL STUDIES: Patient is a 21y old  Male who presents with a chief complaint of Abdominal pain & vomiting (04 Jun 2017 00:35)      HPI:  21 year old male smoker w/ PMH ofsubstance abuse s/p cholecystectomy brought to ED by his mother for acute onset abdominal pain.       Patient states that started yesterday around 11 pm. Pain is severe, constant burning pain. Pain is 8/10 intensity.  Worse with eating. Has "dry heaves". no hematemesis. Patient has long standing complaints of upper abdominal pain. Has been admitted multiple times for this pain in 2015, then in february of 2016. Had EGD about 1.5 years ago. I reviewed the results. EGD was normal. Pathology negative for HP. Was supposed to be taking PPI and carafate, but pt is no meds at home. States Prilosec made pain worse.      He states last ETOH was 2 weeks ago. Takes marijuana qd.     REVIEW OF SYSTEMS:  Constitutional: No fever, weight loss or fatigue  ENMT:  No difficulty hearing, tinnitus, vertigo; No sinus or throat pain  Respiratory: No cough, wheezing, chills or hemoptysis  Cardiovascular: No chest pain, palpitations, dizziness or leg swelling  Gastrointestinal: +epigastric pain. + nausea, +vomiting no hematemesis; No diarrhea or constipation. No melena or hematochezia.  Skin: No itching, burning, rashes or lesions   Musculoskeletal: No joint pain or swelling; No muscle, back or extremity pain    PAST MEDICAL & SURGICAL HISTORY:    No pertinent past medical history  S/P cholecystectomy        FAMILY HISTORY:  mother DM      SOCIAL HISTORY:  Smoking Status: [ ] Current, [ ] Former, [ ] Never  Pack Years:  [  ] EtOH  [  ] IVDA  +marijuana    MEDICATIONS:  MEDICATIONS  (STANDING):  pantoprazole  Injectable 40milliGRAM(s) IV Push every 12 hours  enoxaparin Injectable 40milliGRAM(s) SubCutaneous daily  ceFAZolin   IVPB  IV Intermittent   ceFAZolin   IVPB 1000milliGRAM(s) IV Intermittent every 8 hours  sodium chloride 0.45%. 1000milliLiter(s) IV Continuous <Continuous>  thiamine 100milliGRAM(s) Oral daily    MEDICATIONS  (PRN):  HYDROmorphone  Injectable 1milliGRAM(s) IV Push every 3 hours PRN Severe Pain (7 - 10)  HYDROmorphone  Injectable 0.5milliGRAM(s) IV Push every 3 hours PRN Moderate Pain (4 - 6)  ondansetron Injectable 4milliGRAM(s) IV Push every 6 hours PRN Nausea and/or Vomiting      Allergies    No Known Allergies    Intolerances        Vital Signs Last 24 Hrs  T(C): 36.7, Max: 37.6 (06-03 @ 23:25)  T(F): 98, Max: 99.6 (06-03 @ 23:25)  HR: 102 (82 - 102)  BP: 146/93 (132/74 - 146/93)  BP(mean): --  RR: 18 (16 - 18)  SpO2: 98% (98% - 99%)    I & Os for current day (as of 06-04 @ 08:36)  =============================================  IN: 120 ml / OUT: 0 ml / NET: 120 ml        PHYSICAL EXAM:    General: thin- lethargic as he recieved pain meds  HEENT: MMM, conjunctiva and sclera clear  Lungs- CTA  Gastrointestinal: Soft, + epigastric tenderness; Normal bowel sounds; No rebound or guarding  Extremities: Normal range of motion, No clubbing, cyanosis or edema  Neurological: Alert and oriented x3  Skin: Warm and dry.+rash- erthematous areas in the lower extermities, right knee  rectal- no masses, brown stool      LABS:                        14.9   25.2  )-----------( 230      ( 03 Jun 2017 20:29 )             42.4     03 Jun 2017 16:00    139    |  98     |  14.0   ----------------------------<  131    4.4     |  20.0   |  0.59     Ca    10.2       03 Jun 2017 16:00  Phos  3.5       04 Jun 2017 00:58  Mg     1.9       04 Jun 2017 00:58    TPro  8.4    /  Alb  5.2    /  TBili  0.7    /  DBili  x      /  AST  52     /  ALT  14     /  AlkPhos  134    / Amylase x      /Lipase 10     03 Jun 2017 16:00          C-Reactive Protein, Serum: 4.50 mg/dL (06-04 @ 00:58), lactate 2.4      RADIOLOGY & ADDITIONAL STUDIES:    IMPRESSION:    No acute intra-abdominal or pelvic pathology on CT.

## 2017-06-04 NOTE — PROGRESS NOTE ADULT - PROBLEM SELECTOR PLAN 3
cefazolin 1g Q8H (antibiotics day 1)  IV fluids 30ml/kg   f/u blood & urine cultures, repeat venous lactate  Orthopedic PA consulted for possible septic joint with concern for cellulitis, slight warmth and erythema of R knee, inner left thigh and small areas of b/l legs although elevated wbc/lactate could all be secondary to vomiting pt is empirically being tx for cellulitis but less likely and no evidence of septic joint. 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   -dermatology consulted   -ortho consult noted and appreciated  -will c/w cefazolin for now   -f/u Bcx

## 2017-06-04 NOTE — DISCHARGE NOTE ADULT - ADDITIONAL INSTRUCTIONS
F/U with GI Doctor within 1 week;   f/u with PCP and Urology within the next 2 weeks;    avoid spicy/oily/salty foods;    Return to the ED if You vomit blood, You have black or bloody bowel movements, You have worsening of your stomach or back pain. Follow up with gastroenterologist Doctor within 1 week;   f/u with PCP and Urology within the next 2 weeks;    avoid spicy/oily/salty foods;    Return to the ED if You vomit blood, You have black or bloody bowel movements, You have worsening of your stomach or back pain. Follow up with gastroenterologist Doctor within 1 week;   Follow up with Primary care physician and Urology within the next 2 weeks. Finish course of antibiotics for 10 days. Please follow up with dermatology for rash and further management.   Please see below to make an appointment.   Return to the ED if You vomit blood, You have black or bloody bowel movements, You have worsening of your stomach or back pain.

## 2017-06-04 NOTE — PROGRESS NOTE ADULT - PROBLEM SELECTOR PLAN 1
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   -today pt reports that pain remains unbearable, no episodes of vomiting but pt was dry heaving   -c/w clear liquid diet  -c/w IVF NS @ 150 ml/hr  -c/w dilaudid 1 g IVP Q3H PRN severe pain  -c/w dilaudid 0.5g IVP Q3H PRN moderate pain  -c/w hot showers for relief if secondary to marijuana hyperemesis   -prior colonoscopy one year ago negative and multiple hospitalizations for similar symptoms   -GI f/u noted and appreciated and agree with acute intermittent porphyria work up  -f/u urine porphobilinogen/urine ALA   -Patient counseled on abstinence from drug use and advised that may be contributing to his current GI problem.

## 2017-06-04 NOTE — PROGRESS NOTE ADULT - SUBJECTIVE AND OBJECTIVE BOX
INTERVAL HPI/OVERNIGHT EVENTS:  Patient is a 21y Male Admitted with chief complaint of abdominal pain & vomiting (04 Jun 2017 00:35)    Patient seen and examined at bedside, No acute overnight events. Patient is calmer and less agitated this morning. Patient stated that the abdominal pain and vomiting have improved. Denies any fever, chest pain, SOB, abdominal pain, diarrhea, constipation, calf pain.    ROS: denies fever, chills, chest pain, SOB, abdominal pain, diarrhea, calf pain.    Allergies    No Known Allergies    Intolerances    VS:   Vital Signs Last 24 Hrs  T(C): 36.7, Max: 37.6 (06-03 @ 23:25)  T(F): 98, Max: 99.6 (06-03 @ 23:25)  HR: 102 (82 - 102)  BP: 146/93 (132/74 - 146/93)  BP(mean): --  RR: 18 (16 - 18)  SpO2: 98% (98% - 99%)    Physical Exam:   GENERAL: well-groomed, well-developed, sleeping comfortably in bed then in severe distress & agitated due to intractable abdominal pain once awoken    HEAD:  Atraumatic, Normocephalic  EYES: EOMI, PERRLA, conjunctiva and sclera clear  ENMT: No tonsillar erythema, exudates, or enlargement; Moist mucous membranes  NECK: Supple, No JVD, Normal thyroid  NERVOUS SYSTEM: AOx3, patient was non-cooperative with exam  CHEST/LUNG: Clear to auscultation bilaterally; No rales, rhonchi, wheezing, or rubs  HEART: regular rate, irregular rhythm; No murmurs, rubs, or gallops  ABDOMEN: Soft, Nondistended; Bowel sounds present, severe tenderness to palpation, guarding   EXTREMITIES:  2+ Peripheral Pulses, No clubbing, cyanosis, or edema, normal range of motion  LYMPH: No lymphadenopathy noted  SKIN: multiple raised erythematous skin lesions (right knee, posterior thigh, left medial thigh)    I & Os for current day (as of 06-04 @ 06:56)  =============================================  IN: 120 ml / OUT: 0 ml / NET: 120 ml      Labs:                        14.9   25.2  )-----------( 230      ( 03 Jun 2017 20:29 )             42.4   06-03    139  |  98  |  14.0  ----------------------------<  131<H>  4.4   |  20.0<L>  |  0.59    Ca    10.2      03 Jun 2017 16:00  Phos  3.5     06-04  Mg     1.9     06-04    TPro  8.4  /  Alb  5.2  /  TBili  0.7  /  DBili  x   /  AST  52<H>  /  ALT  14  /  AlkPhos  134<H>  06-03    Venous lactate: 2.4        Medications:  MEDICATIONS  (STANDING):  pantoprazole  Injectable 40milliGRAM(s) IV Push every 12 hours  enoxaparin Injectable 40milliGRAM(s) SubCutaneous daily  ceFAZolin   IVPB  IV Intermittent   ceFAZolin   IVPB 1000milliGRAM(s) IV Intermittent every 8 hours  sodium chloride 0.45%. 1000milliLiter(s) IV Continuous <Continuous>  thiamine 100milliGRAM(s) Oral daily    MEDICATIONS  (PRN):  HYDROmorphone  Injectable 1milliGRAM(s) IV Push every 3 hours PRN Severe Pain (7 - 10)  HYDROmorphone  Injectable 0.5milliGRAM(s) IV Push every 3 hours PRN Moderate Pain (4 - 6)  ondansetron Injectable 4milliGRAM(s) IV Push every 6 hours PRN Nausea and/or Vomiting

## 2017-06-04 NOTE — H&P ADULT - PROBLEM SELECTOR PLAN 4
Social work consult Drug screen came back positive for THC  Social work consult protonix 40 mg IV push Q12H

## 2017-06-04 NOTE — PROGRESS NOTE ADULT - PROBLEM SELECTOR PLAN 2
venous lactate 2.4 & WBC of 28; patient is afebrile and hemodynamically stable  cefazolin 1g Q8H (antibiotics day 1)  IV fluids 30ml/kg   f/u blood & urine cultures, repeat venous lactate Initially when patient was admitted he was under the influence of marijuana and was aggressive/ shaking, unclear etiology   -c/w trending ck levels  -increased IVF to 150ml/hr

## 2017-06-04 NOTE — H&P ADULT - PROBLEM SELECTOR PLAN 2
40 mg IV push Q12H venous lactate 2.4 & WBC of 28; patient is afebrile and hemodynamically stable  cefazolin 1g Q8H  IV fluids 30ml/kg   blood & urine cultures, venous lactate venous lactate 2.4 & WBC of 28; patient is afebrile and hemodynamically stable  cefazolin 1g Q8H (antibiotics day 1)  IV fluids 30ml/kg   blood & urine cultures, venous lactate

## 2017-06-05 DIAGNOSIS — R10.10 UPPER ABDOMINAL PAIN, UNSPECIFIED: ICD-10-CM

## 2017-06-05 LAB
ALBUMIN SERPL ELPH-MCNC: 4.2 G/DL — SIGNIFICANT CHANGE UP (ref 3.3–5.2)
ALP SERPL-CCNC: 95 U/L — SIGNIFICANT CHANGE UP (ref 40–120)
ALT FLD-CCNC: 14 U/L — SIGNIFICANT CHANGE UP
ANION GAP SERPL CALC-SCNC: 9 MMOL/L — SIGNIFICANT CHANGE UP (ref 5–17)
AST SERPL-CCNC: 54 U/L — HIGH
BILIRUB SERPL-MCNC: 0.6 MG/DL — SIGNIFICANT CHANGE UP (ref 0.4–2)
BUN SERPL-MCNC: 7 MG/DL — LOW (ref 8–20)
CALCIUM SERPL-MCNC: 8.6 MG/DL — SIGNIFICANT CHANGE UP (ref 8.6–10.2)
CHLORIDE SERPL-SCNC: 102 MMOL/L — SIGNIFICANT CHANGE UP (ref 98–107)
CK MB CFR SERPL CALC: 13.2 NG/ML — HIGH (ref 0–6.7)
CK SERPL-CCNC: 1249 U/L — HIGH (ref 30–200)
CO2 SERPL-SCNC: 26 MMOL/L — SIGNIFICANT CHANGE UP (ref 22–29)
CREAT SERPL-MCNC: 0.54 MG/DL — SIGNIFICANT CHANGE UP (ref 0.5–1.3)
CULTURE RESULTS: NO GROWTH — SIGNIFICANT CHANGE UP
EOSINOPHIL # BLD AUTO: 0 K/UL — SIGNIFICANT CHANGE UP (ref 0–0.5)
EOSINOPHIL NFR BLD AUTO: 0.1 % — SIGNIFICANT CHANGE UP (ref 0–5)
GLUCOSE SERPL-MCNC: 97 MG/DL — SIGNIFICANT CHANGE UP (ref 70–115)
HCT VFR BLD CALC: 39.2 % — LOW (ref 42–52)
HGB BLD-MCNC: 13.4 G/DL — LOW (ref 14–18)
LYMPHOCYTES # BLD AUTO: 2.3 K/UL — SIGNIFICANT CHANGE UP (ref 1–4.8)
LYMPHOCYTES # BLD AUTO: 24 % — SIGNIFICANT CHANGE UP (ref 20–55)
MAGNESIUM SERPL-MCNC: 1.8 MG/DL — SIGNIFICANT CHANGE UP (ref 1.6–2.6)
MCHC RBC-ENTMCNC: 31.2 PG — HIGH (ref 27–31)
MCHC RBC-ENTMCNC: 34.2 G/DL — SIGNIFICANT CHANGE UP (ref 32–36)
MCV RBC AUTO: 91.4 FL — SIGNIFICANT CHANGE UP (ref 80–94)
MONOCYTES # BLD AUTO: 0.7 K/UL — SIGNIFICANT CHANGE UP (ref 0–0.8)
MONOCYTES NFR BLD AUTO: 7 % — SIGNIFICANT CHANGE UP (ref 3–10)
NEUTROPHILS # BLD AUTO: 6.6 K/UL — SIGNIFICANT CHANGE UP (ref 1.8–8)
NEUTROPHILS NFR BLD AUTO: 68.7 % — SIGNIFICANT CHANGE UP (ref 37–73)
PHOSPHATE SERPL-MCNC: 2.3 MG/DL — LOW (ref 2.4–4.7)
PLATELET # BLD AUTO: 203 K/UL — SIGNIFICANT CHANGE UP (ref 150–400)
POTASSIUM SERPL-MCNC: 3.8 MMOL/L — SIGNIFICANT CHANGE UP (ref 3.5–5.3)
POTASSIUM SERPL-SCNC: 3.8 MMOL/L — SIGNIFICANT CHANGE UP (ref 3.5–5.3)
PROT SERPL-MCNC: 6.4 G/DL — LOW (ref 6.6–8.7)
RBC # BLD: 4.29 M/UL — LOW (ref 4.6–6.2)
RBC # FLD: 15 % — SIGNIFICANT CHANGE UP (ref 11–15.6)
SODIUM SERPL-SCNC: 137 MMOL/L — SIGNIFICANT CHANGE UP (ref 135–145)
SPECIMEN SOURCE: SIGNIFICANT CHANGE UP
WBC # BLD: 9.6 K/UL — SIGNIFICANT CHANGE UP (ref 4.8–10.8)
WBC # FLD AUTO: 9.6 K/UL — SIGNIFICANT CHANGE UP (ref 4.8–10.8)

## 2017-06-05 PROCEDURE — 99232 SBSQ HOSP IP/OBS MODERATE 35: CPT

## 2017-06-05 PROCEDURE — 99233 SBSQ HOSP IP/OBS HIGH 50: CPT | Mod: GC

## 2017-06-05 RX ORDER — HYDROMORPHONE HYDROCHLORIDE 2 MG/ML
1 INJECTION INTRAMUSCULAR; INTRAVENOUS; SUBCUTANEOUS
Qty: 0 | Refills: 0 | Status: DISCONTINUED | OUTPATIENT
Start: 2017-06-05 | End: 2017-06-07

## 2017-06-05 RX ORDER — SODIUM CHLORIDE 9 MG/ML
1000 INJECTION, SOLUTION INTRAVENOUS
Qty: 0 | Refills: 0 | Status: DISCONTINUED | OUTPATIENT
Start: 2017-06-05 | End: 2017-06-06

## 2017-06-05 RX ORDER — HYDROMORPHONE HYDROCHLORIDE 2 MG/ML
2 INJECTION INTRAMUSCULAR; INTRAVENOUS; SUBCUTANEOUS
Qty: 0 | Refills: 0 | Status: DISCONTINUED | OUTPATIENT
Start: 2017-06-05 | End: 2017-06-07

## 2017-06-05 RX ADMIN — HYDROMORPHONE HYDROCHLORIDE 2 MILLIGRAM(S): 2 INJECTION INTRAMUSCULAR; INTRAVENOUS; SUBCUTANEOUS at 22:33

## 2017-06-05 RX ADMIN — SODIUM CHLORIDE 200 MILLILITER(S): 9 INJECTION, SOLUTION INTRAVENOUS at 01:09

## 2017-06-05 RX ADMIN — HYDROMORPHONE HYDROCHLORIDE 2 MILLIGRAM(S): 2 INJECTION INTRAMUSCULAR; INTRAVENOUS; SUBCUTANEOUS at 10:18

## 2017-06-05 RX ADMIN — HYDROMORPHONE HYDROCHLORIDE 1 MILLIGRAM(S): 2 INJECTION INTRAMUSCULAR; INTRAVENOUS; SUBCUTANEOUS at 02:45

## 2017-06-05 RX ADMIN — HYDROMORPHONE HYDROCHLORIDE 1 MILLIGRAM(S): 2 INJECTION INTRAMUSCULAR; INTRAVENOUS; SUBCUTANEOUS at 07:19

## 2017-06-05 RX ADMIN — Medication 100 MILLIGRAM(S): at 11:18

## 2017-06-05 RX ADMIN — HYDROMORPHONE HYDROCHLORIDE 2 MILLIGRAM(S): 2 INJECTION INTRAMUSCULAR; INTRAVENOUS; SUBCUTANEOUS at 16:34

## 2017-06-05 RX ADMIN — HYDROMORPHONE HYDROCHLORIDE 1 MILLIGRAM(S): 2 INJECTION INTRAMUSCULAR; INTRAVENOUS; SUBCUTANEOUS at 00:00

## 2017-06-05 RX ADMIN — HYDROMORPHONE HYDROCHLORIDE 0.5 MILLIGRAM(S): 2 INJECTION INTRAMUSCULAR; INTRAVENOUS; SUBCUTANEOUS at 08:58

## 2017-06-05 RX ADMIN — HYDROMORPHONE HYDROCHLORIDE 2 MILLIGRAM(S): 2 INJECTION INTRAMUSCULAR; INTRAVENOUS; SUBCUTANEOUS at 19:27

## 2017-06-05 RX ADMIN — HYDROMORPHONE HYDROCHLORIDE 2 MILLIGRAM(S): 2 INJECTION INTRAMUSCULAR; INTRAVENOUS; SUBCUTANEOUS at 10:33

## 2017-06-05 RX ADMIN — ONDANSETRON 4 MILLIGRAM(S): 8 TABLET, FILM COATED ORAL at 19:27

## 2017-06-05 RX ADMIN — HYDROMORPHONE HYDROCHLORIDE 0.5 MILLIGRAM(S): 2 INJECTION INTRAMUSCULAR; INTRAVENOUS; SUBCUTANEOUS at 09:13

## 2017-06-05 RX ADMIN — HYDROMORPHONE HYDROCHLORIDE 2 MILLIGRAM(S): 2 INJECTION INTRAMUSCULAR; INTRAVENOUS; SUBCUTANEOUS at 23:00

## 2017-06-05 RX ADMIN — HYDROMORPHONE HYDROCHLORIDE 1 MILLIGRAM(S): 2 INJECTION INTRAMUSCULAR; INTRAVENOUS; SUBCUTANEOUS at 06:04

## 2017-06-05 RX ADMIN — HYDROMORPHONE HYDROCHLORIDE 2 MILLIGRAM(S): 2 INJECTION INTRAMUSCULAR; INTRAVENOUS; SUBCUTANEOUS at 13:18

## 2017-06-05 RX ADMIN — SODIUM CHLORIDE 100 MILLILITER(S): 9 INJECTION, SOLUTION INTRAVENOUS at 08:06

## 2017-06-05 RX ADMIN — PANTOPRAZOLE SODIUM 40 MILLIGRAM(S): 20 TABLET, DELAYED RELEASE ORAL at 11:18

## 2017-06-05 RX ADMIN — Medication 100 MILLIGRAM(S): at 06:04

## 2017-06-05 RX ADMIN — HYDROMORPHONE HYDROCHLORIDE 2 MILLIGRAM(S): 2 INJECTION INTRAMUSCULAR; INTRAVENOUS; SUBCUTANEOUS at 16:19

## 2017-06-05 RX ADMIN — ENOXAPARIN SODIUM 40 MILLIGRAM(S): 100 INJECTION SUBCUTANEOUS at 11:21

## 2017-06-05 RX ADMIN — PANTOPRAZOLE SODIUM 40 MILLIGRAM(S): 20 TABLET, DELAYED RELEASE ORAL at 22:33

## 2017-06-05 RX ADMIN — HYDROMORPHONE HYDROCHLORIDE 1 MILLIGRAM(S): 2 INJECTION INTRAMUSCULAR; INTRAVENOUS; SUBCUTANEOUS at 03:00

## 2017-06-05 RX ADMIN — ONDANSETRON 4 MILLIGRAM(S): 8 TABLET, FILM COATED ORAL at 12:52

## 2017-06-05 RX ADMIN — HYDROMORPHONE HYDROCHLORIDE 2 MILLIGRAM(S): 2 INJECTION INTRAMUSCULAR; INTRAVENOUS; SUBCUTANEOUS at 13:33

## 2017-06-05 RX ADMIN — SODIUM CHLORIDE 100 MILLILITER(S): 9 INJECTION, SOLUTION INTRAVENOUS at 21:33

## 2017-06-05 NOTE — PROGRESS NOTE ADULT - PROBLEM SELECTOR PLAN 1
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 If elevated, then do 24 urine.   -today pt reports that pain remains unbearable, no episodes of vomiting but pt was dry heaving   -c/w clear liquid diet  -c/w IVF NS @ 150 ml/hr  -c/w dilaudid 1 g IVP Q3H PRN severe pain  -c/w dilaudid 0.5g IVP Q3H PRN moderate pain  -c/w hot showers for relief if secondary to marijuana hyperemesis   -prior colonoscopy one year ago negative and multiple hospitalizations for similar symptoms   -GI f/u noted and appreciated and agree with acute intermittent porphyria work up  -f/u urine porphobilinogen/urine ALA   -Patient counseled on abstinence from drug use and advised that may be contributing to his current GI problem.  -Denies ever having an endoscopy;  had colonoscopy 1 year ago; with associated Nausea/vomiting likely secondary to marijuana hyperemesis syndrome. there is also concern for acute intermittent porphyria   -Ordered spot urine PBG, ALA and total porphoryin. If elevated, then do 24 urine.   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 with associated Nausea/vomiting likely secondary to marijuana hyperemesis syndrome. there is also concern for acute intermittent porphyria.   -pain has reduced in intensity but still unbearable; no episodes of vomiting, since yesterday;  nausea persists. Patient reports similar sx in his mother who was also never diagnosed and continues to endure the pain.   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

## 2017-06-05 NOTE — PROGRESS NOTE ADULT - SUBJECTIVE AND OBJECTIVE BOX
INTERVAL HPI/OVERNIGHT EVENTS:  Patient is a 21y Male Admitted with chief complaint of abdominal pain & vomiting (2017 00:35)    Patient seen and examined at bedside, No acute overnight events.  Patient is calmer and less agitated this morning.  Yesterday patient was crying 2/2 pain and anxiety, this has resolved today;     Patient stated that the abdominal pain and vomiting have improved.  Denies any fever, chest pain, SOB, abdominal pain, diarrhea, constipation, calf pain.    ROS: denies fever, chills, chest pain, SOB, diarrhea, calf pain.    Allergies    No Known Allergies      Vital Signs Last 24 Hrs  T(C): 37.3, Max: 37.3 ( @ 09:36)  T(F): 99.1, Max: 99.1 ( @ 09:36)   Afebrile    HR: 89 (75 - 89)  BP: 126/59 (100/50 - 126/59)   Normotensive  RR: 18 (16 - 18)  SpO2: 94% (94% - 97%)    Physical Exam:   GENERAL: well-groomed, well-developed, sleeping comfortably in bed then in severe distress & agitated due to intractable abdominal pain once awoken    HEAD:  Atraumatic, Normocephalic  EYES: EOMI, PERRLA, conjunctiva and sclera clear  ENMT: No tonsillar erythema, exudates, or enlargement; Moist mucous membranes  NECK: Supple, No JVD, Normal thyroid  NERVOUS SYSTEM: AOx3, patient was non-cooperative with exam  CHEST/LUNG: Clear to auscultation bilaterally; No rales, rhonchi, wheezing, or rubs  HEART: regular rate, irregular rhythm; No murmurs, rubs, or gallops  ABDOMEN: Soft, Nondistended; Bowel sounds present, severe tenderness to palpation, guarding   EXTREMITIES:  2+ Peripheral Pulses, No clubbing, cyanosis, or edema, normal range of motion  LYMPH: No lymphadenopathy noted  SKIN: multiple raised erythematous skin lesions (right knee, posterior thigh, left medial thigh)    I & Os for current day (as of  @ 06:56)  =============================================  IN: 120 ml / OUT: 0 ml / NET: 120 ml      Labs:                           12.6   15.8  )-----------( 206      ( 2017 18:09 )             36.6       06-04    137  |  101  |  12.0  ----------------------------<  107  3.3<L>   |  26.0  |  0.52    Ca    8.6      2017 13:48  Phos  3.5     06-04  Mg     1.9     06-04    TPro  6.3<L>  /  Alb  4.3  /  TBili  0.6  /  DBili  x   /  AST  70<H>  /  ALT  13  /  AlkPhos  97  06-04              Urinalysis Basic - ( 2017 11:53 )    Color: Pale Yellow / Appearance: Clear / S.015 / pH: x  Gluc: x / Ketone: Trace  / Bili: Negative / Urobili: Negative mg/dL   Blood: x / Protein: Negative mg/dL / Nitrite: Negative   Leuk Esterase: Negative / RBC: x / WBC x   Sq Epi: x / Non Sq Epi: x / Bacteria: x            Lactate Trend      CARDIAC MARKERS ( 2017 13:48 )  x     / x     / 2238 U/L / x     / 35.4 ng/mL  CARDIAC MARKERS ( 2017 09:38 )  x     / <0.01 ng/mL / x     / x     / x      CARDIAC MARKERS ( 2017 01:05 )  x     / x     / 1800 U/L / x     / 47.3 ng/mL        CAPILLARY BLOOD GLUCOSE  105 (2017 07:06)                             14.9   25.2  )-----------( 230      ( 2017 20:29 )             42.4   06-03    139  |  98  |  14.0  ----------------------------<  131<H>  4.4   |  20.0<L>  |  0.59    Ca    10.2      2017 16:00  Phos  3.5     06-04  Mg     1.9     06-04    TPro  8.4  /  Alb  5.2  /  TBili  0.7  /  DBili  x   /  AST  52<H>  /  ALT  14  /  AlkPhos  134<H>  06-03    Venous lactate: 2.4        MEDICATIONS  (STANDING):  pantoprazole  Injectable 40milliGRAM(s) IV Push every 12 hours  enoxaparin Injectable 40milliGRAM(s) SubCutaneous daily  ceFAZolin   IVPB  IV Intermittent   ceFAZolin   IVPB 1000milliGRAM(s) IV Intermittent every 8 hours  thiamine 100milliGRAM(s) Oral daily  dextrose 5% + sodium chloride 0.9%. 1000milliLiter(s) IV Continuous <Continuous>    MEDICATIONS  (PRN):  ondansetron Injectable 4milliGRAM(s) IV Push every 6 hours PRN Nausea and/or Vomiting  HYDROmorphone  Injectable 0.5milliGRAM(s) IV Push every 3 hours PRN Moderate Pain (4 - 6)  LORazepam   Injectable 1milliGRAM(s) IntraMuscular once PRN Anxiety or agitation INTERVAL HPI/OVERNIGHT EVENTS:  Patient is a 21y Male Admitted with chief complaint of abdominal pain & vomiting (2017 00:35)    Patient seen and examined at bedside, No acute overnight events.  Patient is calmer and less agitated this morning.  Yesterday patient was crying 2/2 pain and anxiety, this has resolved today;     Patient stated that the abdominal pain and vomiting have improved, yesterday pain was a 10/10, now it is a 7/10;    Denies any fever, chest pain, SOB, diarrhea, calf pain. last bm was 3 days ago;      ROS: denies fever, chills, chest pain, SOB, diarrhea, calf pain.    Allergies    No Known Allergies      Vital Signs Last 24 Hrs  T(C): 37.3, Max: 37.3 ( @ 09:36)  T(F): 99.1, Max: 99.1 ( @ 09:36)   Afebrile    HR: 89 (75 - 89)  BP: 126/59 (100/50 - 126/59)   Normotensive  RR: 18 (16 - 18)  SpO2: 94% (94% - 97%)    Physical Exam:   GENERAL: laying in bed, in distress 2/2 abdominal pain;     HEAD:  Atraumatic, Normocephalic  EYES: EOMI, 2mm b/l; PERRLA, conjunctiva and sclera clear  ENMT: No tonsillar erythema, exudates, or enlargement; Moist mucous membranes  NECK: Supple, No JVD, Normal thyroid  NERVOUS SYSTEM: A&Ox3, patient was non-cooperative with exam  CHEST/LUNG: CTAB; No rales, rhonchi, wheezing, or rubs  HEART: S1, S2, regular rate, irregular rhythm; No murmurs, rubs, or gallops  ABDOMEN: Soft, Nondistended; Bowel sounds present, tenderness to palpation in the epigastrium, + guarding; no rebound;     EXTREMITIES:  2+ Peripheral Pulses, No clubbing, cyanosis, or edema, normal range of motion  LYMPH: No lymphadenopathy noted  SKIN: multiple flat erythematous skin lesions (right knee,right posterior thigh, left medial thigh);  TTP in rash on right posterior thigh;  warm to touch;  spares palms/soles/face;      I & Os for current day (as of  @ 06:56)  =============================================  IN: 120 ml / OUT: 0 ml / NET: 120 ml      Labs:                           12.6   15.8  )-----------( 206      ( 2017 18:09 )             36.6       06-04    137  |  101  |  12.0  ----------------------------<  107  3.3<L>   |  26.0  |  0.52    Ca    8.6      2017 13:48  Phos  3.5     06-04  Mg     1.9     06-04    TPro  6.3<L>  /  Alb  4.3  /  TBili  0.6  /  DBili  x   /  AST  70<H>  /  ALT  13  /  AlkPhos  97  06-04              Urinalysis Basic - ( 2017 11:53 )    Color: Pale Yellow / Appearance: Clear / S.015 / pH: x  Gluc: x / Ketone: Trace  / Bili: Negative / Urobili: Negative mg/dL   Blood: x / Protein: Negative mg/dL / Nitrite: Negative   Leuk Esterase: Negative / RBC: x / WBC x   Sq Epi: x / Non Sq Epi: x / Bacteria: x            Lactate Trend      CARDIAC MARKERS ( 2017 13:48 )  x     / x     / 2238 U/L / x     / 35.4 ng/mL  CARDIAC MARKERS ( 2017 09:38 )  x     / <0.01 ng/mL / x     / x     / x      CARDIAC MARKERS ( 2017 01:05 )  x     / x     / 1800 U/L / x     / 47.3 ng/mL        CAPILLARY BLOOD GLUCOSE  105 (2017 07:06)                             14.9   25.2  )-----------( 230      ( 2017 20:29 )             42.4   06-03    139  |  98  |  14.0  ----------------------------<  131<H>  4.4   |  20.0<L>  |  0.59    Ca    10.2      2017 16:00  Phos  3.5     06-04  Mg     1.9     06-04    TPro  8.4  /  Alb  5.2  /  TBili  0.7  /  DBili  x   /  AST  52<H>  /  ALT  14  /  AlkPhos  134<H>  06-03    Venous lactate: 2.4        MEDICATIONS  (STANDING):  pantoprazole  Injectable 40milliGRAM(s) IV Push every 12 hours  enoxaparin Injectable 40milliGRAM(s) SubCutaneous daily  ceFAZolin   IVPB  IV Intermittent   ceFAZolin   IVPB 1000milliGRAM(s) IV Intermittent every 8 hours  thiamine 100milliGRAM(s) Oral daily  dextrose 5% + sodium chloride 0.9%. 1000milliLiter(s) IV Continuous <Continuous>    MEDICATIONS  (PRN):  ondansetron Injectable 4milliGRAM(s) IV Push every 6 hours PRN Nausea and/or Vomiting  HYDROmorphone  Injectable 0.5milliGRAM(s) IV Push every 3 hours PRN Moderate Pain (4 - 6)  LORazepam   Injectable 1milliGRAM(s) IntraMuscular once PRN Anxiety or agitation

## 2017-06-05 NOTE — PROGRESS NOTE ADULT - ATTENDING COMMENTS
Note addended where needed. Patient counseled on abstinence from drug use. Patient was on 1:1 observation bc he was under the influence in the ER but currently is pleasant and 1:1 was discontinued. Note addended where needed

## 2017-06-05 NOTE — PROGRESS NOTE ADULT - PROBLEM SELECTOR PLAN 4
LFT's slightly elevated  -f/u acute hepatitis panel LFT's enzymes trending down;    -f/u acute hepatitis panel; LFT's continue to trend down   acute hepatitis panel negative

## 2017-06-05 NOTE — PROGRESS NOTE ADULT - SUBJECTIVE AND OBJECTIVE BOX
Patient seen and examined;  Alleges to have persistent abdominal pain;  barely tolerateing clear liquid diet.  Initial CT scan from ER 3 days ago was completely normal.  Absent GB.  No pancreatitis.    Alleges that he had HP on an EGD 1.5 yrs ago; and alleges that he was treated with triple antibiotics x 2 weeks.  ? NOLAN.  No hx of PUD.  No bleeding.  Alleges that AM abdominal pain is diffuse abd lower and radiates to epigastric region.      PAST MEDICAL & SURGICAL HISTORY:  Gastritis  No pertinent past medical history  S/P cholecystectomy  No significant past surgical history      ROS:  No Heartburn, regurgitation, dysphagia, odynophagia.  No dyspepsia  No abdominal pain.    No Nausea, vomiting.  No Bleeding.  No hematemesis.   No diarrhea.    No hematochesia.  No weight loss, anorexia.  No edema.      MEDICATIONS  (STANDING):  pantoprazole  Injectable 40milliGRAM(s) IV Push every 12 hours  enoxaparin Injectable 40milliGRAM(s) SubCutaneous daily  thiamine 100milliGRAM(s) Oral daily  dextrose 5% + sodium chloride 0.9%. 1000milliLiter(s) IV Continuous <Continuous>    MEDICATIONS  (PRN):  ondansetron Injectable 4milliGRAM(s) IV Push every 6 hours PRN Nausea and/or Vomiting  HYDROmorphone  Injectable 2milliGRAM(s) IV Push every 3 hours PRN Severe Pain (7 - 10)  HYDROmorphone  Injectable 1milliGRAM(s) IV Push every 3 hours PRN Moderate Pain (4 - 6)      Allergies    No Known Allergies    Intolerances        Vital Signs Last 24 Hrs  T(C): 36.9, Max: 37.3 (06-04 @ 23:48)  T(F): 98.5, Max: 99.1 (06-04 @ 23:48)  HR: 58 (58 - 89)  BP: 109/63 (109/63 - 126/59)  BP(mean): --  RR: 19 (18 - 19)  SpO2: 96% (94% - 97%)    PHYSICAL EXAM:    GENERAL: NAD, well-groomed, well-developed  HEAD:  Atraumatic, Normocephalic  EYES: EOMI, PERRLA, conjunctiva and sclera clear  ENMT: No tonsillar erythema, exudates, or enlargement; Moist mucous membranes, Good dentition, No lesions  NECK: Supple, No JVD, Normal thyroid  CHEST/LUNG: Clear to percussion bilaterally; No rales, rhonchi, wheezing, or rubs  HEART: Regular rate and rhythm; No murmurs, rubs, or gallops  ABDOMEN: Soft, Nontender, Nondistended; Bowel sounds present  EXTREMITIES:  2+ Peripheral Pulses, No clubbing, cyanosis, or edema  LYMPH: No lymphadenopathy noted  SKIN: No rashes or lesions      LABS:                        13.4   9.6   )-----------( 203      ( 2017 07:58 )             39.2     06-05    137  |  102  |  7.0<L>  ----------------------------<  97  3.8   |  26.0  |  0.54    Ca    8.6      2017 07:58  Phos  2.3     06-05  Mg     1.8     06-05    TPro  6.4<L>  /  Alb  4.2  /  TBili  0.6  /  DBili  x   /  AST  54<H>  /  ALT  14  /  AlkPhos  95  06-05       Urinalysis Basic - ( 2017 11:53 )    Color: Pale Yellow / Appearance: Clear / S.015 / pH: x  Gluc: x / Ketone: Trace  / Bili: Negative / Urobili: Negative mg/dL   Blood: x / Protein: Negative mg/dL / Nitrite: Negative   Leuk Esterase: Negative / RBC: x / WBC x   Sq Epi: x / Non Sq Epi: x / Bacteria: x          RADIOLOGY & ADDITIONAL STUDIES:  Recent CT:  WNL.  Absent GB. Same as CT 2 yrs ago.

## 2017-06-05 NOTE — PROGRESS NOTE ADULT - PROBLEM SELECTOR PLAN 3
with concern for cellulitis, slight warmth and erythema of R knee, inner left thigh and small areas of b/l legs although elevated wbc/lactate could all be secondary to vomiting pt is empirically being tx for cellulitis but less likely and no evidence of septic joint. 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   -dermatology consulted   -ortho consult noted and appreciated  -will c/w cefazolin for now   -f/u Bcx with concern for cellulitis, slight warmth and erythema of R knee, inner left thigh and small areas of b/l legs although elevated wbc/lactate could all be secondary to vomiting pt is empirically being tx for cellulitis but less likely and no evidence of septic joint. 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   -dermatology consulted   -ortho consult noted and appreciated, they do not believe this is septic arthritis;   -cefazolin d/c today as not likely cellulitis;    -dermatology feels patient has either resolving fixed drug eruption vs lichenoid dermatitis of unknown etiology;  mycoplasma IgM ordered;    -f/u Bcx right knee, left thigh anterolateral and medial aspects as well as the right thigh laterally and on the posterior aspect non blanching palpable purpuric patches no evidence of cellulitis and cefazolin was discontinued.  Dermatlogy consult noted and appreciated and looks like resolving fixed drug eruption or a lichenoid dermatitis of unknown etiology that requires no tx at this time also feels these specific skin findings do not correspond to Acute intermittent porphyria.

## 2017-06-05 NOTE — PROGRESS NOTE ADULT - PROBLEM SELECTOR PLAN 2
Initially when patient was admitted he was under the influence of marijuana and was aggressive/ shaking, unclear etiology   -c/w trending ck levels  -increased IVF to 150ml/hr Initially when patient was admitted he was under the influence of marijuana and was aggressive/ shaking, unclear etiology   -c/w trending ck levels  -increased IVF to 150ml/hr  -ck levels trending down; Initially when patient was admitted he was under the influence of marijuana and was aggressive/ shaking, unclear etiology   -c/w trending ck levels  -c/w IVF   -ck levels trending down; today with [id number]

## 2017-06-05 NOTE — PROGRESS NOTE ADULT - PROBLEM SELECTOR PLAN 1
Unclear etiology.  Alleged hx of HP, previously treated.  Perhaps recrudescence or recurrence of disease. EGD in AM to exclude same.

## 2017-06-05 NOTE — PROGRESS NOTE ADULT - ASSESSMENT
Recurrent attacks of abdominal pain.  Alleged hx of HP, treated.  Unclear if resolved.    Will schedule for AM EGD to exclude any gastric pathology.  Bx for HP.

## 2017-06-05 NOTE — CONSULT NOTE ADULT - SUBJECTIVE AND OBJECTIVE BOX
HPI:  21 year old male smoker w/ PMH of gastritis & substance abuse s/p cholecystectomy brought to ED by his mother for acute onset abdominal pain. Limited history was obtained as patient was a poor historian and constantly demanding more pain medication. Patient stated that his abdominal pain was epigastric, "squeezing", 10/10, constant, non-radiating, alleviated w/ dilaudid, no exacerbating factors, associated w/ 5-6 episodes of nausea & bilious vomiting. Patient admits to smoking marijuana and claims that the time he consumed alcohol was 2-3 weeks ago. Patient was seen in ED on 2016 for a similar episode but the workup was negative. Pt cannot exactly recall when his rash began. However, he complains that his right knee seems to lock up (there is a rash there). He denies any medications or herbal supplements or any otc medications as outpatient. His mother has various rashes associated with diabetes. His eruption is completely asymptomatic.    Patient is s/p zofran 4 mg IVP x2, reglan 10 mg IVP x1, pepcid 20 mg IVP x1, toradol 30 mg IVP x1, dilaudid 1 mg IVP x2,  ativan 1 mg IVP x2 & 1L NS in the ED (2017 00:35)      PAST MEDICAL & SURGICAL HISTORY:  Gastritis  No pertinent past medical history  S/P cholecystectomy  No significant past surgical history      REVIEW OF SYSTEMS:    CONSTITUTIONAL: No fever, weight loss, or fatigue  LYMPH NODES: No enlarged glands  SKIN: rashes    MEDICATIONS  (STANDING):  pantoprazole  Injectable 40milliGRAM(s) IV Push every 12 hours  enoxaparin Injectable 40milliGRAM(s) SubCutaneous daily  thiamine 100milliGRAM(s) Oral daily  dextrose 5% + sodium chloride 0.9%. 1000milliLiter(s) IV Continuous <Continuous>    MEDICATIONS  (PRN):  ondansetron Injectable 4milliGRAM(s) IV Push every 6 hours PRN Nausea and/or Vomiting  HYDROmorphone  Injectable 0.5milliGRAM(s) IV Push every 3 hours PRN Moderate Pain (4 - 6)  LORazepam   Injectable 1milliGRAM(s) IntraMuscular once PRN Anxiety or agitation      Allergies    No Known Allergies    Intolerances          FAMILY HISTORY:  No pertinent family history in first degree relatives  denies any skin history      Vital Signs Last 24 Hrs  T(C): 37.3, Max: 37.3 ( @ 09:36)  T(F): 99.1, Max: 99.1 ( @ 09:36)  HR: 89 (75 - 89)  BP: 126/59 (100/50 - 126/59)  BP(mean): --  RR: 18 (16 - 18)  SpO2: 94% (94% - 97%)    PHYSICAL EXAM:    GENERAL: NAD, well-groomed, well-developed  HEAD:  Atraumatic, Normocephalic  EYES: EOMI, PERRLA, conjunctiva and sclera clear  SKIN: nonblanching, nonpalpable erythematous to light purpuric patches with no overlying epidermal changes involving his right knee, left thigh anterolateral and medial aspects and his right thigh laterally and on the posterior aspect, not tender or warm to the touch. There are lesions that have completely faded/resolved. His palms and soles are normal. His face, torso and arms are clear.      LABS:                        12.6   15.8  )-----------( 206      ( 2017 18:09 )             36.6     06-    137  |  101  |  12.0  ----------------------------<  107  3.3<L>   |  26.0  |  0.52    Ca    8.6      2017 13:48  Phos  3.5     -  Mg     1.9         TPro  6.3<L>  /  Alb  4.3  /  TBili  0.6  /  DBili  x   /  AST  70<H>  /  ALT  13  /  AlkPhos  97  -      Urinalysis Basic - ( 2017 11:53 )    Color: Pale Yellow / Appearance: Clear / S.015 / pH: x  Gluc: x / Ketone: Trace  / Bili: Negative / Urobili: Negative mg/dL   Blood: x / Protein: Negative mg/dL / Nitrite: Negative   Leuk Esterase: Negative / RBC: x / WBC x   Sq Epi: x / Non Sq Epi: x / Bacteria: x      Sedimentation Rate, Erythrocyte: 6 mm/hr ( @ 18:09)    RADIOLOGY & ADDITIONAL STUDIES: pending    ASSESSMENT/PLAN:  Resolving fixed drug eruption or a lichenoid dermatitis of unknown etiology as he denies use of any medications (prescription/otc). There are no skin manifestations of acute intermittent porphyria. As this is asymptomatic and there are signs of resolution, no treatment needed at this time.    Please call me back if he keeps erupting or if he gets any new, fresh lesions. I told him to followup in my office upon discharge if these remain active.    Thanks.    Flor Arnold MD

## 2017-06-05 NOTE — PROGRESS NOTE ADULT - PROBLEM SELECTOR PLAN 6
-Drug screen came back positive for THC -pt was under the influence on admission   -patient counseled on abstinence from drug use   -Social work consulted

## 2017-06-05 NOTE — PROGRESS NOTE ADULT - ASSESSMENT
21 year old male smoker w/ PMH of gastritis & substance abuse (active marijuana use) s/p cholecystectomy, prior multiple hospitalizations for abd pain/N/V and completed GI work up with negative colonoscopy 1 yr ago currently brought to ED  for acute onset of abdominal pain and admitted for intractable abdominal pain/N/V.     Patient with leukocytosis, elevated lactate and starvation ketosis on labs which is likely secondary to intractable vomiting. Also noted to have rhabdomyolysis 21 year old male smoker w/ PMH of gastritis & substance abuse (active marijuana use) s/p cholecystectomy, prior multiple hospitalizations for abd pain/N/V and completed GI work up with negative colonoscopy 1 yr ago currently brought to ED  for acute onset of abdominal pain and admitted for intractable abdominal pain/N/V.     Patient with leukocytosis, elevated lactate and starvation ketosis on labs which is likely secondary to intractable vomiting. Also noted to have rhabdomyolysis    Today:  Fluids switched to D5, ALA ordered, Cefazolin d/c because likely not cellulitis;  Dermatology consult will see patient today; 21 year old male smoker w/ PMH of gastritis & substance abuse (active marijuana use) s/p cholecystectomy, prior multiple hospitalizations for abd pain/N/V and completed GI work up with negative colonoscopy 1 yr ago currently brought to ED  for acute onset of abdominal pain and admitted for intractable abdominal pain/N/V.     Patient with leukocytosis, elevated lactate and starvation ketosis on labs which is likely secondary to intractable vomiting. Also noted to have rhabdomyolysis    Today:  Fluids switched to D5, ALA ordered, Cefazolin d/c because likely not cellulitis;  Dermatology consult feels patient has either resolving fixed drug eruption or a lichenoid dermatitis of unknown etiology, pt f/u dermatology on an outpatient basis, recommended getting mycoplasma IgM; 21 year old male smoker w/ PMH of gastritis & substance abuse (active marijuana use) s/p cholecystectomy, prior multiple hospitalizations for abd pain/N/V and completed GI work up with negative colonoscopy 1 yr ago currently brought to ED  for acute onset of abdominal pain and admitted for intractable abdominal pain/N/V. Patient with leukocytosis, elevated lactate and starvation ketosis on labs which is likely secondary to intractable vomiting. Also noted to have rhabdomyolysis. Differential includes Cannibis hyperemesis syndrome vs acute intermittent porphyria.

## 2017-06-06 ENCOUNTER — RESULT REVIEW (OUTPATIENT)
Age: 22
End: 2017-06-06

## 2017-06-06 DIAGNOSIS — K29.30 CHRONIC SUPERFICIAL GASTRITIS WITHOUT BLEEDING: ICD-10-CM

## 2017-06-06 DIAGNOSIS — N50.89 OTHER SPECIFIED DISORDERS OF THE MALE GENITAL ORGANS: ICD-10-CM

## 2017-06-06 LAB
ANION GAP SERPL CALC-SCNC: 13 MMOL/L — SIGNIFICANT CHANGE UP (ref 5–17)
BASOPHILS # BLD AUTO: 0 K/UL — SIGNIFICANT CHANGE UP (ref 0–0.2)
BASOPHILS NFR BLD AUTO: 0.1 % — SIGNIFICANT CHANGE UP (ref 0–2)
BUN SERPL-MCNC: 6 MG/DL — LOW (ref 8–20)
CALCIUM SERPL-MCNC: 9.4 MG/DL — SIGNIFICANT CHANGE UP (ref 8.6–10.2)
CHLORIDE SERPL-SCNC: 102 MMOL/L — SIGNIFICANT CHANGE UP (ref 98–107)
CK MB CFR SERPL CALC: 5.7 NG/ML — SIGNIFICANT CHANGE UP (ref 0–6.7)
CK SERPL-CCNC: 580 U/L — HIGH (ref 30–200)
CO2 SERPL-SCNC: 25 MMOL/L — SIGNIFICANT CHANGE UP (ref 22–29)
CREAT SERPL-MCNC: 0.51 MG/DL — SIGNIFICANT CHANGE UP (ref 0.5–1.3)
EOSINOPHIL # BLD AUTO: 0 K/UL — SIGNIFICANT CHANGE UP (ref 0–0.5)
EOSINOPHIL NFR BLD AUTO: 0.5 % — SIGNIFICANT CHANGE UP (ref 0–5)
GLUCOSE SERPL-MCNC: 109 MG/DL — SIGNIFICANT CHANGE UP (ref 70–115)
HCT VFR BLD CALC: 40.9 % — LOW (ref 42–52)
HGB BLD-MCNC: 14.2 G/DL — SIGNIFICANT CHANGE UP (ref 14–18)
LYMPHOCYTES # BLD AUTO: 2.7 K/UL — SIGNIFICANT CHANGE UP (ref 1–4.8)
LYMPHOCYTES # BLD AUTO: 37.1 % — SIGNIFICANT CHANGE UP (ref 20–55)
MCHC RBC-ENTMCNC: 31.6 PG — HIGH (ref 27–31)
MCHC RBC-ENTMCNC: 34.7 G/DL — SIGNIFICANT CHANGE UP (ref 32–36)
MCV RBC AUTO: 90.9 FL — SIGNIFICANT CHANGE UP (ref 80–94)
MONOCYTES # BLD AUTO: 0.6 K/UL — SIGNIFICANT CHANGE UP (ref 0–0.8)
MONOCYTES NFR BLD AUTO: 8.8 % — SIGNIFICANT CHANGE UP (ref 3–10)
NEUTROPHILS # BLD AUTO: 3.9 K/UL — SIGNIFICANT CHANGE UP (ref 1.8–8)
NEUTROPHILS NFR BLD AUTO: 53.4 % — SIGNIFICANT CHANGE UP (ref 37–73)
PLATELET # BLD AUTO: 218 K/UL — SIGNIFICANT CHANGE UP (ref 150–400)
POTASSIUM SERPL-MCNC: 3.6 MMOL/L — SIGNIFICANT CHANGE UP (ref 3.5–5.3)
POTASSIUM SERPL-SCNC: 3.6 MMOL/L — SIGNIFICANT CHANGE UP (ref 3.5–5.3)
RBC # BLD: 4.5 M/UL — LOW (ref 4.6–6.2)
RBC # FLD: 14.6 % — SIGNIFICANT CHANGE UP (ref 11–15.6)
SODIUM SERPL-SCNC: 140 MMOL/L — SIGNIFICANT CHANGE UP (ref 135–145)
WBC # BLD: 7.3 K/UL — SIGNIFICANT CHANGE UP (ref 4.8–10.8)
WBC # FLD AUTO: 7.3 K/UL — SIGNIFICANT CHANGE UP (ref 4.8–10.8)

## 2017-06-06 PROCEDURE — 99233 SBSQ HOSP IP/OBS HIGH 50: CPT | Mod: GC

## 2017-06-06 PROCEDURE — 88305 TISSUE EXAM BY PATHOLOGIST: CPT | Mod: 26

## 2017-06-06 PROCEDURE — 88341 IMHCHEM/IMCYTCHM EA ADD ANTB: CPT | Mod: 26

## 2017-06-06 PROCEDURE — 76870 US EXAM SCROTUM: CPT | Mod: 26

## 2017-06-06 PROCEDURE — 43239 EGD BIOPSY SINGLE/MULTIPLE: CPT

## 2017-06-06 PROCEDURE — 88342 IMHCHEM/IMCYTCHM 1ST ANTB: CPT | Mod: 26

## 2017-06-06 RX ORDER — SODIUM CHLORIDE 9 MG/ML
1000 INJECTION, SOLUTION INTRAVENOUS
Qty: 0 | Refills: 0 | Status: DISCONTINUED | OUTPATIENT
Start: 2017-06-06 | End: 2017-06-07

## 2017-06-06 RX ADMIN — HYDROMORPHONE HYDROCHLORIDE 1 MILLIGRAM(S): 2 INJECTION INTRAMUSCULAR; INTRAVENOUS; SUBCUTANEOUS at 06:43

## 2017-06-06 RX ADMIN — HYDROMORPHONE HYDROCHLORIDE 2 MILLIGRAM(S): 2 INJECTION INTRAMUSCULAR; INTRAVENOUS; SUBCUTANEOUS at 05:07

## 2017-06-06 RX ADMIN — HYDROMORPHONE HYDROCHLORIDE 2 MILLIGRAM(S): 2 INJECTION INTRAMUSCULAR; INTRAVENOUS; SUBCUTANEOUS at 23:26

## 2017-06-06 RX ADMIN — ONDANSETRON 4 MILLIGRAM(S): 8 TABLET, FILM COATED ORAL at 17:21

## 2017-06-06 RX ADMIN — HYDROMORPHONE HYDROCHLORIDE 2 MILLIGRAM(S): 2 INJECTION INTRAMUSCULAR; INTRAVENOUS; SUBCUTANEOUS at 09:55

## 2017-06-06 RX ADMIN — HYDROMORPHONE HYDROCHLORIDE 2 MILLIGRAM(S): 2 INJECTION INTRAMUSCULAR; INTRAVENOUS; SUBCUTANEOUS at 16:52

## 2017-06-06 RX ADMIN — ONDANSETRON 4 MILLIGRAM(S): 8 TABLET, FILM COATED ORAL at 23:26

## 2017-06-06 RX ADMIN — PANTOPRAZOLE SODIUM 40 MILLIGRAM(S): 20 TABLET, DELAYED RELEASE ORAL at 13:40

## 2017-06-06 RX ADMIN — HYDROMORPHONE HYDROCHLORIDE 1 MILLIGRAM(S): 2 INJECTION INTRAMUSCULAR; INTRAVENOUS; SUBCUTANEOUS at 06:35

## 2017-06-06 RX ADMIN — Medication 25 MILLIGRAM(S): at 16:43

## 2017-06-06 RX ADMIN — HYDROMORPHONE HYDROCHLORIDE 2 MILLIGRAM(S): 2 INJECTION INTRAMUSCULAR; INTRAVENOUS; SUBCUTANEOUS at 13:50

## 2017-06-06 RX ADMIN — SODIUM CHLORIDE 75 MILLILITER(S): 9 INJECTION, SOLUTION INTRAVENOUS at 17:57

## 2017-06-06 RX ADMIN — SODIUM CHLORIDE 75 MILLILITER(S): 9 INJECTION, SOLUTION INTRAVENOUS at 20:20

## 2017-06-06 RX ADMIN — HYDROMORPHONE HYDROCHLORIDE 2 MILLIGRAM(S): 2 INJECTION INTRAMUSCULAR; INTRAVENOUS; SUBCUTANEOUS at 20:35

## 2017-06-06 RX ADMIN — HYDROMORPHONE HYDROCHLORIDE 2 MILLIGRAM(S): 2 INJECTION INTRAMUSCULAR; INTRAVENOUS; SUBCUTANEOUS at 13:38

## 2017-06-06 RX ADMIN — HYDROMORPHONE HYDROCHLORIDE 2 MILLIGRAM(S): 2 INJECTION INTRAMUSCULAR; INTRAVENOUS; SUBCUTANEOUS at 23:41

## 2017-06-06 RX ADMIN — PANTOPRAZOLE SODIUM 40 MILLIGRAM(S): 20 TABLET, DELAYED RELEASE ORAL at 23:26

## 2017-06-06 RX ADMIN — HYDROMORPHONE HYDROCHLORIDE 2 MILLIGRAM(S): 2 INJECTION INTRAMUSCULAR; INTRAVENOUS; SUBCUTANEOUS at 01:53

## 2017-06-06 RX ADMIN — HYDROMORPHONE HYDROCHLORIDE 2 MILLIGRAM(S): 2 INJECTION INTRAMUSCULAR; INTRAVENOUS; SUBCUTANEOUS at 16:42

## 2017-06-06 RX ADMIN — HYDROMORPHONE HYDROCHLORIDE 2 MILLIGRAM(S): 2 INJECTION INTRAMUSCULAR; INTRAVENOUS; SUBCUTANEOUS at 05:45

## 2017-06-06 RX ADMIN — HYDROMORPHONE HYDROCHLORIDE 2 MILLIGRAM(S): 2 INJECTION INTRAMUSCULAR; INTRAVENOUS; SUBCUTANEOUS at 20:20

## 2017-06-06 RX ADMIN — Medication 100 MILLIGRAM(S): at 13:39

## 2017-06-06 RX ADMIN — SODIUM CHLORIDE 75 MILLILITER(S): 9 INJECTION, SOLUTION INTRAVENOUS at 18:07

## 2017-06-06 RX ADMIN — HYDROMORPHONE HYDROCHLORIDE 2 MILLIGRAM(S): 2 INJECTION INTRAMUSCULAR; INTRAVENOUS; SUBCUTANEOUS at 09:39

## 2017-06-06 RX ADMIN — HYDROMORPHONE HYDROCHLORIDE 2 MILLIGRAM(S): 2 INJECTION INTRAMUSCULAR; INTRAVENOUS; SUBCUTANEOUS at 01:20

## 2017-06-06 NOTE — PROGRESS NOTE ADULT - PROBLEM SELECTOR PLAN 1
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 Patient reports similar sx in his mother who was also never diagnosed and continues to endure the pain.   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 Patient reports similar sx in his mother who was also never diagnosed and continues to endure the pain.   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 Patient reports similar sx in his mother who was also never diagnosed and continues to endure the pain.   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 Patient reports similar sx in his mother who was also never diagnosed and continues to endure the pain.   -c/w d5ns @75cc/hr   -c/w increased dose of dilaudid 2mg IVP Q3H PRN severe pain  -c/w increased dose of dilaudid 1mg IVP Q3H PRN moderate pain  -hot showers did not provide any relief to the patient, which makes the marijuana associated hyperemesis;    -prior colonoscopy one year ago negative and multiple hospitalizations for similar symptoms;  Mother has similar abdominal symptoms;    -f/u urine porphobilinogen/ 24hr ALA urine   -patient reported having one episode of dark stools -h/h stable will f/u occult stool   -GI f/u noted and appreciated   -EGD planned for today

## 2017-06-06 NOTE — PROGRESS NOTE ADULT - PROBLEM SELECTOR PLAN 6
-Drug screen came back positive for THC -pt was under the influence on admission   -patient counseled on abstinence from drug use   -Social work consulted -Drug screen came back positive for THC -pt was under the influence on admission   -patient counseled on abstinence from drug use   -Social work consult noted and appreciated

## 2017-06-06 NOTE — PROGRESS NOTE ADULT - SUBJECTIVE AND OBJECTIVE BOX
INTERVAL HPI/OVERNIGHT EVENTS:  Patient is a 21y Male Admitted with chief complaint of abdominal pain & vomiting (2017 00:35)    Patient seen and examined at bedside, No acute overnight events.  Patient is calmer and less agitated this morning.  Yesterday patient was crying 2/2 pain and anxiety, this has resolved today;     Patient stated that the abdominal pain and vomiting have improved, yesterday pain was a 10/10, now it is a 7/10;    Denies any fever, chest pain, SOB, diarrhea, calf pain. last bm was 3 days ago;      ROS: denies fever, chills, chest pain, SOB, diarrhea, calf pain.    Allergies    No Known Allergies      Vital Signs Last 24 Hrs  T(C): 37, Max: 37 ( @ 07:10)  T(F): 98.6, Max: 98.6 ( @ 07:10)  Afebrile  HR: 66 (58 - 67)  BP: 115/67 (109/63 - 120/58)  Normotensive  RR: 20 (19 - 20)  SpO2: 98% (96% - 98%)    Physical Exam:   GENERAL: laying in bed, in distress 2/2 abdominal pain;     HEAD:  Atraumatic, Normocephalic  EYES: EOMI, 2mm b/l; PERRLA, conjunctiva and sclera clear  ENMT: No tonsillar erythema, exudates, or enlargement; Moist mucous membranes  NECK: Supple, No JVD, Normal thyroid  NERVOUS SYSTEM: A&Ox3, patient was non-cooperative with exam  CHEST/LUNG: CTAB; No rales, rhonchi, wheezing, or rubs  HEART: S1, S2, regular rate, irregular rhythm; No murmurs, rubs, or gallops  ABDOMEN: Soft, Nondistended; Bowel sounds present, tenderness to palpation in the epigastrium, + guarding; no rebound;     EXTREMITIES:  2+ Peripheral Pulses, No clubbing, cyanosis, or edema, normal range of motion  LYMPH: No lymphadenopathy noted  SKIN: multiple flat erythematous skin lesions (right knee,right posterior thigh, left medial thigh);  TTP in rash on right posterior thigh;  warm to touch;  spares palms/soles/face;      I & Os for current day (as of  @ 06:56)  =============================================  IN: 120 ml / OUT: 0 ml / NET: 120 ml      Labs:                           12.6   15.8  )-----------( 206      ( 2017 18:09 )             36.6       06-04    137  |  101  |  12.0  ----------------------------<  107  3.3<L>   |  26.0  |  0.52    Ca    8.6      2017 13:48  Phos  3.5     06-04  Mg     1.9     06-04    TPro  6.3<L>  /  Alb  4.3  /  TBili  0.6  /  DBili  x   /  AST  70<H>  /  ALT  13  /  AlkPhos  97  06-04              Urinalysis Basic - ( 2017 11:53 )    Color: Pale Yellow / Appearance: Clear / S.015 / pH: x  Gluc: x / Ketone: Trace  / Bili: Negative / Urobili: Negative mg/dL   Blood: x / Protein: Negative mg/dL / Nitrite: Negative   Leuk Esterase: Negative / RBC: x / WBC x   Sq Epi: x / Non Sq Epi: x / Bacteria: x            Lactate Trend      CARDIAC MARKERS ( 2017 13:48 )  x     / x     / 2238 U/L / x     / 35.4 ng/mL  CARDIAC MARKERS ( 2017 09:38 )  x     / <0.01 ng/mL / x     / x     / x      CARDIAC MARKERS ( 2017 01:05 )  x     / x     / 1800 U/L / x     / 47.3 ng/mL        CAPILLARY BLOOD GLUCOSE  105 (2017 07:06)                             14.9   25.2  )-----------( 230      ( 2017 20:29 )             42.4   06-03    139  |  98  |  14.0  ----------------------------<  131<H>  4.4   |  20.0<L>  |  0.59    Ca    10.2      2017 16:00  Phos  3.5     06-04  Mg     1.9     06-04    TPro  8.4  /  Alb  5.2  /  TBili  0.7  /  DBili  x   /  AST  52<H>  /  ALT  14  /  AlkPhos  134<H>  06-03    Venous lactate: 2.4        MEDICATIONS  (STANDING):  pantoprazole  Injectable 40milliGRAM(s) IV Push every 12 hours  enoxaparin Injectable 40milliGRAM(s) SubCutaneous daily  ceFAZolin   IVPB  IV Intermittent   ceFAZolin   IVPB 1000milliGRAM(s) IV Intermittent every 8 hours  thiamine 100milliGRAM(s) Oral daily  dextrose 5% + sodium chloride 0.9%. 1000milliLiter(s) IV Continuous <Continuous>    MEDICATIONS  (PRN):  ondansetron Injectable 4milliGRAM(s) IV Push every 6 hours PRN Nausea and/or Vomiting  HYDROmorphone  Injectable 0.5milliGRAM(s) IV Push every 3 hours PRN Moderate Pain (4 - 6)  LORazepam   Injectable 1milliGRAM(s) IntraMuscular once PRN Anxiety or agitation INTERVAL HPI/OVERNIGHT EVENTS:  Patient is a 21y Male Admitted with chief complaint of abdominal pain & vomiting (2017 00:35)    Patient seen and examined at bedside, No acute overnight events.  Patient is calmer and less agitated this morning.  Yesterday patient was crying 2/2 pain and anxiety, this has resolved today;     Patient stated that the abdominal pain is at the same level as yesterday 7/10;  patient had 1 episode of vomiting today, yellow at 5am;  scrotal pain & swelling, with drainage when squeezed overnight. scrotal pain 6/10, last 3 weeks, comes and goes;    NPO after midnight;       Denies any fever, chest pain, SOB, diarrhea, calf pain. last bm was 3 days ago;      ROS: denies fever, chills, chest pain, SOB, diarrhea, calf pain.    Allergies    No Known Allergies      Vital Signs Last 24 Hrs  T(C): 37, Max: 37 (06-05 @ 07:10)  T(F): 98.6, Max: 98.6 (06-05 @ 07:10)  Afebrile  HR: 66 (58 - 67)  BP: 115/67 (109/63 - 120/58)  Normotensive  RR: 20 (19 - 20)  SpO2: 98% (96% - 98%)    Physical Exam:   GENERAL: laying in bed, in distress 2/2 abdominal pain;     HEAD:  Atraumatic, Normocephalic  EYES: EOMI, 2mm b/l; PERRLA, conjunctiva and sclera clear  ENMT: No tonsillar erythema, exudates, or enlargement; Moist mucous membranes  NECK: Supple, No JVD, Normal thyroid  NERVOUS SYSTEM: A&Ox3, patient was non-cooperative with exam  CHEST/LUNG: CTAB; No rales, rhonchi, wheezing, or rubs  HEART: S1, S2, regular rate, irregular rhythm; No murmurs, rubs, or gallops  ABDOMEN: Soft, Nondistended; Bowel sounds present, tenderness to palpation in the epigastrium, + guarding; no rebound;     :  TTP in right testes, right scrotum;  right inguinal lymphadenoopathy;  Right Scrotal vesicle .5x.5cm, no open punctum, mildly swollen, erythematous;  no draining;    EXTREMITIES:  2+ Peripheral Pulses, No clubbing, cyanosis, or edema, normal range of motion  LYMPH: No lymphadenopathy noted  SKIN: multiple flat erythematous skin lesions (right knee,right posterior thigh, left medial thigh);  TTP in rash on right posterior thigh;  warm to touch;  spares palms/soles/face;        I & Os for current day (as of  @ 06:56)  =============================================  IN: 120 ml / OUT: 0 ml / NET: 120 ml      Labs:                           12.6   15.8  )-----------( 206      ( 2017 18:09 )             36.6       06-    137  |  101  |  12.0  ----------------------------<  107  3.3<L>   |  26.0  |  0.52    Ca    8.6      2017 13:48  Phos  3.5     -  Mg     1.9         TPro  6.3<L>  /  Alb  4.3  /  TBili  0.6  /  DBili  x   /  AST  70<H>  /  ALT  13  /  AlkPhos  97  06-04              Urinalysis Basic - ( 2017 11:53 )    Color: Pale Yellow / Appearance: Clear / S.015 / pH: x  Gluc: x / Ketone: Trace  / Bili: Negative / Urobili: Negative mg/dL   Blood: x / Protein: Negative mg/dL / Nitrite: Negative   Leuk Esterase: Negative / RBC: x / WBC x   Sq Epi: x / Non Sq Epi: x / Bacteria: x            Lactate Trend      CARDIAC MARKERS ( 2017 13:48 )  x     / x     / 2238 U/L / x     / 35.4 ng/mL  CARDIAC MARKERS ( 2017 09:38 )  x     / <0.01 ng/mL / x     / x     / x      CARDIAC MARKERS ( 2017 01:05 )  x     / x     / 1800 U/L / x     / 47.3 ng/mL        CAPILLARY BLOOD GLUCOSE  105 (2017 07:06)                             14.9   25.2  )-----------( 230      ( 2017 20:29 )             42.4   06    139  |  98  |  14.0  ----------------------------<  131<H>  4.4   |  20.0<L>  |  0.59    Ca    10.2      2017 16:00  Phos  3.5     06-04  Mg     1.9     06-04    TPro  8.4  /  Alb  5.2  /  TBili  0.7  /  DBili  x   /  AST  52<H>  /  ALT  14  /  AlkPhos  134<H>  06-03    Venous lactate: 2.4        MEDICATIONS  (STANDING):  pantoprazole  Injectable 40milliGRAM(s) IV Push every 12 hours  enoxaparin Injectable 40milliGRAM(s) SubCutaneous daily  ceFAZolin   IVPB  IV Intermittent   ceFAZolin   IVPB 1000milliGRAM(s) IV Intermittent every 8 hours  thiamine 100milliGRAM(s) Oral daily  dextrose 5% + sodium chloride 0.9%. 1000milliLiter(s) IV Continuous <Continuous>    MEDICATIONS  (PRN):  ondansetron Injectable 4milliGRAM(s) IV Push every 6 hours PRN Nausea and/or Vomiting  HYDROmorphone  Injectable 0.5milliGRAM(s) IV Push every 3 hours PRN Moderate Pain (4 - 6)  LORazepam   Injectable 1milliGRAM(s) IntraMuscular once PRN Anxiety or agitation INTERVAL HPI/OVERNIGHT EVENTS:  Patient is a 21y Male Admitted with chief complaint of abdominal pain & vomiting (2017 00:35)    Patient seen and examined at bedside, No acute overnight events.  Patient is calmer and less agitated this morning.  Yesterday patient was crying 2/2 pain and anxiety, this has resolved today;     Patient stated that the abdominal pain is at the same level as yesterday 7/10;  patient had 1 episode of vomiting today, yellow at 5am;  scrotal pain & swelling, with drainage when squeezed overnight. scrotal pain 6/10, last 3 weeks, comes and goes;    NPO after midnight;       Dark stool overnight, black, small amount, painful, first time this has happened;    Denies any fever, chest pain, SOB, diarrhea, calf pain.;      ROS: denies fever, chills, chest pain, SOB, diarrhea, calf pain.    Allergies    No Known Allergies      Vital Signs Last 24 Hrs  T(C): 37, Max: 37 ( @ 07:10)  T(F): 98.6, Max: 98.6 ( @ 07:10)  Afebrile  HR: 66 (58 - 67)  BP: 115/67 (109/63 - 120/58)  Normotensive  RR: 20 (19 - 20)  SpO2: 98% (96% - 98%)    Physical Exam:   GENERAL: laying in bed, in distress 2/2 abdominal pain;     HEAD:  Atraumatic, Normocephalic  EYES: EOMI, 2mm b/l; PERRLA, conjunctiva and sclera clear  ENMT: No tonsillar erythema, exudates, or enlargement; Moist mucous membranes  NECK: Supple, No JVD, Normal thyroid  NERVOUS SYSTEM: A&Ox3, patient was non-cooperative with exam  CHEST/LUNG: CTAB; No rales, rhonchi, wheezing, or rubs  HEART: S1, S2, regular rate, irregular rhythm; No murmurs, rubs, or gallops  ABDOMEN: Soft, Nondistended; Bowel sounds present, tenderness to palpation in the epigastrium, + guarding; no rebound;     :  TTP in right testes, right scrotum;  right inguinal lymphadenoopathy;  Right Scrotal vesicle .5x.5cm, no open punctum, mildly swollen, erythematous;  no draining;    EXTREMITIES:  2+ Peripheral Pulses, No clubbing, cyanosis, or edema, normal range of motion  LYMPH: No lymphadenopathy noted  SKIN: multiple flat erythematous skin lesions (right knee,right posterior thigh, left medial thigh);  TTP in rash on right posterior thigh;  warm to touch;  spares palms/soles/face;        I & Os for current day (as of  @ 06:56)  =============================================  IN: 120 ml / OUT: 0 ml / NET: 120 ml      Labs:                           12.6   15.8  )-----------( 206      ( 2017 18:09 )             36.6           137  |  101  |  12.0  ----------------------------<  107  3.3<L>   |  26.0  |  0.52    Ca    8.6      2017 13:48  Phos  3.5     -  Mg     1.9         TPro  6.3<L>  /  Alb  4.3  /  TBili  0.6  /  DBili  x   /  AST  70<H>  /  ALT  13  /  AlkPhos  97  -              Urinalysis Basic - ( 2017 11:53 )    Color: Pale Yellow / Appearance: Clear / S.015 / pH: x  Gluc: x / Ketone: Trace  / Bili: Negative / Urobili: Negative mg/dL   Blood: x / Protein: Negative mg/dL / Nitrite: Negative   Leuk Esterase: Negative / RBC: x / WBC x   Sq Epi: x / Non Sq Epi: x / Bacteria: x            Lactate Trend      CARDIAC MARKERS ( 2017 13:48 )  x     / x     / 2238 U/L / x     / 35.4 ng/mL  CARDIAC MARKERS ( 2017 09:38 )  x     / <0.01 ng/mL / x     / x     / x      CARDIAC MARKERS ( 2017 01:05 )  x     / x     / 1800 U/L / x     / 47.3 ng/mL        CAPILLARY BLOOD GLUCOSE  105 (2017 07:06)                             14.9   25.2  )-----------( 230      ( 2017 20:29 )             42.4   06-03    139  |  98  |  14.0  ----------------------------<  131<H>  4.4   |  20.0<L>  |  0.59    Ca    10.2      2017 16:00  Phos  3.5     06-04  Mg     1.9     06-04    TPro  8.4  /  Alb  5.2  /  TBili  0.7  /  DBili  x   /  AST  52<H>  /  ALT  14  /  AlkPhos  134<H>  06-03    Venous lactate: 2.4        MEDICATIONS  (STANDING):  pantoprazole  Injectable 40milliGRAM(s) IV Push every 12 hours  enoxaparin Injectable 40milliGRAM(s) SubCutaneous daily  ceFAZolin   IVPB  IV Intermittent   ceFAZolin   IVPB 1000milliGRAM(s) IV Intermittent every 8 hours  thiamine 100milliGRAM(s) Oral daily  dextrose 5% + sodium chloride 0.9%. 1000milliLiter(s) IV Continuous <Continuous>    MEDICATIONS  (PRN):  ondansetron Injectable 4milliGRAM(s) IV Push every 6 hours PRN Nausea and/or Vomiting  HYDROmorphone  Injectable 0.5milliGRAM(s) IV Push every 3 hours PRN Moderate Pain (4 - 6)  LORazepam   Injectable 1milliGRAM(s) IntraMuscular once PRN Anxiety or agitation

## 2017-06-06 NOTE — PROGRESS NOTE ADULT - PROBLEM SELECTOR PLAN 3
right knee, left thigh anterolateral and medial aspects as well as the right thigh laterally and on the posterior aspect non blanching palpable purpuric patches no evidence of cellulitis and cefazolin was discontinued.  Dermatlogy consult noted and appreciated and looks like resolving fixed drug eruption or a lichenoid dermatitis of unknown etiology that requires no tx at this time also feels these specific skin findings do not correspond to Acute intermittent porphyria. right knee, left thigh anterolateral and medial aspects as well as the right thigh laterally and on the posterior aspect non blanching palpable purpuric patches no evidence of cellulitis and cefazolin was discontinued.  Dermatlogy consult noted and appreciated and looks like resolving fixed drug eruption or a lichenoid dermatitis of unknown etiology that requires no tx at this time also feels these specific skin findings do not correspond to Acute intermittent porphyria.  -Pt to f/u derm as an outpt

## 2017-06-06 NOTE — PROGRESS NOTE ADULT - PROBLEM SELECTOR PLAN 2
-CK trending down;    Initially when patient was admitted he was under the influence of marijuana and was aggressive/ shaking, unclear etiology   -c/w trending ck levels  -c/w IVF   -ck levels trending down; today with 1249 Initially when patient was admitted he was under the influence of marijuana and was aggressive/ shaking, unclear etiology   -c/w trending ck levels  -CK trending down;  580 today

## 2017-06-06 NOTE — BRIEF OPERATIVE NOTE - OPERATION/FINDINGS
Esophagus: distal erythema; bx @ 40 cm r/o quevedo's  Cardia normal; Fundus zane;; Body erythema bx @65 cm request h pylori monoclonal antibody; Antrum:  erythema bx @80 cm request h pylori monoclonal antibody; pylori normal; d bulb normal; D-2 normal

## 2017-06-06 NOTE — PROGRESS NOTE ADULT - SUBJECTIVE AND OBJECTIVE BOX
Pt seen and examined   cc Continued abdominal pain.  Multiple complaints.  passing flatus; no distention.  Reji liquids    REVIEW OF SYSTEMS:  Constitutional: No fever, weight loss or fatigue  Cardiovascular: No chest pain, palpitations, dizziness or leg swelling  Pulm:  No cough, sob, wheeze  Skin: No itching, burning, rashes or lesions   GI  diffuse pain, nausea.        MEDICATIONS:  MEDICATIONS  (STANDING):  pantoprazole  Injectable 40milliGRAM(s) IV Push every 12 hours  enoxaparin Injectable 40milliGRAM(s) SubCutaneous daily  thiamine 100milliGRAM(s) Oral daily  dextrose 5% + sodium chloride 0.9%. 1000milliLiter(s) IV Continuous <Continuous>    MEDICATIONS  (PRN):  ondansetron Injectable 4milliGRAM(s) IV Push every 6 hours PRN Nausea and/or Vomiting  HYDROmorphone  Injectable 2milliGRAM(s) IV Push every 3 hours PRN Severe Pain (7 - 10)  HYDROmorphone  Injectable 1milliGRAM(s) IV Push every 3 hours PRN Moderate Pain (4 - 6)      Allergies    No Known Allergies    Intolerances        Vital Signs Last 24 Hrs  T(C): 37, Max: 37 ( @ 23:21)  T(F): 98.6, Max: 98.6 ( @ 23:21)  HR: 66 (58 - 66)  BP: 115/67 (109/63 - 115/67)  BP(mean): --  RR: 20 (19 - 20)  SpO2: 98% (96% - 98%)    I & Os for current day (as of  @ 07:17)  =============================================  IN: 1000 ml / OUT: 2000 ml / NET: -1000 ml      PHYSICAL EXAM:    General: Well developed; well nourished; in no acute distress  HEENT: Normocephalic; Anicteric, eom intact, throat clear   HEART nsr; no mrg  LUNGS:  clear to ippa  Gastrointestinal: Abd soft; bs wnl.  No masses, organomegaly or tenderness  Skin: Warm and dry. No obvious rash  EXT neg without edema    LABS:      CBC Full  -  ( 2017 07:58 )  WBC Count : 9.6 K/uL  Hemoglobin : 13.4 g/dL  Hematocrit : 39.2 %  Platelet Count - Automated : 203 K/uL  Mean Cell Volume : 91.4 fl  Mean Cell Hemoglobin : 31.2 pg  Mean Cell Hemoglobin Concentration : 34.2 g/dL  Auto Neutrophil # : 6.6 K/uL  Auto Lymphocyte # : 2.3 K/uL  Auto Monocyte # : 0.7 K/uL  Auto Eosinophil # : 0.0 K/uL  Auto Basophil # : x  Auto Neutrophil % : 68.7 %  Auto Lymphocyte % : 24.0 %  Auto Monocyte % : 7.0 %  Auto Eosinophil % : 0.1 %  Auto Basophil % : x    06-05    137  |  102  |  7.0<L>  ----------------------------<  97  3.8   |  26.0  |  0.54    Ca    8.6      2017 07:58  Phos  2.3     06-05  Mg     1.8     06-05    TPro  6.4<L>  /  Alb  4.2  /  TBili  0.6  /  DBili  x   /  AST  54<H>  /  ALT  14  /  AlkPhos  95  06-05          Urinalysis Basic - ( 2017 11:53 )    Color: Pale Yellow / Appearance: Clear / S.015 / pH: x  Gluc: x / Ketone: Trace  / Bili: Negative / Urobili: Negative mg/dL   Blood: x / Protein: Negative mg/dL / Nitrite: Negative   Leuk Esterase: Negative / RBC: x / WBC x   Sq Epi: x / Non Sq Epi: x / Bacteria: x                RADIOLOGY & ADDITIONAL STUDIES (The following images were personally reviewed):

## 2017-06-06 NOTE — PROGRESS NOTE ADULT - ASSESSMENT
21 year old male smoker w/ PMH of gastritis & substance abuse (active marijuana use) s/p cholecystectomy, prior multiple hospitalizations for abd pain/N/V and completed GI work up with negative colonoscopy 1 yr ago currently brought to ED  for acute onset of abdominal pain and admitted for intractable abdominal pain/N/V. Patient with leukocytosis, elevated lactate and starvation ketosis on labs which is likely secondary to intractable vomiting. Also noted to have rhabdomyolysis. Differential includes Cannibis hyperemesis syndrome vs acute intermittent porphyria. 21 year old male smoker w/ PMH of gastritis & substance abuse (active marijuana use) s/p cholecystectomy, prior multiple hospitalizations for abd pain/N/V and completed GI work up with negative colonoscopy 1 yr ago currently brought to ED  for acute onset of abdominal pain and admitted for intractable abdominal pain/N/V. Patient with leukocytosis, elevated lactate and starvation ketosis on labs which is likely secondary to intractable vomiting. Also noted to have rhabdomyolysis. Differential includes Cannibis hyperemesis syndrome vs acute intermittent porphyria. GI consulted and pt for EGD today.

## 2017-06-07 VITALS
TEMPERATURE: 99 F | DIASTOLIC BLOOD PRESSURE: 72 MMHG | SYSTOLIC BLOOD PRESSURE: 122 MMHG | HEART RATE: 69 BPM | RESPIRATION RATE: 18 BRPM | OXYGEN SATURATION: 96 %

## 2017-06-07 LAB
C TRACH RRNA SPEC QL NAA+PROBE: SIGNIFICANT CHANGE UP
CK MB CFR SERPL CALC: 3.4 NG/ML — SIGNIFICANT CHANGE UP (ref 0–6.7)
CK SERPL-CCNC: 292 U/L — HIGH (ref 30–200)
N GONORRHOEA RRNA SPEC QL NAA+PROBE: SIGNIFICANT CHANGE UP
SPECIMEN SOURCE: SIGNIFICANT CHANGE UP

## 2017-06-07 PROCEDURE — 99239 HOSP IP/OBS DSCHRG MGMT >30: CPT

## 2017-06-07 PROCEDURE — 99232 SBSQ HOSP IP/OBS MODERATE 35: CPT

## 2017-06-07 PROCEDURE — 93010 ELECTROCARDIOGRAM REPORT: CPT

## 2017-06-07 RX ORDER — CEFTRIAXONE 500 MG/1
250 INJECTION, POWDER, FOR SOLUTION INTRAMUSCULAR; INTRAVENOUS ONCE
Qty: 0 | Refills: 0 | Status: DISCONTINUED | OUTPATIENT
Start: 2017-06-07 | End: 2017-06-07

## 2017-06-07 RX ORDER — SACCHAROMYCES BOULARDII 250 MG
1 POWDER IN PACKET (EA) ORAL
Qty: 20 | Refills: 0
Start: 2017-06-07 | End: 2017-06-17

## 2017-06-07 RX ORDER — SUCRALFATE 1 G
1 TABLET ORAL
Qty: 42 | Refills: 0
Start: 2017-06-07 | End: 2017-06-21

## 2017-06-07 RX ORDER — SACCHAROMYCES BOULARDII 250 MG
1 POWDER IN PACKET (EA) ORAL
Qty: 10 | Refills: 0 | OUTPATIENT
Start: 2017-06-07 | End: 2017-06-17

## 2017-06-07 RX ORDER — LEVOFLOXACIN 5 MG/ML
1 INJECTION, SOLUTION INTRAVENOUS
Qty: 10 | Refills: 0
Start: 2017-06-07 | End: 2017-06-17

## 2017-06-07 RX ORDER — ACETAMINOPHEN 500 MG
1 TABLET ORAL
Qty: 40 | Refills: 0 | OUTPATIENT
Start: 2017-06-07 | End: 2017-06-17

## 2017-06-07 RX ORDER — HYDROMORPHONE HYDROCHLORIDE 2 MG/ML
0.5 INJECTION INTRAMUSCULAR; INTRAVENOUS; SUBCUTANEOUS ONCE
Qty: 0 | Refills: 0 | Status: DISCONTINUED | OUTPATIENT
Start: 2017-06-07 | End: 2017-06-07

## 2017-06-07 RX ORDER — OXYCODONE AND ACETAMINOPHEN 5; 325 MG/1; MG/1
1 TABLET ORAL
Qty: 8 | Refills: 0
Start: 2017-06-07 | End: 2017-06-09

## 2017-06-07 RX ORDER — ACETAMINOPHEN 500 MG
650 TABLET ORAL EVERY 6 HOURS
Qty: 0 | Refills: 0 | Status: DISCONTINUED | OUTPATIENT
Start: 2017-06-07 | End: 2017-06-07

## 2017-06-07 RX ORDER — HYDROMORPHONE HYDROCHLORIDE 2 MG/ML
1 INJECTION INTRAMUSCULAR; INTRAVENOUS; SUBCUTANEOUS
Qty: 9 | Refills: 0 | OUTPATIENT
Start: 2017-06-07 | End: 2017-06-10

## 2017-06-07 RX ORDER — PANTOPRAZOLE SODIUM 20 MG/1
1 TABLET, DELAYED RELEASE ORAL
Qty: 60 | Refills: 0 | OUTPATIENT
Start: 2017-06-07 | End: 2017-07-07

## 2017-06-07 RX ORDER — HYDROMORPHONE HYDROCHLORIDE 2 MG/ML
1 INJECTION INTRAMUSCULAR; INTRAVENOUS; SUBCUTANEOUS EVERY 6 HOURS
Qty: 0 | Refills: 0 | Status: DISCONTINUED | OUTPATIENT
Start: 2017-06-07 | End: 2017-06-07

## 2017-06-07 RX ORDER — PANTOPRAZOLE SODIUM 20 MG/1
1 TABLET, DELAYED RELEASE ORAL
Qty: 60 | Refills: 0
Start: 2017-06-07 | End: 2017-07-07

## 2017-06-07 RX ORDER — SUCRALFATE 1 G
10 TABLET ORAL
Qty: 560 | Refills: 0 | OUTPATIENT
Start: 2017-06-07 | End: 2017-06-21

## 2017-06-07 RX ADMIN — HYDROMORPHONE HYDROCHLORIDE 2 MILLIGRAM(S): 2 INJECTION INTRAMUSCULAR; INTRAVENOUS; SUBCUTANEOUS at 12:03

## 2017-06-07 RX ADMIN — HYDROMORPHONE HYDROCHLORIDE 2 MILLIGRAM(S): 2 INJECTION INTRAMUSCULAR; INTRAVENOUS; SUBCUTANEOUS at 02:43

## 2017-06-07 RX ADMIN — HYDROMORPHONE HYDROCHLORIDE 0.5 MILLIGRAM(S): 2 INJECTION INTRAMUSCULAR; INTRAVENOUS; SUBCUTANEOUS at 21:24

## 2017-06-07 RX ADMIN — ONDANSETRON 4 MILLIGRAM(S): 8 TABLET, FILM COATED ORAL at 14:56

## 2017-06-07 RX ADMIN — ENOXAPARIN SODIUM 40 MILLIGRAM(S): 100 INJECTION SUBCUTANEOUS at 11:47

## 2017-06-07 RX ADMIN — HYDROMORPHONE HYDROCHLORIDE 2 MILLIGRAM(S): 2 INJECTION INTRAMUSCULAR; INTRAVENOUS; SUBCUTANEOUS at 11:48

## 2017-06-07 RX ADMIN — HYDROMORPHONE HYDROCHLORIDE 0.5 MILLIGRAM(S): 2 INJECTION INTRAMUSCULAR; INTRAVENOUS; SUBCUTANEOUS at 21:09

## 2017-06-07 RX ADMIN — PANTOPRAZOLE SODIUM 40 MILLIGRAM(S): 20 TABLET, DELAYED RELEASE ORAL at 11:47

## 2017-06-07 RX ADMIN — HYDROMORPHONE HYDROCHLORIDE 2 MILLIGRAM(S): 2 INJECTION INTRAMUSCULAR; INTRAVENOUS; SUBCUTANEOUS at 14:56

## 2017-06-07 RX ADMIN — ONDANSETRON 4 MILLIGRAM(S): 8 TABLET, FILM COATED ORAL at 08:42

## 2017-06-07 RX ADMIN — HYDROMORPHONE HYDROCHLORIDE 2 MILLIGRAM(S): 2 INJECTION INTRAMUSCULAR; INTRAVENOUS; SUBCUTANEOUS at 05:29

## 2017-06-07 RX ADMIN — HYDROMORPHONE HYDROCHLORIDE 2 MILLIGRAM(S): 2 INJECTION INTRAMUSCULAR; INTRAVENOUS; SUBCUTANEOUS at 08:41

## 2017-06-07 RX ADMIN — HYDROMORPHONE HYDROCHLORIDE 1 MILLIGRAM(S): 2 INJECTION INTRAMUSCULAR; INTRAVENOUS; SUBCUTANEOUS at 18:06

## 2017-06-07 RX ADMIN — HYDROMORPHONE HYDROCHLORIDE 2 MILLIGRAM(S): 2 INJECTION INTRAMUSCULAR; INTRAVENOUS; SUBCUTANEOUS at 05:44

## 2017-06-07 RX ADMIN — HYDROMORPHONE HYDROCHLORIDE 2 MILLIGRAM(S): 2 INJECTION INTRAMUSCULAR; INTRAVENOUS; SUBCUTANEOUS at 02:28

## 2017-06-07 RX ADMIN — HYDROMORPHONE HYDROCHLORIDE 2 MILLIGRAM(S): 2 INJECTION INTRAMUSCULAR; INTRAVENOUS; SUBCUTANEOUS at 15:10

## 2017-06-07 RX ADMIN — HYDROMORPHONE HYDROCHLORIDE 2 MILLIGRAM(S): 2 INJECTION INTRAMUSCULAR; INTRAVENOUS; SUBCUTANEOUS at 08:51

## 2017-06-07 NOTE — PROGRESS NOTE ADULT - PROBLEM SELECTOR PLAN 4
Rash receding from original marked boundaries;  right knee, left thigh anterolateral and medial aspects as well as the right thigh laterally and on the posterior aspect, flat purpuric patches no evidence of cellulitis and cefazolin was discontinued.  Dermatlogy consult noted and appreciated and looks like resolving fixed drug eruption or a lichenoid dermatitis of unknown etiology that requires no tx at this time also feels these specific skin findings do not correspond to Acute intermittent porphyria.  -Pt to f/u derm as an outpt Rash receding from original marked boundaries;  right knee, left thigh anterolateral and medial aspects as well as the right thigh laterally and on the posterior aspect, flat purpuric patches no evidence of cellulitis and cefazolin was discontinued.  Dermatlogy f/u noted and appreciated and looks like resolving fixed drug eruption or a lichenoid dermatitis of unknown etiology that requires no tx at this time also feels these specific skin findings do not correspond to Acute intermittent porphyria.  -Pt to f/u derm as an outpt for further work up with lyme/ mycoplasma

## 2017-06-07 NOTE — CONSULT NOTE ADULT - SUBJECTIVE AND OBJECTIVE BOX
HPI:  21 year old male smoker w/ PMH of gastritis & substance abuse s/p cholecystectomy brought to ED by his mother for acute onset abdominal pain. Limited history was obtained as patient was a poor historian and constantly demanding more pain medication. Patient stated that his abdominal pain was epigastric, "squeezing", 10/10, constant, non-radiating, alleviated w/ dilaudid, no exacerbating factors, associated w/ 5-6 episodes of nausea & bilious vomiting. Patient admits to smoking marijuana and claims that the time he consumed alcohol was 2-3 weeks ago. Patient was seen in ED on 9/2016 for a similar episode but the workup was negative.     Patient is s/p zofran 4 mg IVP x2, reglan 10 mg IVP x1, pepcid 20 mg IVP x1, toradol 30 mg IVP x1, dilaudid 1 mg IVP x2,  ativan 1 mg IVP x2 & 1L NS in the ED (04 Jun 2017 00:35)      PAST MEDICAL & SURGICAL HISTORY:  Gastritis  No pertinent past medical history  S/P cholecystectomy  No significant past surgical history      REVIEW OF SYSTEMS:    Constitutional: No fever, weight loss or fatigue  Eyes: No eye pain, visual disturbances, or discharge  ENMT:  No difficulty hearing, tinnitus, vertigo; No sinus or throat pain  Respiratory: No cough, wheezing, chills or hemoptysis  Cardiovascular: No chest pain, palpitations, shortness of breath, dizziness or leg swelling  Gastrointestinal: No abdominal or epigastric pain. No nausea, vomiting or hematemesis; No diarrhea or constipation. No melena or hematochezia.  Genitourinary: No dysuria, frequency, hematuria or incontinence  Rectal: No pain, hemorrhoids or incontinence  Neurological: No headaches, memory loss, loss of strength, numbness or tremors  Skin: No itching, burning, rashes or lesions   Lymph Nodes: No enlarged glands  Musculoskeletal: No joint pain or swelling; No muscle, back or extremity pain  Psychiatric: No depression, anxiety, mood swings or difficulty sleeping  Heme/Lymph: No easy bruising or bleeding gums  Allergy and Immunologic: No hives or eczema    MEDICATIONS  (STANDING):  pantoprazole  Injectable 40milliGRAM(s) IV Push every 12 hours  enoxaparin Injectable 40milliGRAM(s) SubCutaneous daily  dextrose 5% + sodium chloride 0.9%. 1000milliLiter(s) IV Continuous <Continuous>    MEDICATIONS  (PRN):  ondansetron Injectable 4milliGRAM(s) IV Push every 6 hours PRN Nausea and/or Vomiting  HYDROmorphone  Injectable 2milliGRAM(s) IV Push every 3 hours PRN Severe Pain (7 - 10)  HYDROmorphone  Injectable 1milliGRAM(s) IV Push every 3 hours PRN Moderate Pain (4 - 6)      Allergies    No Known Allergies    Intolerances        SOCIAL HISTORY:    FAMILY HISTORY:  No pertinent family history in first degree relatives      Vital Signs Last 24 Hrs  T(C): 37.1, Max: 37.1 (06-06 @ 16:26)  T(F): 98.7, Max: 98.8 (06-06 @ 16:26)  HR: 54 (48 - 59)  BP: 99/53 (99/53 - 109/58)  BP(mean): --  RR: 18 (18 - 20)  SpO2: 98% (98% - 98%)    PHYSICAL EXAM:    General: Well developed; well nourished; in no acute distress  Head: Normocephalic; atraumatic  Respiratory: No wheezes, rales or rhonchi  Cardiovascular: Regular rate and rhythm. S1 and S2 Normal; No murmurs, gallops or rubs  Gastrointestinal: Soft non-tender non-distended; Normal bowel sounds; No hepatosplenomegaly  Genitourinary: No costovertebral angle tenderness.  Urinary bladder is clinically not distended. Both testes bilaterally descended. Free of mass. Normal orientation of testes x 2. No torsion or masses. Mild tenderness to right epidiymis.   Extremities: Normal range of motion, No clubbing, cyanosis or edema  Vascular: Peripheral pulses palpable 2+ bilaterally  Neurological: Alert and oriented x4  Skin: Warm and dry. No acute rash  Musculoskeletal: Normal gait, tone, without deformities  Psychiatric: Cooperative and appropriate      LABS:                        14.2   7.3   )-----------( 218      ( 06 Jun 2017 07:16 )             40.9     06-06    140  |  102  |  6.0<L>  ----------------------------<  109  3.6   |  25.0  |  0.51    Ca    9.4      06 Jun 2017 07:16            RADIOLOGY & ADDITIONAL STUDIES:

## 2017-06-07 NOTE — PROGRESS NOTE ADULT - PROBLEM SELECTOR PLAN 3
-Pt c/o scrotal swelling, pain, inguinal lymphadenopathy;  -?Folliculitis vs Vesicle present on scrotum;     -pending herpes, chlamydia, gonorrhea testing;  -scrotal US shows b/l testicular microlithiasis  -Urology consult ordered -Pt c/o scrotal swelling, pain, inguinal lymphadenopathy;  -Folliculitis vs Vesicle present on scrotum;     -pending herpes, chlamydia, gonorrhea testing;  -scrotal US shows b/l testicular microlithiasis  -Urology consult noted and appreciated and dx pt with mild epididymitis and started on oral abx x 10 days total to f/u with urology as an outpt

## 2017-06-07 NOTE — PROGRESS NOTE ADULT - SUBJECTIVE AND OBJECTIVE BOX
INTERVAL HPI/OVERNIGHT EVENTS:  Patient is a 21y Male Admitted with chief complaint of abdominal pain & vomiting (2017 00:35)    Patient seen and examined at bedside, No acute overnight events.  Patient is calmer and less agitated this morning.         Patient stated that the abdominal pain is at the same level as yesterday 7/10;  patient had 1 episode of vomiting today, yellow at 5am;  scrotal pain & swelling, with drainage when squeezed overnight. scrotal pain 6/10, last 3 weeks, comes and goes;    NPO after midnight;       Dark stool overnight, black, small amount, painful, first time this has happened;    Denies any fever, chest pain, SOB, diarrhea, calf pain.;      ROS: denies fever, chills, chest pain, SOB, diarrhea, calf pain.    Allergies    No Known Allergies      Vital Signs Last 24 Hrs  Vital Signs Last 24 Hrs  T(C): 36.9, Max: 37.1 ( @ 16:26)  T(F): 98.5, Max: 98.8 ( @ 16:26)  Afebrile  HR: 48 (48 - 59)  BP: 109/58 (100/56 - 113/54)  Normotensive  RR: 18 (18 - 20)  SpO2: 98% (97% - 98%)    Physical Exam:   GENERAL: laying in bed, in distress 2/2 abdominal pain;     HEAD:  Atraumatic, Normocephalic  EYES: EOMI, 2mm b/l; PERRLA, conjunctiva and sclera clear  ENMT: No tonsillar erythema, exudates, or enlargement; Moist mucous membranes  NECK: Supple, No JVD, Normal thyroid  NERVOUS SYSTEM: A&Ox3, patient was non-cooperative with exam  CHEST/LUNG: CTAB; No rales, rhonchi, wheezing, or rubs  HEART: S1, S2, regular rate, irregular rhythm; No murmurs, rubs, or gallops  ABDOMEN: Soft, Nondistended; Bowel sounds present, tenderness to palpation in the epigastrium, + guarding; no rebound;     :  TTP in right testes, right scrotum;  right inguinal lymphadenoopathy;  Right Scrotal vesicle .5x.5cm, no open punctum, mildly swollen, erythematous;  no draining;    EXTREMITIES:  2+ Peripheral Pulses, No clubbing, cyanosis, or edema, normal range of motion  LYMPH: No lymphadenopathy noted  SKIN: multiple flat erythematous skin lesions (right knee,right posterior thigh, left medial thigh);  TTP in rash on right posterior thigh;  warm to touch;  spares palms/soles/face;        I & Os for current day (as of  @ 06:56)  =============================================  IN: 120 ml / OUT: 0 ml / NET: 120 ml    MEDICATIONS  (STANDING):  pantoprazole  Injectable 40milliGRAM(s) IV Push every 12 hours  enoxaparin Injectable 40milliGRAM(s) SubCutaneous daily  dextrose 5% + sodium chloride 0.9%. 1000milliLiter(s) IV Continuous <Continuous>    MEDICATIONS  (PRN):  ondansetron Injectable 4milliGRAM(s) IV Push every 6 hours PRN Nausea and/or Vomiting  HYDROmorphone  Injectable 2milliGRAM(s) IV Push every 3 hours PRN Severe Pain (7 - 10)  HYDROmorphone  Injectable 1milliGRAM(s) IV Push every 3 hours PRN Moderate Pain (4 - 6)    Labs:                           12.6   15.8  )-----------( 206      ( 2017 18:09 )             36.6       -    137  |  101  |  12.0  ----------------------------<  107  3.3<L>   |  26.0  |  0.52    Ca    8.6      2017 13:48  Phos  3.5     06-  Mg     1.9     -    TPro  6.3<L>  /  Alb  4.3  /  TBili  0.6  /  DBili  x   /  AST  70<H>  /  ALT  13  /  AlkPhos  97  -              Urinalysis Basic - ( 2017 11:53 )    Color: Pale Yellow / Appearance: Clear / S.015 / pH: x  Gluc: x / Ketone: Trace  / Bili: Negative / Urobili: Negative mg/dL   Blood: x / Protein: Negative mg/dL / Nitrite: Negative   Leuk Esterase: Negative / RBC: x / WBC x   Sq Epi: x / Non Sq Epi: x / Bacteria: x            Lactate Trend      CARDIAC MARKERS ( 2017 13:48 )  x     / x     / 2238 U/L / x     / 35.4 ng/mL  CARDIAC MARKERS ( 2017 09:38 )  x     / <0.01 ng/mL / x     / x     / x      CARDIAC MARKERS ( 2017 01:05 )  x     / x     / 1800 U/L / x     / 47.3 ng/mL        CAPILLARY BLOOD GLUCOSE  105 (2017 07:06)                             14.9   25.2  )-----------( 230      ( 2017 20:29 )             42.4   06-03    139  |  98  |  14.0  ----------------------------<  131<H>  4.4   |  20.0<L>  |  0.59    Ca    10.2      2017 16:00  Phos  3.5     06-04  Mg     1.9     06-04    TPro  8.4  /  Alb  5.2  /  TBili  0.7  /  DBili  x   /  AST  52<H>  /  ALT  14  /  AlkPhos  134<H>  06-03    Venous lactate: 2.4            Testicular U/S   17     1.   Right hemiscrotum:  Scattered microlithiasis otherwise   Unremarkable.        2.   Left hemiscrotum: Scattered microlithiasis otherwise   Unremarkable. INTERVAL HPI/OVERNIGHT EVENTS:  Patient is a 21y Male Admitted with chief complaint of abdominal pain & vomiting (2017 00:35)    Patient seen and examined at bedside, No acute overnight events.  Patient is calmer and less agitated this morning.         Patient stated that the abdominal pain is at the same level as yesterday 7/10;  patient had 1 episode of vomiting today, yellow at 5am;  scrotal pain & swelling, with drainage when squeezed overnight. scrotal pain 6/10, last 3 weeks, comes and goes;    NPO after midnight;       Dark stool overnight, black, small amount, painful, first time this has happened;    Denies any fever, chest pain, SOB, diarrhea, calf pain.;      ROS: denies fever, chills, chest pain, SOB, diarrhea, calf pain.    Allergies    No Known Allergies      Vital Signs Last 24 Hrs  T(C): 36.9, Max: 37.1 ( @ 16:26)  T(F): 98.5, Max: 98.8 ( @ 16:26)   Afebrile  HR: 48 (48 - 59)  Bradycardic  BP: 109/58 (100/56 - 113/54)  RR: 18 (18 - 20)  SpO2: 98% (97% - 98%)    Physical Exam:   GENERAL: laying in bed, in distress 2/2 abdominal pain;     HEAD:  Atraumatic, Normocephalic  EYES: EOMI, 2mm b/l; PERRLA, conjunctiva and sclera clear  ENMT: No tonsillar erythema, exudates, or enlargement; Moist mucous membranes  NECK: Supple, No JVD, Normal thyroid  NERVOUS SYSTEM: A&Ox3, patient was non-cooperative with exam  CHEST/LUNG: CTAB; No rales, rhonchi, wheezing, or rubs  HEART: S1, S2, regular rate, irregular rhythm; No murmurs, rubs, or gallops  ABDOMEN: Soft, Nondistended; Bowel sounds present, tenderness to palpation in the epigastrium, + guarding; no rebound;     :  TTP in right testes, right scrotum;  right inguinal lymphadenoopathy;  Right Scrotal vesicle .5x.5cm, no open punctum, mildly swollen, erythematous;  no draining;    EXTREMITIES:  2+ Peripheral Pulses, No clubbing, cyanosis, or edema, normal range of motion  LYMPH: No lymphadenopathy noted  SKIN: multiple flat erythematous skin lesions (right knee,right posterior thigh, left medial thigh);  TTP in rash on right posterior thigh;  warm to touch;  spares palms/soles/face;        I & Os for current day (as of  @ 06:56)  =============================================  IN: 120 ml / OUT: 0 ml / NET: 120 ml    MEDICATIONS  (STANDING):  pantoprazole  Injectable 40milliGRAM(s) IV Push every 12 hours  enoxaparin Injectable 40milliGRAM(s) SubCutaneous daily  dextrose 5% + sodium chloride 0.9%. 1000milliLiter(s) IV Continuous <Continuous>    MEDICATIONS  (PRN):  ondansetron Injectable 4milliGRAM(s) IV Push every 6 hours PRN Nausea and/or Vomiting  HYDROmorphone  Injectable 2milliGRAM(s) IV Push every 3 hours PRN Severe Pain (7 - 10)  HYDROmorphone  Injectable 1milliGRAM(s) IV Push every 3 hours PRN Moderate Pain (4 - 6)    Labs:  CBC Full  -  ( 2017 07:16 )  WBC Count : 7.3 K/uL  Hemoglobin : 14.2 g/dL  Hematocrit : 40.9 %  Platelet Count - Automated : 218 K/uL  Mean Cell Volume : 90.9 fl  Mean Cell Hemoglobin : 31.6 pg  Mean Cell Hemoglobin Concentration : 34.7 g/dL  Auto Neutrophil # : 3.9 K/uL  Auto Lymphocyte # : 2.7 K/uL  Auto Monocyte # : 0.6 K/uL  Auto Eosinophil # : 0.0 K/uL  Auto Basophil # : 0.0 K/uL  Auto Neutrophil % : 53.4 %  Auto Lymphocyte % : 37.1 %  Auto Monocyte % : 8.8 %  Auto Eosinophil % : 0.5 %  Auto Basophil % : 0.1 %    .		Differential:	[] Automated		[] Manual  Chemistry                        14.2   7.3   )-----------( 218      ( 2017 07:16 )             40.9     06-06    140  |  102  |  6.0<L>  ----------------------------<  109  3.6   |  25.0  |  0.51    Ca    9.4      2017 07:16  Phos  2.3     06-05  Mg     1.8     06-05    TPro  6.4<L>  /  Alb  4.2  /  TBili  0.6  /  DBili  x   /  AST  54<H>  /  ALT  14  /  AlkPhos  95  06-05    LIVER FUNCTIONS - ( 2017 07:58 )  Alb: 4.2 g/dL / Pro: 6.4 g/dL / ALK PHOS: 95 U/L / ALT: 14 U/L / AST: 54 U/L / GGT: x                     MICROBIOLOGY/CULTURES:  Culture Results:   No growth ( @ 11:53)  Culture Results:   No growth at 48 hours ( @ 09:38)  Culture Results:   No growth at 48 hours ( @ 09:38)      RADIOLOGY RESULTS:                       12.6   15.8  )-----------( 206      ( 2017 18:09 )             36.6       -04    137  |  101  |  12.0  ----------------------------<  107  3.3<L>   |  26.0  |  0.52    Ca    8.6      2017 13:48  Phos  3.5     06-04  Mg     1.9     06-04    TPro  6.3<L>  /  Alb  4.3  /  TBili  0.6  /  DBili  x   /  AST  70<H>  /  ALT  13  /  AlkPhos  97  06-04              Urinalysis Basic - ( 2017 11:53 )    Color: Pale Yellow / Appearance: Clear / S.015 / pH: x  Gluc: x / Ketone: Trace  / Bili: Negative / Urobili: Negative mg/dL   Blood: x / Protein: Negative mg/dL / Nitrite: Negative   Leuk Esterase: Negative / RBC: x / WBC x   Sq Epi: x / Non Sq Epi: x / Bacteria: x            Lactate Trend      CARDIAC MARKERS ( 2017 13:48 )  x     / x     / 2238 U/L / x     / 35.4 ng/mL  CARDIAC MARKERS ( 2017 09:38 )  x     / <0.01 ng/mL / x     / x     / x      CARDIAC MARKERS ( 2017 01:05 )  x     / x     / 1800 U/L / x     / 47.3 ng/mL        CAPILLARY BLOOD GLUCOSE  105 (2017 07:06)                             14.9   25.2  )-----------( 230      ( 2017 20:29 )             42.4   06-03    139  |  98  |  14.0  ----------------------------<  131<H>  4.4   |  20.0<L>  |  0.59    Ca    10.2      2017 16:00  Phos  3.5     06-04  Mg     1.9     06-04    TPro  8.4  /  Alb  5.2  /  TBili  0.7  /  DBili  x   /  AST  52<H>  /  ALT  14  /  AlkPhos  134<H>  06-03    Venous lactate: 2.4            Testicular U/S   17     1.   Right hemiscrotum:  Scattered microlithiasis otherwise   Unremarkable.        2.   Left hemiscrotum: Scattered microlithiasis otherwise   Unremarkable. INTERVAL HPI/OVERNIGHT EVENTS:  Patient is a 21y Male Admitted with chief complaint of abdominal pain & vomiting (2017 00:35)    Patient seen and examined at bedside, No acute overnight events.  Patient is calmer and less agitated this morning.         Patient stated that the abdominal pain is at the same level as yesterday 7/10;  patient had 1 episode of vomiting today, yellow at 5am;  scrotal pain & swelling, with drainage when squeezed overnight. scrotal pain 6/10, last 3 weeks, comes and goes;    NPO after midnight;       Dark stool overnight, black, small amount, painful, first time this has happened;    Denies any fever, chest pain, SOB, diarrhea, calf pain.;      ROS: denies fever, chills, chest pain, SOB, diarrhea, calf pain.    Allergies    No Known Allergies      Vital Signs Last 24 Hrs  T(C): 36.9, Max: 37.1 ( @ 16:26)  T(F): 98.5, Max: 98.8 ( @ 16:26)   Afebrile  HR: 48 (48 - 59)  Bradycardic  BP: 109/58 (100/56 - 113/54)  RR: 18 (18 - 20)  SpO2: 98% (97% - 98%)    Physical Exam:   GENERAL: laying in bed, in distress 2/2 abdominal pain;     HEAD:  Atraumatic, Normocephalic  EYES: EOMI, 2mm b/l; PERRLA, conjunctiva and sclera clear  ENMT: No tonsillar erythema, exudates, or enlargement; Moist mucous membranes  NECK: Supple, No JVD, Normal thyroid  NERVOUS SYSTEM: A&Ox3, patient was non-cooperative with exam  CHEST/LUNG: CTAB; No rales, rhonchi, wheezing, or rubs  HEART: S1, S2, regular rate, irregular rhythm; No murmurs, rubs, or gallops  ABDOMEN: Soft, Nondistended; Bowel sounds present, tenderness to palpation in the epigastrium mild, no guarding; no rebound;     :  TTP in right testes, right scrotum;  right inguinal lymphadenoopathy;  Right Scrotal vesicle .5x.5cm, no open punctum, mildly swollen, erythematous;  no draining;    EXTREMITIES:  2+ Peripheral Pulses, No clubbing, cyanosis, or edema, normal range of motion  LYMPH: No lymphadenopathy noted  SKIN: multiple flat erythematous skin lesions (right knee,right posterior thigh, left medial thigh);  TTP in rash on right posterior thigh;  warm to touch;  spares palms/soles/face;        I & Os for current day (as of  @ 06:56)  =============================================  IN: 120 ml / OUT: 0 ml / NET: 120 ml    MEDICATIONS  (STANDING):  pantoprazole  Injectable 40milliGRAM(s) IV Push every 12 hours  enoxaparin Injectable 40milliGRAM(s) SubCutaneous daily  dextrose 5% + sodium chloride 0.9%. 1000milliLiter(s) IV Continuous <Continuous>    MEDICATIONS  (PRN):  ondansetron Injectable 4milliGRAM(s) IV Push every 6 hours PRN Nausea and/or Vomiting  HYDROmorphone  Injectable 2milliGRAM(s) IV Push every 3 hours PRN Severe Pain (7 - 10)  HYDROmorphone  Injectable 1milliGRAM(s) IV Push every 3 hours PRN Moderate Pain (4 - 6)    Labs:  CBC Full  -  ( 2017 07:16 )  WBC Count : 7.3 K/uL  Hemoglobin : 14.2 g/dL  Hematocrit : 40.9 %  Platelet Count - Automated : 218 K/uL  Mean Cell Volume : 90.9 fl  Mean Cell Hemoglobin : 31.6 pg  Mean Cell Hemoglobin Concentration : 34.7 g/dL  Auto Neutrophil # : 3.9 K/uL  Auto Lymphocyte # : 2.7 K/uL  Auto Monocyte # : 0.6 K/uL  Auto Eosinophil # : 0.0 K/uL  Auto Basophil # : 0.0 K/uL  Auto Neutrophil % : 53.4 %  Auto Lymphocyte % : 37.1 %  Auto Monocyte % : 8.8 %  Auto Eosinophil % : 0.5 %  Auto Basophil % : 0.1 %    .		Differential:	[] Automated		[] Manual  Chemistry                        14.2   7.3   )-----------( 218      ( 2017 07:16 )             40.9     06-06    140  |  102  |  6.0<L>  ----------------------------<  109  3.6   |  25.0  |  0.51    Ca    9.4      2017 07:16  Phos  2.3     06-05  Mg     1.8     06-05    TPro  6.4<L>  /  Alb  4.2  /  TBili  0.6  /  DBili  x   /  AST  54<H>  /  ALT  14  /  AlkPhos  95  06-05    LIVER FUNCTIONS - ( 2017 07:58 )  Alb: 4.2 g/dL / Pro: 6.4 g/dL / ALK PHOS: 95 U/L / ALT: 14 U/L / AST: 54 U/L / GGT: x                     MICROBIOLOGY/CULTURES:  Culture Results:   No growth ( @ 11:53)  Culture Results:   No growth at 48 hours ( @ 09:38)  Culture Results:   No growth at 48 hours ( @ 09:38)      RADIOLOGY RESULTS:                       12.6   15.8  )-----------( 206      ( 2017 18:09 )             36.6       -04    137  |  101  |  12.0  ----------------------------<  107  3.3<L>   |  26.0  |  0.52    Ca    8.6      2017 13:48  Phos  3.5     06-04  Mg     1.9     06-04    TPro  6.3<L>  /  Alb  4.3  /  TBili  0.6  /  DBili  x   /  AST  70<H>  /  ALT  13  /  AlkPhos  97  06-04              Urinalysis Basic - ( 2017 11:53 )    Color: Pale Yellow / Appearance: Clear / S.015 / pH: x  Gluc: x / Ketone: Trace  / Bili: Negative / Urobili: Negative mg/dL   Blood: x / Protein: Negative mg/dL / Nitrite: Negative   Leuk Esterase: Negative / RBC: x / WBC x   Sq Epi: x / Non Sq Epi: x / Bacteria: x            Lactate Trend      CARDIAC MARKERS ( 2017 13:48 )  x     / x     / 2238 U/L / x     / 35.4 ng/mL  CARDIAC MARKERS ( 2017 09:38 )  x     / <0.01 ng/mL / x     / x     / x      CARDIAC MARKERS ( 2017 01:05 )  x     / x     / 1800 U/L / x     / 47.3 ng/mL        CAPILLARY BLOOD GLUCOSE  105 (2017 07:06)                             14.9   25.2  )-----------( 230      ( 2017 20:29 )             42.4   06-03    139  |  98  |  14.0  ----------------------------<  131<H>  4.4   |  20.0<L>  |  0.59    Ca    10.2      2017 16:00  Phos  3.5     06-04  Mg     1.9     06-04    TPro  8.4  /  Alb  5.2  /  TBili  0.7  /  DBili  x   /  AST  52<H>  /  ALT  14  /  AlkPhos  134<H>  06-03    Venous lactate: 2.4            Testicular U/S   17     1.   Right hemiscrotum:  Scattered microlithiasis otherwise   Unremarkable.        2.   Left hemiscrotum: Scattered microlithiasis otherwise   Unremarkable.

## 2017-06-07 NOTE — PROGRESS NOTE ADULT - ASSESSMENT
21 year old male smoker w/ PMH of gastritis & substance abuse (active marijuana use) s/p cholecystectomy, prior multiple hospitalizations for abd pain/N/V and completed GI work up with negative colonoscopy 1 yr ago currently brought to ED  for acute onset of abdominal pain and admitted for intractable abdominal pain/N/V. Patient with leukocytosis, elevated lactate and starvation ketosis on labs which is likely secondary to intractable vomiting. Also noted to have rhabdomyolysis.    s/p EGD on 6/6/2017, biopsy specimens collected, testing to r/o Guero's Esophagus, H Pylori Monoclonal Antibody;    GI consult /[facility] on board;     Urology consult [facility] for scrotal pain, b/l testicular microlithiasis on US;    Differential includes Cannibis hyperemesis syndrome vs acute intermittent porphyria. GI consulted and pt for EGD today. 21 year old male smoker w/ PMH of gastritis & substance abuse (active marijuana use) s/p cholecystectomy, prior multiple hospitalizations for abd pain/N/V and completed GI work up with negative colonoscopy 1 yr ago currently brought to ED  for acute onset of abdominal pain and admitted for intractable abdominal pain/N/V. Patient with leukocytosis, elevated lactate and starvation ketosis on labs which is likely secondary to intractable vomiting. Also noted to have rhabdomyolysis. Differential includes Cannibis hyperemesis syndrome vs acute intermittent porphyria. GI consulted and pt for s/p EGD on 6/6/2017, biopsy specimens collected, testing to r/o Guero's Esophagus, H Pylori Monoclonal Antibody. 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

## 2017-06-07 NOTE — PROGRESS NOTE ADULT - PROBLEM SELECTOR PLAN 7
-Drug screen came back positive for THC -  -pt was under the influence on admission   -patient counseled on abstinence from drug use   -Social work consult noted and appreciated

## 2017-06-07 NOTE — PROGRESS NOTE ADULT - PROBLEM SELECTOR PLAN 5
protonix 40 mg IV push Q12H  Patient had episode of dark/black stool overnight;  concern for UGI bleed;    attempt stool guaic and fecal occult blood immunology;  EGD biopsy on 6/6/2017, samples being tested for Hpylori and Barett's; protonix 40 mg po pBID   EGD biopsy on 6/6/2017, samples being tested for Hpylori and Barett's; pt to f/u as an outpt for further management and work up

## 2017-06-07 NOTE — PROGRESS NOTE ADULT - SUBJECTIVE AND OBJECTIVE BOX
Patient is a 21y old  Male who presents with a chief complaint of Abdominal pain & vomiting (04 Jun 2017 15:34)      HPI:  21 year old male smoker w/ PMH of gastritis & substance abuse s/p cholecystectomy . Patient's abdominal pain is somewhat better, but feels food sits in esophgus.. Vomited very chito ths am as per family medicine note, but pt denies this to me. NO diarrhea. EGD yeaterday showed mild esophagitis and gastritis. Bx taken to r/o Hp. +Testicular pain. Thought o have epididymitis. on antibiotics    REVIEW OF SYSTEMS:  Constitutional: No fever, weight loss or fatigue  ENMT:  No difficulty hearing, tinnitus, vertigo; No sinus or throat pain  Respiratory: No cough, wheezing, chills or hemoptysis  Cardiovascular: No chest pain, palpitations, dizziness or leg swelling  Gastrointestinal: No abdominal or epigastric pain. + nausea, Skin: No itching, burning, rashes or lesions   Musculoskeletal: No joint pain or swelling; No muscle, back or extremity pain    PAST MEDICAL & SURGICAL HISTORY:  Gastritis  No pertinent past medical history  S/P cholecystectomy        FAMILY HISTORY:  No pertinent family history in first degree relatives      SOCIAL HISTORY:  Smoking Status: [ ] Current, [X ] Former, [ ] Never  Pack Years:  [  ] EtOH  [  ] IVDA- marijuana    MEDICATIONS:  MEDICATIONS  (STANDING):  pantoprazole  Injectable 40milliGRAM(s) IV Push every 12 hours  enoxaparin Injectable 40milliGRAM(s) SubCutaneous daily  dextrose 5% + sodium chloride 0.9%. 1000milliLiter(s) IV Continuous <Continuous>  levoFLOXacin  Tablet 500milliGRAM(s) Oral every 24 hours  cefTRIAXone Injectable 250milliGRAM(s) IntraMuscular once    MEDICATIONS  (PRN):  ondansetron Injectable 4milliGRAM(s) IV Push every 6 hours PRN Nausea and/or Vomiting  HYDROmorphone  Injectable 1milliGRAM(s) IV Push every 6 hours PRN Moderate Pain (4 - 6)  acetaminophen   Tablet. 650milliGRAM(s) Oral every 6 hours PRN Mild Pain (1 - 3)      Allergies    No Known Allergies    Intolerances        Vital Signs Last 24 Hrs  T(C): 37, Max: 37.1 (06-07 @ 07:32)  T(F): 98.6, Max: 98.7 (06-07 @ 07:32)  HR: 57 (48 - 57)  BP: 122/56 (99/53 - 122/56)  BP(mean): --  RR: 18 (18 - 18)  SpO2: 98% (98% - 98%)  I & Os for 24h ending 06-07 @ 07:00  =============================================  IN: 1050 ml / OUT: 500 ml / NET: 550 ml    I & Os for current day (as of 06-07 @ 16:36)  =============================================  IN: 675 ml / OUT: 0 ml / NET: 675 ml        PHYSICAL EXAM:    General: Well developed; well nourished; in no acute distress  HEENT: MMM, conjunctiva and sclera clear  Gastrointestinal: Soft, non-tender non-distended; Normal bowel sounds; No rebound or guarding  Extremities: Normal range of motion, No clubbing, cyanosis or edema  Neurological: Alert and oriented x3  Skin: Warm and dry. No obvious rash      LABS:                        14.2   7.3   )-----------( 218      ( 06 Jun 2017 07:16 )             40.9     06 Jun 2017 07:16    140    |  102    |  6.0    ----------------------------<  109    3.6     |  25.0   |  0.51     Ca    9.4        06 Jun 2017 07:16                RADIOLOGY & ADDITIONAL STUDIES:

## 2017-06-07 NOTE — PROGRESS NOTE ADULT - PROBLEM SELECTOR PROBLEM 1
Intractable abdominal pain
Pain of upper abdomen
Pain of upper abdomen

## 2017-06-07 NOTE — PROGRESS NOTE ADULT - PROVIDER SPECIALTY LIST ADULT
Dermatology
Family Medicine
Gastroenterology
Orthopedics
Family Medicine

## 2017-06-07 NOTE — PROGRESS NOTE ADULT - PROBLEM SELECTOR PLAN 2
Initially when patient was admitted he was under the influence of marijuana and was aggressive/ shaking, unclear etiology    -CK trending down; Resolved   Initially when patient was admitted he was under the influence of marijuana and was aggressive/ shaking, unclear etiology    -CK trending down

## 2017-06-07 NOTE — CONSULT NOTE ADULT - PROBLEM SELECTOR RECOMMENDATION 9
May give protonix,IV bid  zofran IV qid   clear liquid diet  Increased WBC, crp, lactate, CT abdomen normal   Consider acute intermittent porphria. Order spot urine PBG, ALA and total porphoryin. If elevated, then do 24 urine. 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
Mild right epididymitis with no masses in either testis. Scrotal elevation and short course of oral antibiotics.  Microlithiasis is to be followed as out patient.

## 2017-06-07 NOTE — PROGRESS NOTE ADULT - PROBLEM SELECTOR PLAN 1
Pain slowly resolving;   -Differentials include Cannabis Hyperemesis syndrome vs Acute Intermittent Porphyria vs PUD   -abdominal pain slightly improving, responding to current pain regimen   -Patient reports similar sx in his mother who was also never diagnosed and continues to endure the pain.   -c/w d5ns @75cc/hr   -c/w increased dose of dilaudid 2mg IVP Q3H PRN severe pain  -c/w increased dose of dilaudid 1mg IVP Q3H PRN moderate pain  -hot showers did not provide any relief to the patient, which makes the marijuana associated hyperemesis;    -prior colonoscopy one year ago negative and multiple hospitalizations for similar symptoms;  Mother has similar abdominal symptoms;    -f/u urine porphobilinogen/ 24hr ALA urine   -patient reported having one episode of dark stools yesterday; -h/h stable will f/u occult stool Pain slowly resolving;   -Differentials include Cannabis Hyperemesis syndrome vs Acute Intermittent Porphyria vs PUD   -abdominal pain slightly improving, responding to current pain regimen   -Patient reports similar sx in his mother who was also never diagnosed and continues to endure the pain.   -c/w d5ns @75cc/hr   -c/w increased dose of dilaudid 2mg IVP Q3H PRN severe pain  -c/w increased dose of dilaudid 1mg IVP Q3H PRN moderate pain  -hot showers did not provide any relief to the patient, which makes the marijuana associated hyperemesis;    -prior colonoscopy one year ago negative and multiple hospitalizations for similar symptoms;  Mother has similar abdominal symptoms;    -f/u urine porphobilinogen/ 24hr ALA urine   -patient reported having one episode of dark stools yesterday; -h/h stable will f/u occult stool  -bradycardic overnight;  pending ekg; Differentials include Cannabis Hyperemesis syndrome vs Acute Intermittent Porphyria   -Patient reports similar sx in his mother who was also never diagnosed and continues to endure the pain.   -patients dilaudid dose decreased to 1mg IV q8hrs as needed for moderate -severe pain and tylenol for mild pain   -prior colonoscopy one year ago negative and multiple hospitalizations for similar symptoms;  Mother has similar abdominal symptoms;    Improving and tolerating regular diet with minimal discomfort.   -s/p EGD which showed gastritis but bx taken pending results   -f/u urine porphobilinogen/ 24hr ALA urine still pending   -pt medically stable to be discharged home to f/u with GI as an outpatient for further work up and management

## 2017-06-07 NOTE — PROGRESS NOTE ADULT - SUBJECTIVE AND OBJECTIVE BOX
HPI:  Dermatology followup for rash on this patient whom I saw on Monday. The rash remains asymptomatic, but pt is now complaining of "pain" on his right scrotum.      PAST MEDICAL & SURGICAL HISTORY:  Gastritis  No pertinent past medical history  S/P cholecystectomy  No significant past surgical history      REVIEW OF SYSTEMS:    CONSTITUTIONAL: No fever, weight loss, or fatigue  SKIN: No itching, burning, or new rashes, or lesions       MEDICATIONS  (STANDING):  pantoprazole  Injectable 40milliGRAM(s) IV Push every 12 hours  enoxaparin Injectable 40milliGRAM(s) SubCutaneous daily  dextrose 5% + sodium chloride 0.9%. 1000milliLiter(s) IV Continuous <Continuous>    MEDICATIONS  (PRN):  ondansetron Injectable 4milliGRAM(s) IV Push every 6 hours PRN Nausea and/or Vomiting  HYDROmorphone  Injectable 2milliGRAM(s) IV Push every 3 hours PRN Severe Pain (7 - 10)  HYDROmorphone  Injectable 1milliGRAM(s) IV Push every 3 hours PRN Moderate Pain (4 - 6)      Allergies    No Known Allergies    Intolerances        SOCIAL HISTORY:    FAMILY HISTORY:  No pertinent family history in first degree relatives      Vital Signs Last 24 Hrs  T(C): 37.1, Max: 37.1 (06-06 @ 16:26)  T(F): 98.7, Max: 98.8 (06-06 @ 16:26)  HR: 54 (48 - 59)  BP: 99/53 (99/53 - 109/58)  BP(mean): --  RR: 18 (18 - 20)  SpO2: 98% (98% - 98%)    PHYSICAL EXAM:    GENERAL: NAD, well-groomed, well-developed  HEAD:  Atraumatic, Normocephalic  SKIN: resolved, fading minimally erythematous patch on his right knee. All the other lesions have completely resolved and there are no new or active lesions.  GENITAL: he has a rubbery, sq nodule on his right lateral scrotum with suggestion of an overlying punctum with no visible erythema, not warm but tender to touch. No overlying rash.        LABS:                        14.2   7.3   )-----------( 218      ( 06 Jun 2017 07:16 )             40.9     06-06    140  |  102  |  6.0<L>  ----------------------------<  109  3.6   |  25.0  |  0.51    Ca    9.4      06 Jun 2017 07:16          Sedimentation Rate, Erythrocyte: 6 mm/hr (06-04 @ 18:09)    ASSESSMENT/PLAN:  Resolving inflammatory skin reaction ddx would be fixed drug vs lichenoid eruption of unclear etiology. No new eruptions,    I had discussed with Dr. Goff (former resident on case) to have Mycoplasm titers drawn, and I would also consider doing Lyme titers with next blood draw since Lyme disease has atypical presentations.    The scrotal lesion feels like an epidermal inclusion cyst which may need surgical excision in the future. It is tender to touch but not infected or inflamed at this time. Urology followup advised.    Call me back prn.    Thanks,  Flor Arnold MD    .

## 2017-06-08 PROBLEM — Z00.00 ENCOUNTER FOR PREVENTIVE HEALTH EXAMINATION: Noted: 2017-06-08

## 2017-06-08 LAB
AMPHETAMINES BLD QL SCN: NEGATIVE — SIGNIFICANT CHANGE UP
BARBITURATES SERPLBLD QL: NEGATIVE — SIGNIFICANT CHANGE UP
BENZODIAZAPINES, SERUM: NEGATIVE — SIGNIFICANT CHANGE UP
CANNABINOIDS SERPLBLD QL SCN: POSITIVE
CLINICAL BIOCHEMIST REVIEW: SIGNIFICANT CHANGE UP
COCAINE+BZE SERPLBLD QL SCN: NEGATIVE — SIGNIFICANT CHANGE UP
D-ALA UR-MCNC: 5000 ML/24 H — SIGNIFICANT CHANGE UP
D-ALA UR-MCNC: 6 MG/24 H — HIGH
M PNEUMO IGG SER IA-ACNC: 0.49 INDEX — SIGNIFICANT CHANGE UP
M PNEUMO IGG SER IA-ACNC: NEGATIVE — SIGNIFICANT CHANGE UP
M PNEUMO IGM SER-ACNC: 337 UNITS/ML — SIGNIFICANT CHANGE UP
METHADONE SERPL-MCNC: NEGATIVE — SIGNIFICANT CHANGE UP
MYCOPLASMA AG SPEC QL: NEGATIVE — SIGNIFICANT CHANGE UP
OPIATES SERPL QL: NEGATIVE — SIGNIFICANT CHANGE UP
PCP BLD QL SCN: NEGATIVE — SIGNIFICANT CHANGE UP
PORPHYRINS SERPL-MCNC: <1 MCG/DL — SIGNIFICANT CHANGE UP
PTP REVIEWED BY: SIGNIFICANT CHANGE UP

## 2017-06-09 LAB
CULTURE RESULTS: SIGNIFICANT CHANGE UP
CULTURE RESULTS: SIGNIFICANT CHANGE UP
HSV 1+2 IGM SER-IMP: 1.12 RATIO — HIGH (ref 0–0.9)
M PNEUMO IGG SER IA-ACNC: 0.42 INDEX — SIGNIFICANT CHANGE UP
M PNEUMO IGG SER IA-ACNC: NEGATIVE — SIGNIFICANT CHANGE UP
M PNEUMO IGM SER-ACNC: 305 UNITS/ML — SIGNIFICANT CHANGE UP
MYCOPLASMA AG SPEC QL: NEGATIVE — SIGNIFICANT CHANGE UP
SPECIMEN SOURCE: SIGNIFICANT CHANGE UP
SPECIMEN SOURCE: SIGNIFICANT CHANGE UP
SURGICAL PATHOLOGY FINAL REPORT - CH: SIGNIFICANT CHANGE UP

## 2017-06-12 ENCOUNTER — EMERGENCY (EMERGENCY)
Facility: HOSPITAL | Age: 22
LOS: 1 days | Discharge: HOME | End: 2017-06-12
Attending: EMERGENCY MEDICINE
Payer: MEDICAID

## 2017-06-12 VITALS
RESPIRATION RATE: 18 BRPM | DIASTOLIC BLOOD PRESSURE: 83 MMHG | OXYGEN SATURATION: 98 % | SYSTOLIC BLOOD PRESSURE: 116 MMHG | TEMPERATURE: 98 F | HEART RATE: 88 BPM

## 2017-06-12 VITALS
HEART RATE: 81 BPM | SYSTOLIC BLOOD PRESSURE: 106 MMHG | RESPIRATION RATE: 20 BRPM | TEMPERATURE: 99 F | DIASTOLIC BLOOD PRESSURE: 69 MMHG | WEIGHT: 119.93 LBS | OXYGEN SATURATION: 98 % | HEIGHT: 69 IN

## 2017-06-12 DIAGNOSIS — S52.209A UNSPECIFIED FRACTURE OF SHAFT OF UNSPECIFIED ULNA, INITIAL ENCOUNTER FOR CLOSED FRACTURE: Chronic | ICD-10-CM

## 2017-06-12 DIAGNOSIS — Z90.49 ACQUIRED ABSENCE OF OTHER SPECIFIED PARTS OF DIGESTIVE TRACT: Chronic | ICD-10-CM

## 2017-06-12 LAB
ALBUMIN SERPL ELPH-MCNC: 5 G/DL — SIGNIFICANT CHANGE UP (ref 3.3–5.2)
ALP SERPL-CCNC: 132 U/L — HIGH (ref 40–120)
ALT FLD-CCNC: 14 U/L — SIGNIFICANT CHANGE UP
ANION GAP SERPL CALC-SCNC: 16 MMOL/L — SIGNIFICANT CHANGE UP (ref 5–17)
AST SERPL-CCNC: 36 U/L — SIGNIFICANT CHANGE UP
B BURGDOR C6 AB SER-ACNC: NEGATIVE — SIGNIFICANT CHANGE UP
B BURGDOR IGG+IGM SER-ACNC: 0.05 INDEX — SIGNIFICANT CHANGE UP (ref 0.01–0.89)
BASOPHILS # BLD AUTO: 0 K/UL — SIGNIFICANT CHANGE UP (ref 0–0.2)
BASOPHILS NFR BLD AUTO: 0.1 % — SIGNIFICANT CHANGE UP (ref 0–2)
BILIRUB SERPL-MCNC: 0.4 MG/DL — SIGNIFICANT CHANGE UP (ref 0.4–2)
BUN SERPL-MCNC: 11 MG/DL — SIGNIFICANT CHANGE UP (ref 8–20)
CALCIUM SERPL-MCNC: 10 MG/DL — SIGNIFICANT CHANGE UP (ref 8.6–10.2)
CHLORIDE SERPL-SCNC: 96 MMOL/L — LOW (ref 98–107)
CO2 SERPL-SCNC: 28 MMOL/L — SIGNIFICANT CHANGE UP (ref 22–29)
CREAT SERPL-MCNC: 0.55 MG/DL — SIGNIFICANT CHANGE UP (ref 0.5–1.3)
EOSINOPHIL # BLD AUTO: 0 K/UL — SIGNIFICANT CHANGE UP (ref 0–0.5)
EOSINOPHIL NFR BLD AUTO: 0.3 % — SIGNIFICANT CHANGE UP (ref 0–5)
GLUCOSE SERPL-MCNC: 104 MG/DL — SIGNIFICANT CHANGE UP (ref 70–115)
HCT VFR BLD CALC: 45.8 % — SIGNIFICANT CHANGE UP (ref 42–52)
HGB BLD-MCNC: 16 G/DL — SIGNIFICANT CHANGE UP (ref 14–18)
LIDOCAIN IGE QN: 13 U/L — LOW (ref 22–51)
LYMPHOCYTES # BLD AUTO: 17.5 % — LOW (ref 20–55)
LYMPHOCYTES # BLD AUTO: 2.8 K/UL — SIGNIFICANT CHANGE UP (ref 1–4.8)
MCHC RBC-ENTMCNC: 32 PG — HIGH (ref 27–31)
MCHC RBC-ENTMCNC: 34.9 G/DL — SIGNIFICANT CHANGE UP (ref 32–36)
MCV RBC AUTO: 91.6 FL — SIGNIFICANT CHANGE UP (ref 80–94)
MONOCYTES # BLD AUTO: 0.8 K/UL — SIGNIFICANT CHANGE UP (ref 0–0.8)
MONOCYTES NFR BLD AUTO: 5 % — SIGNIFICANT CHANGE UP (ref 3–10)
NEUTROPHILS # BLD AUTO: 12.3 K/UL — HIGH (ref 1.8–8)
NEUTROPHILS NFR BLD AUTO: 76.7 % — HIGH (ref 37–73)
PLATELET # BLD AUTO: 283 K/UL — SIGNIFICANT CHANGE UP (ref 150–400)
POTASSIUM SERPL-MCNC: 4.5 MMOL/L — SIGNIFICANT CHANGE UP (ref 3.5–5.3)
POTASSIUM SERPL-SCNC: 4.5 MMOL/L — SIGNIFICANT CHANGE UP (ref 3.5–5.3)
PROT SERPL-MCNC: 8 G/DL — SIGNIFICANT CHANGE UP (ref 6.6–8.7)
RBC # BLD: 5 M/UL — SIGNIFICANT CHANGE UP (ref 4.6–6.2)
RBC # FLD: 14.9 % — SIGNIFICANT CHANGE UP (ref 11–15.6)
SODIUM SERPL-SCNC: 140 MMOL/L — SIGNIFICANT CHANGE UP (ref 135–145)
WBC # BLD: 16.1 K/UL — HIGH (ref 4.8–10.8)
WBC # FLD AUTO: 16.1 K/UL — HIGH (ref 4.8–10.8)

## 2017-06-12 PROCEDURE — 99285 EMERGENCY DEPT VISIT HI MDM: CPT

## 2017-06-12 PROCEDURE — 76870 US EXAM SCROTUM: CPT | Mod: 26

## 2017-06-12 RX ORDER — HYDROMORPHONE HYDROCHLORIDE 2 MG/ML
1 INJECTION INTRAMUSCULAR; INTRAVENOUS; SUBCUTANEOUS ONCE
Qty: 0 | Refills: 0 | Status: DISCONTINUED | OUTPATIENT
Start: 2017-06-12 | End: 2017-06-12

## 2017-06-12 RX ORDER — LIDOCAINE 4 G/100G
10 CREAM TOPICAL ONCE
Qty: 0 | Refills: 0 | Status: COMPLETED | OUTPATIENT
Start: 2017-06-12 | End: 2017-06-12

## 2017-06-12 RX ORDER — SODIUM CHLORIDE 9 MG/ML
3 INJECTION INTRAMUSCULAR; INTRAVENOUS; SUBCUTANEOUS EVERY 8 HOURS
Qty: 0 | Refills: 0 | Status: DISCONTINUED | OUTPATIENT
Start: 2017-06-12 | End: 2017-06-16

## 2017-06-12 RX ORDER — METOCLOPRAMIDE HCL 10 MG
10 TABLET ORAL ONCE
Qty: 0 | Refills: 0 | Status: COMPLETED | OUTPATIENT
Start: 2017-06-12 | End: 2017-06-12

## 2017-06-12 RX ORDER — ONDANSETRON 8 MG/1
4 TABLET, FILM COATED ORAL ONCE
Qty: 0 | Refills: 0 | Status: COMPLETED | OUTPATIENT
Start: 2017-06-12 | End: 2017-06-12

## 2017-06-12 RX ORDER — KETOROLAC TROMETHAMINE 30 MG/ML
15 SYRINGE (ML) INJECTION ONCE
Qty: 0 | Refills: 0 | Status: DISCONTINUED | OUTPATIENT
Start: 2017-06-12 | End: 2017-06-12

## 2017-06-12 RX ADMIN — Medication 30 MILLILITER(S): at 16:57

## 2017-06-12 RX ADMIN — Medication 10 MILLIGRAM(S): at 15:09

## 2017-06-12 RX ADMIN — Medication 15 MILLIGRAM(S): at 15:09

## 2017-06-12 RX ADMIN — HYDROMORPHONE HYDROCHLORIDE 1 MILLIGRAM(S): 2 INJECTION INTRAMUSCULAR; INTRAVENOUS; SUBCUTANEOUS at 15:09

## 2017-06-12 RX ADMIN — HYDROMORPHONE HYDROCHLORIDE 1 MILLIGRAM(S): 2 INJECTION INTRAMUSCULAR; INTRAVENOUS; SUBCUTANEOUS at 15:27

## 2017-06-12 RX ADMIN — SODIUM CHLORIDE 3 MILLILITER(S): 9 INJECTION INTRAMUSCULAR; INTRAVENOUS; SUBCUTANEOUS at 15:09

## 2017-06-12 RX ADMIN — ONDANSETRON 4 MILLIGRAM(S): 8 TABLET, FILM COATED ORAL at 15:09

## 2017-06-12 RX ADMIN — Medication 15 MILLIGRAM(S): at 16:57

## 2017-06-12 RX ADMIN — LIDOCAINE 10 MILLILITER(S): 4 CREAM TOPICAL at 16:57

## 2017-06-12 NOTE — ED ADULT NURSE NOTE - NS ED NURSE DC INFO COMPLEXITY
Verbalized Understanding/Simple: Patient demonstrates quick and easy understanding/Patient asked questions/Returned Demonstration

## 2017-06-12 NOTE — ED PROVIDER NOTE - OBJECTIVE STATEMENT
20 yo male recent hospitalization for abd pain, dx with gastritis, started on PPI, carafate, antibx, and percocet; now ran out of pain meds, still complaining of intermittent bouts of 10/10 abd pain, sharp, stabbing quality,  worse now x 1 day, begins in groin towards scrotum, and then radiates up most severe into epigastric region; patient had CT scans, US, and endoscopy, only found gastritis. Not assoc with fever, nausea, or vomiting. Only surg Hx of cholecystectomy

## 2017-06-12 NOTE — ED ADULT TRIAGE NOTE - CHIEF COMPLAINT QUOTE
Patient arrived to ED today with c/o abdominal pain and vomiting.  Patient was seen for the same recently and endoscopy was preformed.

## 2017-06-12 NOTE — ED ADULT NURSE REASSESSMENT NOTE - NS ED NURSE REASSESS COMMENT FT1
Patient standing on side of stretcher screaming in pain after given pain medications.  Patient found in bathroom, climbing  the walls,  patient took out IV-bathroom floor covered in blood.  New IV placed patient transferred to [facility].

## 2017-06-12 NOTE — ED PROVIDER NOTE - MEDICAL DECISION MAKING DETAILS
Reviewed recent inpatient stay, controlled pain, no acute findings on today's labs or imaging; OK for d/c; advised to f/u GI

## 2017-06-12 NOTE — ED ADULT NURSE NOTE - OBJECTIVE STATEMENT
Patient recd this afternoon A/Ox3, VSS, presents to ED c/o lower abdomen pain and right side groin pain, pain started 3am, was recently here at [facility] from same symptoms.  Patient denies nausea/vomit/diarrghea/chest pain or sob.  Respirations are even and unlabored, lungs cta, +bowel x4 quads, abdomen soft, nontender/nondistended, skin w/d/i.

## 2017-06-12 NOTE — ED STATDOCS - PROGRESS NOTE DETAILS
: Melissa. 20 y/o M pt w/ PMHx of gastritis presents to the ED c/o R testicular pain and epigastric pain x9 days. Pt states that he was admitted to the hospital and had CT done which was negative, and had endoscopy. Pt also notes weight loss (13 lbs). Pt was D/C w/ Oxycodone to mild relief. Pt denies N/V/D, fever, chills, or any other complaints. Pt had GI w/u which did not reveal a lot. Focused evaluation, orders entered, placed in main for further evaluation by another provider.

## 2017-06-12 NOTE — ED ADULT NURSE NOTE - CHPI ED SYMPTOMS NEG
no dizziness/no decreased eating/drinking/no numbness/no nausea/no vomiting/no fever/no chills/no weakness

## 2017-06-19 LAB
COPROPORPHYRIN I - RANDOM URINE: 24.8 — SIGNIFICANT CHANGE UP (ref 5.6–28.6)
COPROPORPHYRIN III - RANDOM URINE: 0.8 — LOW (ref 4.1–76.4)
HEPTA-CP UR-MCNC: 4.3 — HIGH
HEXA-CP UR-MCNC: SIGNIFICANT CHANGE UP
PENTACARBOXYPORPHRIN, RANDOM URINE: 1.1 — SIGNIFICANT CHANGE UP
PORPHYRINS RANDOM URINE INTERPRETATION: SIGNIFICANT CHANGE UP
PORPHYRINS UR-MCNC: 51.3 — SIGNIFICANT CHANGE UP (ref 23.3–132.4)
UROPORPHYRIN I - RANDOM URINE: 16.6 — SIGNIFICANT CHANGE UP (ref 3.1–18.2)
UROPORPHYRIN III - RANDOM URINE: 3.7 — SIGNIFICANT CHANGE UP

## 2017-06-24 ENCOUNTER — EMERGENCY (EMERGENCY)
Facility: HOSPITAL | Age: 22
LOS: 1 days | Discharge: LEFT BEFORE DISCHARGE | End: 2017-06-24
Attending: EMERGENCY MEDICINE

## 2017-06-24 VITALS
SYSTOLIC BLOOD PRESSURE: 137 MMHG | TEMPERATURE: 98 F | DIASTOLIC BLOOD PRESSURE: 83 MMHG | WEIGHT: 134.92 LBS | HEART RATE: 144 BPM | HEIGHT: 66 IN | RESPIRATION RATE: 18 BRPM | OXYGEN SATURATION: 99 %

## 2017-06-24 DIAGNOSIS — Z90.49 ACQUIRED ABSENCE OF OTHER SPECIFIED PARTS OF DIGESTIVE TRACT: Chronic | ICD-10-CM

## 2017-06-24 DIAGNOSIS — S52.209A UNSPECIFIED FRACTURE OF SHAFT OF UNSPECIFIED ULNA, INITIAL ENCOUNTER FOR CLOSED FRACTURE: Chronic | ICD-10-CM

## 2017-06-24 RX ORDER — FENTANYL CITRATE 50 UG/ML
1 INJECTION INTRAVENOUS
Qty: 0 | Refills: 0 | Status: COMPLETED | OUTPATIENT
Start: 2017-06-24 | End: 2017-07-01

## 2017-06-24 RX ORDER — METOCLOPRAMIDE HCL 10 MG
10 TABLET ORAL ONCE
Qty: 0 | Refills: 0 | Status: DISCONTINUED | OUTPATIENT
Start: 2017-06-24 | End: 2017-06-28

## 2017-06-24 RX ORDER — FENTANYL CITRATE 50 UG/ML
1 INJECTION INTRAVENOUS
Qty: 0 | Refills: 0 | Status: DISCONTINUED | OUTPATIENT
Start: 2017-06-24 | End: 2017-06-24

## 2017-06-24 NOTE — ED ADULT NURSE NOTE - NS ED NURSE ELOPE COMMENTS
rn never assessed patient. per dr clark patient left without completing treatment after finding out he would not receive pain medication that he wanted.

## 2017-06-24 NOTE — ED ADULT TRIAGE NOTE - CHIEF COMPLAINT QUOTE
BIBA, patient is awake and oriented times 3, complains of abdominal pain, patient is a poor historian, patient was evaled for the same thing 1 week ago and states that he has a follow up appointment with GI in 9/17

## 2017-07-12 ENCOUNTER — TRANSCRIPTION ENCOUNTER (OUTPATIENT)
Age: 22
End: 2017-07-12

## 2017-08-20 ENCOUNTER — INPATIENT (INPATIENT)
Facility: HOSPITAL | Age: 22
LOS: 1 days | Discharge: LEFT BEFORE DISCHARGE | DRG: 894 | End: 2017-08-22
Attending: FAMILY MEDICINE | Admitting: HOSPITALIST
Payer: COMMERCIAL

## 2017-08-20 VITALS
OXYGEN SATURATION: 98 % | HEART RATE: 75 BPM | SYSTOLIC BLOOD PRESSURE: 122 MMHG | DIASTOLIC BLOOD PRESSURE: 87 MMHG | RESPIRATION RATE: 22 BRPM | TEMPERATURE: 99 F | WEIGHT: 149.91 LBS | HEIGHT: 67 IN

## 2017-08-20 DIAGNOSIS — Z90.49 ACQUIRED ABSENCE OF OTHER SPECIFIED PARTS OF DIGESTIVE TRACT: Chronic | ICD-10-CM

## 2017-08-20 DIAGNOSIS — S52.209A UNSPECIFIED FRACTURE OF SHAFT OF UNSPECIFIED ULNA, INITIAL ENCOUNTER FOR CLOSED FRACTURE: Chronic | ICD-10-CM

## 2017-08-20 DIAGNOSIS — R11.14 BILIOUS VOMITING: ICD-10-CM

## 2017-08-20 LAB
ALBUMIN SERPL ELPH-MCNC: 5.1 G/DL — SIGNIFICANT CHANGE UP (ref 3.3–5.2)
ALP SERPL-CCNC: 148 U/L — HIGH (ref 40–120)
ALT FLD-CCNC: 17 U/L — SIGNIFICANT CHANGE UP
ANION GAP SERPL CALC-SCNC: 27 MMOL/L — HIGH (ref 5–17)
AST SERPL-CCNC: 51 U/L — HIGH
BASOPHILS NFR BLD AUTO: 2 % — SIGNIFICANT CHANGE UP (ref 0–2)
BILIRUB SERPL-MCNC: 0.6 MG/DL — SIGNIFICANT CHANGE UP (ref 0.4–2)
BUN SERPL-MCNC: 16 MG/DL — SIGNIFICANT CHANGE UP (ref 8–20)
CALCIUM SERPL-MCNC: 9.8 MG/DL — SIGNIFICANT CHANGE UP (ref 8.6–10.2)
CHLORIDE SERPL-SCNC: 98 MMOL/L — SIGNIFICANT CHANGE UP (ref 98–107)
CO2 SERPL-SCNC: 16 MMOL/L — LOW (ref 22–29)
CREAT SERPL-MCNC: 0.54 MG/DL — SIGNIFICANT CHANGE UP (ref 0.5–1.3)
EOSINOPHIL # BLD AUTO: 0 K/UL — SIGNIFICANT CHANGE UP (ref 0–0.5)
GLUCOSE SERPL-MCNC: 65 MG/DL — LOW (ref 70–115)
HCT VFR BLD CALC: 41.2 % — LOW (ref 42–52)
HCT VFR BLD CALC: 43.6 % — SIGNIFICANT CHANGE UP (ref 42–52)
HGB BLD-MCNC: 14.8 G/DL — SIGNIFICANT CHANGE UP (ref 14–18)
HGB BLD-MCNC: 15.9 G/DL — SIGNIFICANT CHANGE UP (ref 14–18)
LACTATE BLDV-MCNC: 2.3 MMOL/L — HIGH (ref 0.5–2)
LIDOCAIN IGE QN: 10 U/L — LOW (ref 22–51)
LYMPHOCYTES # BLD AUTO: 1.3 K/UL — SIGNIFICANT CHANGE UP (ref 1–4.8)
LYMPHOCYTES # BLD AUTO: 7 % — LOW (ref 20–55)
MCHC RBC-ENTMCNC: 32.7 PG — HIGH (ref 27–31)
MCHC RBC-ENTMCNC: 33 PG — HIGH (ref 27–31)
MCHC RBC-ENTMCNC: 35.9 G/DL — SIGNIFICANT CHANGE UP (ref 32–36)
MCHC RBC-ENTMCNC: 36.5 G/DL — HIGH (ref 32–36)
MCV RBC AUTO: 90.5 FL — SIGNIFICANT CHANGE UP (ref 80–94)
MCV RBC AUTO: 91.2 FL — SIGNIFICANT CHANGE UP (ref 80–94)
MONOCYTES # BLD AUTO: 0.7 K/UL — SIGNIFICANT CHANGE UP (ref 0–0.8)
MONOCYTES NFR BLD AUTO: 6 % — SIGNIFICANT CHANGE UP (ref 3–10)
NEUTROPHILS # BLD AUTO: 21.5 K/UL — HIGH (ref 1.8–8)
NEUTROPHILS NFR BLD AUTO: 85 % — HIGH (ref 37–73)
PLAT MORPH BLD: NORMAL — SIGNIFICANT CHANGE UP
PLATELET # BLD AUTO: 259 K/UL — SIGNIFICANT CHANGE UP (ref 150–400)
PLATELET # BLD AUTO: 330 K/UL — SIGNIFICANT CHANGE UP (ref 150–400)
POTASSIUM SERPL-MCNC: 4.3 MMOL/L — SIGNIFICANT CHANGE UP (ref 3.5–5.3)
POTASSIUM SERPL-SCNC: 4.3 MMOL/L — SIGNIFICANT CHANGE UP (ref 3.5–5.3)
PROT SERPL-MCNC: 8.4 G/DL — SIGNIFICANT CHANGE UP (ref 6.6–8.7)
RBC # BLD: 4.52 M/UL — LOW (ref 4.6–6.2)
RBC # BLD: 4.82 M/UL — SIGNIFICANT CHANGE UP (ref 4.6–6.2)
RBC # FLD: 13.3 % — SIGNIFICANT CHANGE UP (ref 11–15.6)
RBC # FLD: 13.6 % — SIGNIFICANT CHANGE UP (ref 11–15.6)
RBC BLD AUTO: NORMAL — SIGNIFICANT CHANGE UP
SODIUM SERPL-SCNC: 141 MMOL/L — SIGNIFICANT CHANGE UP (ref 135–145)
WBC # BLD: 19.8 K/UL — HIGH (ref 4.8–10.8)
WBC # BLD: 23.5 K/UL — HIGH (ref 4.8–10.8)
WBC # FLD AUTO: 19.8 K/UL — HIGH (ref 4.8–10.8)
WBC # FLD AUTO: 23.5 K/UL — HIGH (ref 4.8–10.8)

## 2017-08-20 PROCEDURE — 99284 EMERGENCY DEPT VISIT MOD MDM: CPT

## 2017-08-20 PROCEDURE — 99223 1ST HOSP IP/OBS HIGH 75: CPT

## 2017-08-20 PROCEDURE — 99223 1ST HOSP IP/OBS HIGH 75: CPT | Mod: GC

## 2017-08-20 PROCEDURE — 71010: CPT | Mod: 26

## 2017-08-20 PROCEDURE — 93010 ELECTROCARDIOGRAM REPORT: CPT

## 2017-08-20 PROCEDURE — 74176 CT ABD & PELVIS W/O CONTRAST: CPT | Mod: 26

## 2017-08-20 RX ORDER — LACTOBACILLUS ACIDOPHILUS 100MM CELL
1 CAPSULE ORAL
Qty: 0 | Refills: 0 | Status: DISCONTINUED | OUTPATIENT
Start: 2017-08-20 | End: 2017-08-22

## 2017-08-20 RX ORDER — SODIUM CHLORIDE 9 MG/ML
1000 INJECTION INTRAMUSCULAR; INTRAVENOUS; SUBCUTANEOUS ONCE
Qty: 0 | Refills: 0 | Status: COMPLETED | OUTPATIENT
Start: 2017-08-20 | End: 2017-08-20

## 2017-08-20 RX ORDER — CEFTRIAXONE 500 MG/1
INJECTION, POWDER, FOR SOLUTION INTRAMUSCULAR; INTRAVENOUS
Qty: 0 | Refills: 0 | Status: DISCONTINUED | OUTPATIENT
Start: 2017-08-20 | End: 2017-08-22

## 2017-08-20 RX ORDER — ACETAMINOPHEN 500 MG
650 TABLET ORAL ONCE
Qty: 0 | Refills: 0 | Status: COMPLETED | OUTPATIENT
Start: 2017-08-20 | End: 2017-08-20

## 2017-08-20 RX ORDER — HALOPERIDOL DECANOATE 100 MG/ML
5 INJECTION INTRAMUSCULAR ONCE
Qty: 0 | Refills: 0 | Status: COMPLETED | OUTPATIENT
Start: 2017-08-20 | End: 2017-08-20

## 2017-08-20 RX ORDER — CEFTRIAXONE 500 MG/1
1 INJECTION, POWDER, FOR SOLUTION INTRAMUSCULAR; INTRAVENOUS EVERY 24 HOURS
Qty: 0 | Refills: 0 | Status: DISCONTINUED | OUTPATIENT
Start: 2017-08-21 | End: 2017-08-22

## 2017-08-20 RX ORDER — ONDANSETRON 8 MG/1
4 TABLET, FILM COATED ORAL EVERY 6 HOURS
Qty: 0 | Refills: 0 | Status: DISCONTINUED | OUTPATIENT
Start: 2017-08-20 | End: 2017-08-22

## 2017-08-20 RX ORDER — ONDANSETRON 8 MG/1
4 TABLET, FILM COATED ORAL EVERY 4 HOURS
Qty: 0 | Refills: 0 | Status: DISCONTINUED | OUTPATIENT
Start: 2017-08-20 | End: 2017-08-20

## 2017-08-20 RX ORDER — METOCLOPRAMIDE HCL 10 MG
10 TABLET ORAL ONCE
Qty: 0 | Refills: 0 | Status: COMPLETED | OUTPATIENT
Start: 2017-08-20 | End: 2017-08-20

## 2017-08-20 RX ORDER — FAMOTIDINE 10 MG/ML
20 INJECTION INTRAVENOUS ONCE
Qty: 0 | Refills: 0 | Status: COMPLETED | OUTPATIENT
Start: 2017-08-20 | End: 2017-08-20

## 2017-08-20 RX ORDER — CEFTRIAXONE 500 MG/1
1 INJECTION, POWDER, FOR SOLUTION INTRAMUSCULAR; INTRAVENOUS ONCE
Qty: 0 | Refills: 0 | Status: COMPLETED | OUTPATIENT
Start: 2017-08-20 | End: 2017-08-20

## 2017-08-20 RX ORDER — MORPHINE SULFATE 50 MG/1
4 CAPSULE, EXTENDED RELEASE ORAL EVERY 4 HOURS
Qty: 0 | Refills: 0 | Status: DISCONTINUED | OUTPATIENT
Start: 2017-08-20 | End: 2017-08-21

## 2017-08-20 RX ORDER — SODIUM CHLORIDE 9 MG/ML
1000 INJECTION INTRAMUSCULAR; INTRAVENOUS; SUBCUTANEOUS
Qty: 0 | Refills: 0 | Status: DISCONTINUED | OUTPATIENT
Start: 2017-08-20 | End: 2017-08-22

## 2017-08-20 RX ORDER — DIPHENHYDRAMINE HCL 50 MG
50 CAPSULE ORAL ONCE
Qty: 0 | Refills: 0 | Status: COMPLETED | OUTPATIENT
Start: 2017-08-20 | End: 2017-08-20

## 2017-08-20 RX ORDER — ENOXAPARIN SODIUM 100 MG/ML
40 INJECTION SUBCUTANEOUS EVERY 24 HOURS
Qty: 0 | Refills: 0 | Status: DISCONTINUED | OUTPATIENT
Start: 2017-08-20 | End: 2017-08-22

## 2017-08-20 RX ORDER — SODIUM CHLORIDE 9 MG/ML
1000 INJECTION INTRAMUSCULAR; INTRAVENOUS; SUBCUTANEOUS
Qty: 0 | Refills: 0 | Status: DISCONTINUED | OUTPATIENT
Start: 2017-08-20 | End: 2017-08-20

## 2017-08-20 RX ORDER — PANTOPRAZOLE SODIUM 20 MG/1
40 TABLET, DELAYED RELEASE ORAL EVERY 12 HOURS
Qty: 0 | Refills: 0 | Status: DISCONTINUED | OUTPATIENT
Start: 2017-08-20 | End: 2017-08-22

## 2017-08-20 RX ORDER — MORPHINE SULFATE 50 MG/1
2 CAPSULE, EXTENDED RELEASE ORAL EVERY 4 HOURS
Qty: 0 | Refills: 0 | Status: DISCONTINUED | OUTPATIENT
Start: 2017-08-20 | End: 2017-08-21

## 2017-08-20 RX ADMIN — PANTOPRAZOLE SODIUM 40 MILLIGRAM(S): 20 TABLET, DELAYED RELEASE ORAL at 22:17

## 2017-08-20 RX ADMIN — Medication 50 MILLIGRAM(S): at 12:27

## 2017-08-20 RX ADMIN — FAMOTIDINE 20 MILLIGRAM(S): 10 INJECTION INTRAVENOUS at 15:05

## 2017-08-20 RX ADMIN — MORPHINE SULFATE 2 MILLIGRAM(S): 50 CAPSULE, EXTENDED RELEASE ORAL at 22:17

## 2017-08-20 RX ADMIN — Medication 10 MILLIGRAM(S): at 12:28

## 2017-08-20 RX ADMIN — ONDANSETRON 4 MILLIGRAM(S): 8 TABLET, FILM COATED ORAL at 21:32

## 2017-08-20 RX ADMIN — SODIUM CHLORIDE 1000 MILLILITER(S): 9 INJECTION INTRAMUSCULAR; INTRAVENOUS; SUBCUTANEOUS at 13:00

## 2017-08-20 RX ADMIN — Medication 2 MILLIGRAM(S): at 19:40

## 2017-08-20 RX ADMIN — SODIUM CHLORIDE 125 MILLILITER(S): 9 INJECTION INTRAMUSCULAR; INTRAVENOUS; SUBCUTANEOUS at 22:15

## 2017-08-20 RX ADMIN — SODIUM CHLORIDE 1000 MILLILITER(S): 9 INJECTION INTRAMUSCULAR; INTRAVENOUS; SUBCUTANEOUS at 15:00

## 2017-08-20 RX ADMIN — HALOPERIDOL DECANOATE 5 MILLIGRAM(S): 100 INJECTION INTRAMUSCULAR at 12:27

## 2017-08-20 RX ADMIN — CEFTRIAXONE 100 GRAM(S): 500 INJECTION, POWDER, FOR SOLUTION INTRAMUSCULAR; INTRAVENOUS at 22:15

## 2017-08-20 NOTE — H&P ADULT - PROBLEM SELECTOR PLAN 4
- patient with elevated Alk Phos, mildly elevated AST, similar to previous admission  - CT abdomen 6/2017 showing fatty liver  - previous work-up for liver pathology including Hep serology were -ve  - will monitor as needed - IVF NS 75ML/hr continuous  - f/u Utox, alcohol level

## 2017-08-20 NOTE — ED ADULT NURSE NOTE - CHIEF COMPLAINT QUOTE
Pt axox3 c/o severe abdominal pain and nausea x1 day. Pt is uncooperative with staff , thrashing in stretcher and screaming for pain medication.

## 2017-08-20 NOTE — ED ADULT TRIAGE NOTE - CHIEF COMPLAINT QUOTE
Pt axox3 c/o severe abdominal pain and nausea x1 day. Pt denies any recent fevers or diarrhea. Pt axox3 c/o severe abdominal pain and nausea x1 day. Pt is uncooperative with staff , thrashing in stretcher and screaming for pain medication.

## 2017-08-20 NOTE — H&P ADULT - NSHPPHYSICALEXAM_GEN_ALL_CORE
PE:  22 y/o sleeping in bed, in NAD,   HEENT: NCAT, PERRL, normal conjunctivae  Lungs: CTA, no rales. rhonchi, wheezes  Heart: RRR, no murmurs, rubs, gallops  Abdomen: +BS x 4 quadrants, non-tender, non-distended  Extremities: peripheral pulses intact, no edema or cyanosis  Neuro: Patient non-arousable, currently sleeping and non-responsive to verbal or tactile stimulus  Skin: no rashes appreciated Vital Signs Last 24 Hrs  T(C): 37.3 (21 Aug 2017 00:13), Max: 37.8 (20 Aug 2017 19:38)  T(F): 99.2 (21 Aug 2017 00:13), Max: 100 (20 Aug 2017 19:38)  HR: 96 (21 Aug 2017 00:13) (74 - 116)  BP: 116/68 (21 Aug 2017 00:13) (114/56 - 140/77)  RR: 22 (21 Aug 2017 00:13) (22 - 23)  SpO2: 98% (21 Aug 2017 00:13) (98% - 100%)    PE:  22 y/o sleeping in bed, in NAD,   HEENT: NCAT, PERRL, normal conjunctivae  Lungs: CTA, no rales. rhonchi, wheezes  Heart: RRR, no murmurs, rubs, gallops  Abdomen: +BS x 4 quadrants, non-tender, non-distended  Extremities: peripheral pulses intact, no edema or cyanosis  Neuro: Patient non-arousable, currently sleeping and non-responsive to verbal or tactile stimulus  Skin: no rashes appreciated Vital Signs Last 24 Hrs  T(C): 37.3 (21 Aug 2017 00:13), Max: 37.8 (20 Aug 2017 19:38)  T(F): 99.2 (21 Aug 2017 00:13), Max: 100 (20 Aug 2017 19:38)  HR: 96 (21 Aug 2017 00:13) (74 - 116)  BP: 116/68 (21 Aug 2017 00:13) (114/56 - 140/77)  RR: 22 (21 Aug 2017 00:13) (22 - 23)  SpO2: 98% (21 Aug 2017 00:13) (98% - 100%)    PE:  20 y/o sleeping in bed, in NAD,   HEENT: NCAT, PERRL, normal conjunctivae  Lungs: CTA, no rales. rhonchi, wheezes  Heart: RRR, no murmurs, rubs, gallops  Abdomen: +BS x 4 quadrants, non-tender, non-distended  Extremities: peripheral pulses intact, no edema or cyanosis  Neuro: Patient currently sleeping and non-responsive to verbal or tactile stimulus  Skin: bruises in medial aspect of leg

## 2017-08-20 NOTE — ED PROVIDER NOTE - OBJECTIVE STATEMENT
20 yo hm pmh gastritis , substance abuse comes to ed with abdominal pain with vomiting; denies fever or diarrhea; pt demanding pain medications for abdominal pain; Code grey called pt yelling, kicking stretcher and removing iv;

## 2017-08-20 NOTE — H&P ADULT - NSHPLABSRESULTS_GEN_ALL_CORE
14.8   19.8  )-----------( 259      ( 20 Aug 2017 17:15 )             41.2   08-20    141  |  98  |  16.0  ----------------------------<  65<L>  4.3   |  16.0<L>  |  0.54    Ca    9.8      20 Aug 2017 12:36    TPro  8.4  /  Alb  5.1  /  TBili  0.6  /  DBili  x   /  AST  51<H>  /  ALT  17  /  AlkPhos  148<H>  08-20

## 2017-08-20 NOTE — H&P ADULT - PROBLEM SELECTOR PLAN 2
- f/u urine toxocology  -  ml/hr NS continuous  - patient with history of substance abuse - f/u urine toxocology  -  ml/hr NS continuous  - patient with history of substance abuse  - patient agitated in the ED, kicking the equipment, pulling IV, given 5 mg haloperidol and 2mg Ativan in the ED - f/u urine toxicology  - patient with history of substance abuse, social work consult  - patient agitated in the ED, kicking the equipment, pulling IV, given 5 mg haloperidol and 2mg Ativan in the ED  - consider psych consult if patient remains agitated

## 2017-08-20 NOTE — H&P ADULT - ASSESSMENT
21 year old male smoker w/ PMH of Gastritis & Substance abuse (active marijuana use) s/p cholecystectomy, prior multiple hospitalizations for abd pain/N/V (over last 3 years)and completed GI work up with negative colonoscopy 1 yr ago currently brought to ED  for acute onset of abdominal pain and admitted for intractable abdominal pain/N/V. Patient with leukocytosis, elevated lactat 21 year old male smoker w/ PMH of Gastritis & Substance abuse (active marijuana use) s/p cholecystectomy, prior multiple hospitalizations for abd pain/N/V (over last 3 years) and completed GI work up with negative colonoscopy 1 yr ago currently brought to ED  for acute onset of abdominal pain and admitted for intractable abdominal pain/N/V.     Admit to resident service under Dr. Bales  Activity ambulate as tolerated  Diet NPO, advance as tolerated 21 year old male smoker w/ PMH of Gastritis & Substance abuse (active marijuana use) s/p cholecystectomy, prior multiple ER visits and recent hospitalization in 6/17 for abd pain/N/V (over last 3 years) and completed GI work up with negative colonoscopy 1 yr ago currently brought to ED  for acute onset of abdominal pain and admitted for intractable abdominal pain and N/V.     Admit to resident service under Dr. Bales  Activity ambulate as tolerated  Diet NPO, advance as tolerated 21 year old male smoker w/ PMH of Gastritis & Substance abuse (active marijuana use) s/p cholecystectomy, prior multiple ER visits and recent hospitalization in 6/17 for abd pain/N/V (over last 3 years) and completed GI work up with negative EGD 1 yr ago currently brought to ED  for acute onset of abdominal pain and admitted for intractable abdominal pain and N/V.     Admit to resident service under Dr. Bales  Activity ambulate as tolerated  Diet NPO, advance as tolerated

## 2017-08-20 NOTE — ED ADULT NURSE REASSESSMENT NOTE - NS ED NURSE REASSESS COMMENT FT1
pt becoming combative with staff at this time. kicking punching. security to bedside. removed iv from arm. security remains at bedside for safety of staff. pt becoming combative with staff at this time. kicking punching. security to bedside. removed iv from arm. security remains at bedside for safety of staff. Pt place din yellow gown, belongings removed and bagged.

## 2017-08-20 NOTE — H&P ADULT - NSHPREVIEWOFSYSTEMS_GEN_ALL_CORE
From chart:  Review of Systems:  · CONSTITUTIONAL: no fever and no chills.  · EYES: no discharge, no irritation, no pain, no redness, and no visual changes.  · ENMT: Ears: no ear pain and no hearing problems. Nose: no nasal congestion and no nasal drainage. Mouth/Throat: no dysphagia, no hoarseness and no throat pain. Neck: no lumps, no pain, no stiffness and no swollen glands.  · CARDIOVASCULAR: normal rate and rhythm, no chest pain and no edema.  · RESPIRATORY: no chest pain, no cough, and no shortness of breath.  · Gastrointestinal [+]: ABDOMINAL PAIN  · MUSCULOSKELETAL: no back pain, no gout, no musculoskeletal pain, no neck pain, and no weakness.  · SKIN: no abrasions, no jaundice, no lesions, no pruritis, and no rashes. From chart:  Review of Systems:  · CONSTITUTIONAL: no fever and no chills.  · EYES: no discharge, no irritation, no pain, no redness, and no visual changes.  · ENMT: Ears: no ear pain and no hearing problems. Nose: no nasal congestion and no nasal drainage. Mouth/Throat: no dysphagia, no hoarseness and no throat pain. Neck: no lumps, no pain, no stiffness and no swollen glands.  · CARDIOVASCULAR: normal rate and rhythm, no chest pain and no edema.  · RESPIRATORY: no chest pain, no cough, and no shortness of breath.  · Gastrointestinal [+]: ABDOMINAL PAIN, NAUSEA, VOMITING  · MUSCULOSKELETAL: no back pain, no gout, no musculoskeletal pain, no neck pain, and no weakness.  · SKIN: no abrasions, no jaundice, no lesions, no pruritis, and no rashes.

## 2017-08-20 NOTE — ED ADULT NURSE REASSESSMENT NOTE - NS ED NURSE REASSESS COMMENT FT1
Pt diaphoretic laying on stretcher, offers no complaints, pt tremulous on stretcher, MD Can made aware, pt medicated as per MD orders

## 2017-08-20 NOTE — H&P ADULT - ATTENDING COMMENTS
21 years old male with PMH of Reflux Esophagitis/Gastritis and Substance Abuse seen and evaluated in ER. Continue current treatment including empiric antibiotics. GI eval if symptoms do not improve. Complete evaluation when he is more awake.

## 2017-08-20 NOTE — H&P ADULT - PROBLEM SELECTOR PLAN 1
- likely gastritis/reflux esophagitis  - patient with extensive GI work-up on previous admissions for similar complaints, further work-up as recommended per GI  - will keep NPO and advance diet as tolerated,  ml/hr NS continuous  - Protonix 40 IV daily  - Morphine 4 mg and 2mg IV q4hrs for severe and moderate pain respectively  - Zofran for vomiting - likely gastritis/reflux esophagitis  - patient with extensive GI work-up on previous admissions for similar complaints, further work-up as recommended per GI  - will keep NPO and advance diet as tolerated,  ml/hr NS continuous  - Protonix 40 IV BID, Carafate 1mg PO Q6hrs  - Morphine 4 mg and 2mg IV q4hrs for severe and moderate pain respectively  - Zofran 4mg q 6hrs for vomiting  - will consider GI consult if pain does not remove

## 2017-08-20 NOTE — H&P ADULT - PROBLEM SELECTOR PLAN 3
- Patient with leukocytosis, elevated lactate, urinary retention intitially on admission, suspicious for possible UTI vs other infection  - will treat prophylactically with Ceftriaxone 1 g IV daily  - f/u UA, UCx, and blood Cx for evaluate for infectious source - Patient with leukocytosis, elevated lactate, urinary retention initially on admission, suspicious for possible UTI vs other infection  - will treat prophylactically with Ceftriaxone 1 g IV daily  - f/u UA, UCx, and blood Cx for evaluate for infectious source

## 2017-08-20 NOTE — H&P ADULT - PROBLEM SELECTOR PLAN 5
- Lovenox 40 mg subq daily - patient with elevated Alk Phos, mildly elevated AST, similar to previous admission  - CT abdomen 6/2017 showing fatty liver  - previous work-up for liver pathology including Hep serology were -ve  - chronic elevation, f/u with GI as outpatient

## 2017-08-20 NOTE — H&P ADULT - PSH
S/P cholecystectomy History of esophagogastroduodenoscopy (EGD)  6/2017 and 2016  S/P cholecystectomy

## 2017-08-20 NOTE — H&P ADULT - HISTORY OF PRESENT ILLNESS
History obtained primarily from chart, patient currently sleeping and non-arousable despite multiple attempts    20 y/o male with PMH  of gastritis and substance abuse comes to ED with abdominal pain with vomiting. Patient denies fever or diarrhea; pt demanding pain medications for abdominal pain. History obtained primarily from chart, patient currently sleeping and non-arousable despite multiple attempts    21 year old male smoker w/ PMH of Gastritis & Substance abuse (active marijuana use) s/p cholecystectomy, prior multiple hospitalizations for abd pain/N/V (over last 3 years)and completed GI work up with negative colonoscopy 1 yr ago currently brought to ED  for acute onset of abdominal pain and admitted for intractable abdominal pain/N/V. Patient denies fever or diarrhea; pt demanding pain medications for abdominal pain.    Of note, patient recently discharged from [facility]  (6/2017) after -ve GI work-up for similar complaints as current admission,  EGD showed mild esophagitis and gastritis with H. pylori biopsies being negative just showing reflux esophagitis. CT abdomen did not reveal any acute intra-abdominal pathology at that time. patient further had scrotal pain ([id number]), b/l testicular microlithiasis on US and diagnosed patient as having right sided epididymitis for which he was discharged on a 10-day course of Levofloxacin and outpatient f/u. History obtained primarily from chart, patient currently sleeping and non-arousable despite multiple attempts    21 year old male smoker w/ PMH of Gastritis & Substance abuse (active marijuana use) s/p cholecystectomy, prior multiple ED visits with recent hospitalization in 6/2017 for abd pain/N/V (over last 3 years)and completed GI work up with negative colonoscopy 1 yr ago currently brought to ED  for acute onset of abdominal pain and admitted for intractable abdominal pain/N/V. Patient denies fever or diarrhea; pt demanding pain medications for abdominal pain.    Of note, patient recently discharged from [facility]  (6/2017) after -ve GI work-up for similar complaints as current admission,  EGD showed mild esophagitis and gastritis with H. pylori biopsies being negative just showing reflux esophagitis. CT abdomen did not reveal any acute intra-abdominal pathology at that time. patient further had scrotal pain ([id number]), b/l testicular microlithiasis on US and diagnosed patient as having right sided epididymitis for which he was discharged on a 10-day course of Levofloxacin and outpatient f/u. History obtained primarily from chart, patient currently sleeping and non-arousable despite multiple attempts    21 year old male smoker w/ PMH of Gastritis & Substance abuse (active marijuana use) s/p cholecystectomy, prior multiple ED visits with recent hospitalization in 6/2017 for abd pain/N/V (over last 3 years)and completed GI work up with negative EGD 1 yr ago currently brought to ED  for acute onset of abdominal pain and admitted for intractable abdominal pain/N/V. Patient denies fever or diarrhea; pt demanding pain medications for abdominal pain.    Of note, patient recently discharged from [facility]  (6/2017) after -ve GI work-up for similar complaints as current admission,  EGD showed mild esophagitis and gastritis with H. pylori biopsies being negative just showing reflux esophagitis. CT abdomen did not reveal any acute intra-abdominal pathology at that time. patient further had scrotal pain ([id number]), b/l testicular microlithiasis on US and diagnosed patient as having right sided epididymitis for which he was discharged on a 10-day course of Levofloxacin and outpatient f/u. History obtained primarily from chart, patient currently sleeping, accusable but goes back to sleep, unable to provide any information a he was given  Ativan and Haloperidol in the ED.    21 year old male smoker w/ PMH of Gastritis & Substance abuse (active marijuana use) s/p cholecystectomy, prior multiple ED visits with recent hospitalization in 6/2017 for abd pain/N/V (over last 3 years)and completed GI work up with negative EGD 1 yr ago currently brought to ED  for acute onset of abdominal pain and admitted for intractable abdominal pain/N/V. Patient denies fever or diarrhea; pt demanding pain medications for abdominal pain.    Of note, patient recently discharged from [facility]  (6/2017) after -ve GI work-up for similar complaints as current admission,  EGD showed mild esophagitis and gastritis with H. pylori biopsies being negative just showing reflux esophagitis. CT abdomen did not reveal any acute intra-abdominal pathology at that time. patient further had scrotal pain ([id number]), b/l testicular microlithiasis on US and diagnosed patient as having right sided epididymitis for which he was discharged on a 10-day course of Levofloxacin and outpatient f/u.

## 2017-08-21 DIAGNOSIS — R74.0 NONSPECIFIC ELEVATION OF LEVELS OF TRANSAMINASE AND LACTIC ACID DEHYDROGENASE [LDH]: ICD-10-CM

## 2017-08-21 DIAGNOSIS — E87.2 ACIDOSIS: ICD-10-CM

## 2017-08-21 DIAGNOSIS — Z98.890 OTHER SPECIFIED POSTPROCEDURAL STATES: Chronic | ICD-10-CM

## 2017-08-21 DIAGNOSIS — Z29.9 ENCOUNTER FOR PROPHYLACTIC MEASURES, UNSPECIFIED: ICD-10-CM

## 2017-08-21 DIAGNOSIS — R10.9 UNSPECIFIED ABDOMINAL PAIN: ICD-10-CM

## 2017-08-21 DIAGNOSIS — D72.829 ELEVATED WHITE BLOOD CELL COUNT, UNSPECIFIED: ICD-10-CM

## 2017-08-21 DIAGNOSIS — F19.10 OTHER PSYCHOACTIVE SUBSTANCE ABUSE, UNCOMPLICATED: ICD-10-CM

## 2017-08-21 LAB
ALBUMIN SERPL ELPH-MCNC: 4.1 G/DL — SIGNIFICANT CHANGE UP (ref 3.3–5.2)
ALBUMIN SERPL ELPH-MCNC: 4.2 G/DL — SIGNIFICANT CHANGE UP (ref 3.3–5.2)
ALP SERPL-CCNC: 113 U/L — SIGNIFICANT CHANGE UP (ref 40–120)
ALP SERPL-CCNC: 120 U/L — SIGNIFICANT CHANGE UP (ref 40–120)
ALT FLD-CCNC: 16 U/L — SIGNIFICANT CHANGE UP
ALT FLD-CCNC: 18 U/L — SIGNIFICANT CHANGE UP
AMPHET UR-MCNC: NEGATIVE — SIGNIFICANT CHANGE UP
ANION GAP SERPL CALC-SCNC: 16 MMOL/L — SIGNIFICANT CHANGE UP (ref 5–17)
ANION GAP SERPL CALC-SCNC: 20 MMOL/L — HIGH (ref 5–17)
APPEARANCE UR: CLEAR — SIGNIFICANT CHANGE UP
AST SERPL-CCNC: 39 U/L — SIGNIFICANT CHANGE UP
AST SERPL-CCNC: 44 U/L — HIGH
BACTERIA # UR AUTO: ABNORMAL
BARBITURATES UR SCN-MCNC: NEGATIVE — SIGNIFICANT CHANGE UP
BASOPHILS # BLD AUTO: 0 K/UL — SIGNIFICANT CHANGE UP (ref 0–0.2)
BASOPHILS NFR BLD AUTO: 0.1 % — SIGNIFICANT CHANGE UP (ref 0–2)
BENZODIAZ UR-MCNC: NEGATIVE — SIGNIFICANT CHANGE UP
BILIRUB DIRECT SERPL-MCNC: 0.2 MG/DL — SIGNIFICANT CHANGE UP (ref 0–0.3)
BILIRUB INDIRECT FLD-MCNC: 0.6 MG/DL — SIGNIFICANT CHANGE UP (ref 0.2–1)
BILIRUB SERPL-MCNC: 0.8 MG/DL — SIGNIFICANT CHANGE UP (ref 0.4–2)
BILIRUB SERPL-MCNC: 0.9 MG/DL — SIGNIFICANT CHANGE UP (ref 0.4–2)
BILIRUB UR-MCNC: NEGATIVE — SIGNIFICANT CHANGE UP
BUN SERPL-MCNC: 10 MG/DL — SIGNIFICANT CHANGE UP (ref 8–20)
BUN SERPL-MCNC: 12 MG/DL — SIGNIFICANT CHANGE UP (ref 8–20)
CALCIUM SERPL-MCNC: 8.6 MG/DL — SIGNIFICANT CHANGE UP (ref 8.6–10.2)
CALCIUM SERPL-MCNC: 8.6 MG/DL — SIGNIFICANT CHANGE UP (ref 8.6–10.2)
CHLORIDE SERPL-SCNC: 97 MMOL/L — LOW (ref 98–107)
CHLORIDE SERPL-SCNC: 98 MMOL/L — SIGNIFICANT CHANGE UP (ref 98–107)
CHOLEST SERPL-MCNC: 102 MG/DL — LOW (ref 110–199)
CK MB CFR SERPL CALC: 12.5 NG/ML — HIGH (ref 0–6.7)
CK SERPL-CCNC: 746 U/L — HIGH (ref 30–200)
CO2 SERPL-SCNC: 18 MMOL/L — LOW (ref 22–29)
CO2 SERPL-SCNC: 22 MMOL/L — SIGNIFICANT CHANGE UP (ref 22–29)
COCAINE METAB.OTHER UR-MCNC: POSITIVE
COLOR SPEC: YELLOW — SIGNIFICANT CHANGE UP
CREAT SERPL-MCNC: 0.52 MG/DL — SIGNIFICANT CHANGE UP (ref 0.5–1.3)
CREAT SERPL-MCNC: 0.52 MG/DL — SIGNIFICANT CHANGE UP (ref 0.5–1.3)
DIFF PNL FLD: NEGATIVE — SIGNIFICANT CHANGE UP
EOSINOPHIL # BLD AUTO: 0 K/UL — SIGNIFICANT CHANGE UP (ref 0–0.5)
EOSINOPHIL NFR BLD AUTO: 0.1 % — SIGNIFICANT CHANGE UP (ref 0–6)
EPI CELLS # UR: SIGNIFICANT CHANGE UP
ETHANOL SERPL-MCNC: <10 MG/DL — SIGNIFICANT CHANGE UP
GLUCOSE SERPL-MCNC: 103 MG/DL — SIGNIFICANT CHANGE UP (ref 70–115)
GLUCOSE SERPL-MCNC: 91 MG/DL — SIGNIFICANT CHANGE UP (ref 70–115)
GLUCOSE UR QL: NEGATIVE MG/DL — SIGNIFICANT CHANGE UP
HCT VFR BLD CALC: 41.3 % — LOW (ref 42–52)
HDLC SERPL-MCNC: 67 MG/DL — SIGNIFICANT CHANGE UP
HGB BLD-MCNC: 14.4 G/DL — SIGNIFICANT CHANGE UP (ref 14–18)
KETONES UR-MCNC: ABNORMAL
LACTATE BLDV-MCNC: 1.5 MMOL/L — SIGNIFICANT CHANGE UP (ref 0.5–2)
LEUKOCYTE ESTERASE UR-ACNC: NEGATIVE — SIGNIFICANT CHANGE UP
LIPID PNL WITH DIRECT LDL SERPL: 23 MG/DL — SIGNIFICANT CHANGE UP
LYMPHOCYTES # BLD AUTO: 1.7 K/UL — SIGNIFICANT CHANGE UP (ref 1–4.8)
LYMPHOCYTES # BLD AUTO: 10.3 % — LOW (ref 20–55)
MAGNESIUM SERPL-MCNC: 2 MG/DL — SIGNIFICANT CHANGE UP (ref 1.6–2.6)
MAGNESIUM SERPL-MCNC: 2.1 MG/DL — SIGNIFICANT CHANGE UP (ref 1.6–2.6)
MCHC RBC-ENTMCNC: 32.3 PG — HIGH (ref 27–31)
MCHC RBC-ENTMCNC: 34.9 G/DL — SIGNIFICANT CHANGE UP (ref 32–36)
MCV RBC AUTO: 92.6 FL — SIGNIFICANT CHANGE UP (ref 80–94)
METHADONE UR-MCNC: NEGATIVE — SIGNIFICANT CHANGE UP
MONOCYTES # BLD AUTO: 1.3 K/UL — HIGH (ref 0–0.8)
MONOCYTES NFR BLD AUTO: 7.9 % — SIGNIFICANT CHANGE UP (ref 3–10)
NEUTROPHILS # BLD AUTO: 13.4 K/UL — HIGH (ref 1.8–8)
NEUTROPHILS NFR BLD AUTO: 81.2 % — HIGH (ref 37–73)
NITRITE UR-MCNC: NEGATIVE — SIGNIFICANT CHANGE UP
OPIATES UR-MCNC: POSITIVE
PCP SPEC-MCNC: SIGNIFICANT CHANGE UP
PCP UR-MCNC: NEGATIVE — SIGNIFICANT CHANGE UP
PH UR: 5 — SIGNIFICANT CHANGE UP (ref 5–8)
PHOSPHATE SERPL-MCNC: 2 MG/DL — LOW (ref 2.4–4.7)
PHOSPHATE SERPL-MCNC: 3.1 MG/DL — SIGNIFICANT CHANGE UP (ref 2.4–4.7)
PLATELET # BLD AUTO: 279 K/UL — SIGNIFICANT CHANGE UP (ref 150–400)
POTASSIUM SERPL-MCNC: 3.7 MMOL/L — SIGNIFICANT CHANGE UP (ref 3.5–5.3)
POTASSIUM SERPL-MCNC: 3.8 MMOL/L — SIGNIFICANT CHANGE UP (ref 3.5–5.3)
POTASSIUM SERPL-SCNC: 3.7 MMOL/L — SIGNIFICANT CHANGE UP (ref 3.5–5.3)
POTASSIUM SERPL-SCNC: 3.8 MMOL/L — SIGNIFICANT CHANGE UP (ref 3.5–5.3)
PROT SERPL-MCNC: 6.7 G/DL — SIGNIFICANT CHANGE UP (ref 6.6–8.7)
PROT SERPL-MCNC: 6.7 G/DL — SIGNIFICANT CHANGE UP (ref 6.6–8.7)
PROT UR-MCNC: NEGATIVE MG/DL — SIGNIFICANT CHANGE UP
RBC # BLD: 4.46 M/UL — LOW (ref 4.6–6.2)
RBC # FLD: 13.7 % — SIGNIFICANT CHANGE UP (ref 11–15.6)
RBC CASTS # UR COMP ASSIST: SIGNIFICANT CHANGE UP /HPF (ref 0–4)
SODIUM SERPL-SCNC: 135 MMOL/L — SIGNIFICANT CHANGE UP (ref 135–145)
SODIUM SERPL-SCNC: 136 MMOL/L — SIGNIFICANT CHANGE UP (ref 135–145)
SP GR SPEC: 1.02 — SIGNIFICANT CHANGE UP (ref 1.01–1.02)
THC UR QL: POSITIVE
TOTAL CHOLESTEROL/HDL RATIO MEASUREMENT: 2 RATIO — LOW (ref 3.4–9.6)
TRIGL SERPL-MCNC: 61 MG/DL — SIGNIFICANT CHANGE UP (ref 10–200)
TROPONIN T SERPL-MCNC: <0.01 NG/ML — SIGNIFICANT CHANGE UP (ref 0–0.06)
UROBILINOGEN FLD QL: NEGATIVE MG/DL — SIGNIFICANT CHANGE UP
WBC # BLD: 16.4 K/UL — HIGH (ref 4.8–10.8)
WBC # FLD AUTO: 16.4 K/UL — HIGH (ref 4.8–10.8)
WBC UR QL: SIGNIFICANT CHANGE UP

## 2017-08-21 PROCEDURE — 74177 CT ABD & PELVIS W/CONTRAST: CPT | Mod: 26

## 2017-08-21 PROCEDURE — 99233 SBSQ HOSP IP/OBS HIGH 50: CPT

## 2017-08-21 PROCEDURE — 93010 ELECTROCARDIOGRAM REPORT: CPT

## 2017-08-21 RX ORDER — MORPHINE SULFATE 50 MG/1
4 CAPSULE, EXTENDED RELEASE ORAL EVERY 4 HOURS
Qty: 0 | Refills: 0 | Status: DISCONTINUED | OUTPATIENT
Start: 2017-08-21 | End: 2017-08-22

## 2017-08-21 RX ORDER — MORPHINE SULFATE 50 MG/1
2 CAPSULE, EXTENDED RELEASE ORAL EVERY 4 HOURS
Qty: 0 | Refills: 0 | Status: DISCONTINUED | OUTPATIENT
Start: 2017-08-21 | End: 2017-08-22

## 2017-08-21 RX ORDER — SODIUM,POTASSIUM PHOSPHATES 278-250MG
1 POWDER IN PACKET (EA) ORAL
Qty: 0 | Refills: 0 | Status: DISCONTINUED | OUTPATIENT
Start: 2017-08-21 | End: 2017-08-22

## 2017-08-21 RX ORDER — SUCRALFATE 1 G
1 TABLET ORAL EVERY 6 HOURS
Qty: 0 | Refills: 0 | Status: DISCONTINUED | OUTPATIENT
Start: 2017-08-21 | End: 2017-08-22

## 2017-08-21 RX ORDER — METOCLOPRAMIDE HCL 10 MG
10 TABLET ORAL EVERY 8 HOURS
Qty: 0 | Refills: 0 | Status: DISCONTINUED | OUTPATIENT
Start: 2017-08-21 | End: 2017-08-22

## 2017-08-21 RX ORDER — ACETAMINOPHEN 500 MG
650 TABLET ORAL EVERY 6 HOURS
Qty: 0 | Refills: 0 | Status: DISCONTINUED | OUTPATIENT
Start: 2017-08-21 | End: 2017-08-22

## 2017-08-21 RX ADMIN — PANTOPRAZOLE SODIUM 40 MILLIGRAM(S): 20 TABLET, DELAYED RELEASE ORAL at 17:28

## 2017-08-21 RX ADMIN — Medication 1 TABLET(S): at 08:15

## 2017-08-21 RX ADMIN — MORPHINE SULFATE 4 MILLIGRAM(S): 50 CAPSULE, EXTENDED RELEASE ORAL at 16:43

## 2017-08-21 RX ADMIN — ONDANSETRON 4 MILLIGRAM(S): 8 TABLET, FILM COATED ORAL at 17:28

## 2017-08-21 RX ADMIN — SODIUM CHLORIDE 125 MILLILITER(S): 9 INJECTION INTRAMUSCULAR; INTRAVENOUS; SUBCUTANEOUS at 11:00

## 2017-08-21 RX ADMIN — Medication 1 GRAM(S): at 10:49

## 2017-08-21 RX ADMIN — MORPHINE SULFATE 4 MILLIGRAM(S): 50 CAPSULE, EXTENDED RELEASE ORAL at 21:55

## 2017-08-21 RX ADMIN — PANTOPRAZOLE SODIUM 40 MILLIGRAM(S): 20 TABLET, DELAYED RELEASE ORAL at 08:15

## 2017-08-21 RX ADMIN — Medication 1 TABLET(S): at 17:25

## 2017-08-21 RX ADMIN — Medication 1 GRAM(S): at 17:28

## 2017-08-21 RX ADMIN — MORPHINE SULFATE 2 MILLIGRAM(S): 50 CAPSULE, EXTENDED RELEASE ORAL at 09:16

## 2017-08-21 RX ADMIN — Medication 0.5 MILLIGRAM(S): at 11:56

## 2017-08-21 RX ADMIN — Medication 2 MILLIGRAM(S): at 17:28

## 2017-08-21 RX ADMIN — MORPHINE SULFATE 2 MILLIGRAM(S): 50 CAPSULE, EXTENDED RELEASE ORAL at 09:40

## 2017-08-21 RX ADMIN — Medication 1 GRAM(S): at 06:02

## 2017-08-21 RX ADMIN — ONDANSETRON 4 MILLIGRAM(S): 8 TABLET, FILM COATED ORAL at 21:38

## 2017-08-21 RX ADMIN — SODIUM CHLORIDE 125 MILLILITER(S): 9 INJECTION INTRAMUSCULAR; INTRAVENOUS; SUBCUTANEOUS at 03:44

## 2017-08-21 RX ADMIN — Medication 2 MILLIGRAM(S): at 11:56

## 2017-08-21 RX ADMIN — MORPHINE SULFATE 4 MILLIGRAM(S): 50 CAPSULE, EXTENDED RELEASE ORAL at 21:39

## 2017-08-21 RX ADMIN — MORPHINE SULFATE 4 MILLIGRAM(S): 50 CAPSULE, EXTENDED RELEASE ORAL at 18:29

## 2017-08-21 RX ADMIN — MORPHINE SULFATE 4 MILLIGRAM(S): 50 CAPSULE, EXTENDED RELEASE ORAL at 09:10

## 2017-08-21 RX ADMIN — MORPHINE SULFATE 4 MILLIGRAM(S): 50 CAPSULE, EXTENDED RELEASE ORAL at 08:01

## 2017-08-21 RX ADMIN — MORPHINE SULFATE 2 MILLIGRAM(S): 50 CAPSULE, EXTENDED RELEASE ORAL at 00:05

## 2017-08-21 RX ADMIN — Medication 10 MILLIGRAM(S): at 09:15

## 2017-08-21 RX ADMIN — Medication 1 TABLET(S): at 17:28

## 2017-08-21 RX ADMIN — ONDANSETRON 4 MILLIGRAM(S): 8 TABLET, FILM COATED ORAL at 08:15

## 2017-08-21 RX ADMIN — ONDANSETRON 4 MILLIGRAM(S): 8 TABLET, FILM COATED ORAL at 03:43

## 2017-08-21 RX ADMIN — ENOXAPARIN SODIUM 40 MILLIGRAM(S): 100 INJECTION SUBCUTANEOUS at 04:49

## 2017-08-21 NOTE — PROGRESS NOTE ADULT - ASSESSMENT
21 year old male smoker w/ PMH of Gastritis & Substance abuse (active marijuana use) s/p cholecystectomy, prior multiple ER visits and recent hospitalization in 6/17 for abd pain/N/V (over last 3 years) and completed GI work up with negative colonoscopy 1 yr ago currently brought to ED  for acute onset of abdominal pain and admitted for intractable abdominal pain and N/V.     Admit to resident service under Dr. Bales  Activity ambulate as tolerated  Diet NPO, advance as tolerated

## 2017-08-21 NOTE — PROGRESS NOTE ADULT - PROBLEM SELECTOR PLAN 1
- likely gastritis/reflux esophagitis  - patient with extensive GI work-up on previous admissions for similar complaints, further work-up as recommended per GI  - will keep NPO and advance diet as tolerated,  ml/hr NS continuous  - Protonix 40 IV BID, Carafate 1mg PO Q6hrs  - Morphine 4 mg and 2mg IV q4hrs for severe and moderate pain respectively  - Zofran 4mg q 6hrs for vomiting  - will consider GI consult if pain does not remove - likely gastritis/reflux esophagitis  - patient with extensive GI work-up on previous admissions for similar complaints, further work-up as recommended per GI  - will keep NPO and advance diet as tolerated,  ml/hr NS continuous  - Protonix 40 IV BID, Carafate 1mg PO Q6hrs  - Zofran 4mg q 6hrs for vomiting  - will consider GI consult if pain does not remove

## 2017-08-21 NOTE — PROGRESS NOTE ADULT - PROBLEM SELECTOR PLAN 2
- f/u urine toxicology  - patient with history of substance abuse, social work consult  - patient agitated in the ED, kicking the equipment, pulling IV, given 5 mg haloperidol and 2mg Ativan in the ED  - consider psych consult if patient remains agitated - urine toxicology +ve for THC, cocaine, and opiates  - patient with history of substance abuse, social work consult  - patient agitated in the ED, kicking the equipment, pulling IV, given 5 mg haloperidol and 2mg Ativan in the ED  - consider psych consult if patient remains agitated

## 2017-08-21 NOTE — PROGRESS NOTE ADULT - PROBLEM SELECTOR PLAN 3
- Patient with leukocytosis, elevated lactate, urinary retention initially on admission, suspicious for possible UTI vs other infection  - will treat prophylactically with Ceftriaxone 1 g IV daily  - f/u UA, UCx, and blood Cx for evaluate for infectious source - Patient with leukocytosis, elevated lactate, urinary retention initially on admission, suspicious for possible UTI vs other infection  - will treat prophylactically with Ceftriaxone 1 g IV daily  - UA -ve, will f/u UCx, and blood Cx for evaluate for infectious source

## 2017-08-21 NOTE — PROGRESS NOTE ADULT - SUBJECTIVE AND OBJECTIVE BOX
21 year old male smoker w/ PMH of Gastritis & Substance abuse (active marijuana use) s/p cholecystectomy, prior multiple ER visits and recent hospitalization in 6/17 for abd pain/N/V (over last 3 years) and completed GI work up with negative EGD 1 yr ago currently brought to ED  for acute onset of abdominal pain and admitted for intractable abdominal pain and N/V. 21 year old male smoker w/ PMH of Gastritis & Substance abuse brought to ED  for acute onset of abdominal pain and admitted for intractable abdominal pain and N/V.     Progress Note:    Patient seen and examined at bedside. BM, Voiding  Patient denies current fevers, chills, n/v/, SOB, chest pain, leg swelling.     MEDICATIONS  (STANDING):  pantoprazole  Injectable 40 milliGRAM(s) IV Push every 12 hours  enoxaparin Injectable 40 milliGRAM(s) SubCutaneous every 24 hours  cefTRIAXone   IVPB   IV Intermittent   sodium chloride 0.9%. 1000 milliLiter(s) (125 mL/Hr) IV Continuous <Continuous>  ondansetron Injectable 4 milliGRAM(s) IV Push every 6 hours  lactobacillus acidophilus 1 Tablet(s) Oral two times a day with meals  cefTRIAXone   IVPB 1 Gram(s) IV Intermittent every 24 hours  sucralfate 1 Gram(s) Oral every 6 hours       Vital Signs Last 24 Hrs  T(C): 37.1 (21 Aug 2017 05:01), Max: 37.8 (20 Aug 2017 19:38)  T(F): 98.8 (21 Aug 2017 05:01), Max: 100 (20 Aug 2017 19:38)  HR: 84 (21 Aug 2017 05:01) (74 - 116)  BP: 138/69 (21 Aug 2017 05:01) (114/56 - 140/77)  RR: 22 (21 Aug 2017 05:01) (22 - 23)  SpO2: 96% (21 Aug 2017 05:01) (96% - 100%)      PE:  20 y/o sleeping in bed, in NAD,   HEENT: NCAT, PERRL, normal conjunctivae  Lungs: CTA, no rales. rhonchi, wheezes  Heart: RRR, no murmurs, rubs, gallops  Abdomen: +BS x 4 quadrants, non-tender, non-distended  Extremities: peripheral pulses intact, no edema or cyanosis  Neuro: Patient currently sleeping and non-responsive to verbal or tactile stimulus  Skin: bruises in medial aspect of legs b/l                          14.8   19.8  )-----------( 259      ( 20 Aug 2017 17:15 )             41.2     08-20    141  |  98  |  16.0  ----------------------------<  65<L>  4.3   |  16.0<L>  |  0.54    Ca    9.8      20 Aug 2017 12:36    TPro  8.4  /  Alb  5.1  /  TBili  0.6  /  DBili  x   /  AST  51<H>  /  ALT  17  /  AlkPhos  148<H>  08-20 21 year old male smoker w/ PMH of Gastritis & Substance abuse brought to ED  for acute onset of abdominal pain and admitted for intractable abdominal pain and N/V.     Progress Note:    Patient seen and examined at bedside. BM, Voiding  Patient denies current fevers, chills, n/v/, SOB, chest pain, leg swelling.     MEDICATIONS  (STANDING):  pantoprazole  Injectable 40 milliGRAM(s) IV Push every 12 hours  enoxaparin Injectable 40 milliGRAM(s) SubCutaneous every 24 hours  cefTRIAXone   IVPB   IV Intermittent   sodium chloride 0.9%. 1000 milliLiter(s) (125 mL/Hr) IV Continuous <Continuous>  ondansetron Injectable 4 milliGRAM(s) IV Push every 6 hours  lactobacillus acidophilus 1 Tablet(s) Oral two times a day with meals  cefTRIAXone   IVPB 1 Gram(s) IV Intermittent every 24 hours  sucralfate 1 Gram(s) Oral every 6 hours       Vital Signs Last 24 Hrs  T(C): 37.1 (21 Aug 2017 05:01), Max: 37.8 (20 Aug 2017 19:38)  T(F): 98.8 (21 Aug 2017 05:01), Max: 100 (20 Aug 2017 19:38)  HR: 84 (21 Aug 2017 05:01) (74 - 116)  BP: 138/69 (21 Aug 2017 05:01) (114/56 - 140/77)  RR: 22 (21 Aug 2017 05:01) (22 - 23)  SpO2: 96% (21 Aug 2017 05:01) (96% - 100%)      PE:  22 y/o sleeping in bed, in NAD,   HEENT: NCAT, PERRL, normal conjunctivae  Lungs: CTA, no rales. rhonchi, wheezes  Heart: RRR, no murmurs, rubs, gallops  Abdomen: +BS x 4 quadrants, non-tender, non-distended  Extremities: peripheral pulses intact, no edema or cyanosis  Neuro: Patient currently sleeping and non-responsive to verbal or tactile stimulus  Skin: bruises in medial aspect of legs b/l                          14.8   19.8  )-----------( 259      ( 20 Aug 2017 17:15 )             41.2     08-20    141  |  98  |  16.0  ----------------------------<  65<L>  4.3   |  16.0<L>  |  0.54    Ca    9.8      20 Aug 2017 12:36    TPro  8.4  /  Alb  5.1  /  TBili  0.6  /  DBili  x   /  AST  51<H>  /  ALT  17  /  AlkPhos  148<H>      Urinalysis Basic - ( 21 Aug 2017 01:15 )    Color: Yellow / Appearance: Clear / S.020 / pH: x  Gluc: x / Ketone: Large  / Bili: Negative / Urobili: Negative mg/dL   Blood: x / Protein: Negative mg/dL / Nitrite: Negative   Leuk Esterase: Negative / RBC: x / WBC x   Sq Epi: x / Non Sq Epi: x / Bacteria: x    IMAGING:      < from: CT Abdomen and Pelvis No Cont (17 @ 15:19) >     EXAM:  CT ABDOMEN AND PELVIS                          PROCEDURE DATE:  2017     INTERPRETATION:  Clinical information: Abdominal pain and distention,   possible obstruction    Comparison: 6/3    PROCEDURE:     CT of the Abdomen and Pelvis was performed without intravenous contrast.  IMPRESSION:     Distended urinary bladder, correlate for urinary retention.    Otherwise no acute findings provided the limitation of a noncontrast   exam. No bowel obstruction. 21 year old male smoker w/ PMH of Gastritis & Substance abuse brought to ED  for acute onset of abdominal pain and admitted for intractable abdominal pain and N/V.     Progress Note:    Patient seen and examined at bedside. No acute events ON per nurse, pt voiding OK.  Patient still complaining of abdominal pain, 8-9/10 currently reduced at rest, patient states pain is located diffusely around the abdomen, worse on the left side, aggravated after eating and when he stands up, patient denies any association with walking or exercise. Patient requesting dilaudid for sever pain this morning. It was explained to patient that he had     Patient denies current fevers, chills, n/v/, SOB, chest pain, leg swelling.     MEDICATIONS  (STANDING):  pantoprazole  Injectable 40 milliGRAM(s) IV Push every 12 hours  enoxaparin Injectable 40 milliGRAM(s) SubCutaneous every 24 hours  cefTRIAXone   IVPB   IV Intermittent   sodium chloride 0.9%. 1000 milliLiter(s) (125 mL/Hr) IV Continuous <Continuous>  ondansetron Injectable 4 milliGRAM(s) IV Push every 6 hours  lactobacillus acidophilus 1 Tablet(s) Oral two times a day with meals  cefTRIAXone   IVPB 1 Gram(s) IV Intermittent every 24 hours  sucralfate 1 Gram(s) Oral every 6 hours       Vital Signs Last 24 Hrs  T(C): 37.1 (21 Aug 2017 05:01), Max: 37.8 (20 Aug 2017 19:38)  T(F): 98.8 (21 Aug 2017 05:01), Max: 100 (20 Aug 2017 19:38)  HR: 84 (21 Aug 2017 05:01) (74 - 116)  BP: 138/69 (21 Aug 2017 05:01) (114/56 - 140/77)  RR: 22 (21 Aug 2017 05:01) (22 - 23)  SpO2: 96% (21 Aug 2017 05:01) (96% - 100%)      PE:  20 y/o sleeping in bed, in NAD,   HEENT: NCAT, PERRL, normal conjunctivae  Lungs: CTA, no rales. rhonchi, wheezes  Heart: RRR, no murmurs, rubs, gallops  Abdomen: +BS x 4 quadrants, non-tender, non-distended  Extremities: peripheral pulses intact, no edema or cyanosis  Neuro: Patient currently sleeping and non-responsive to verbal or tactile stimulus  Skin: bruises in medial aspect of legs b/l                          14.8   19.8  )-----------( 259      ( 20 Aug 2017 17:15 )             41.2         141  |  98  |  16.0  ----------------------------<  65<L>  4.3   |  16.0<L>  |  0.54    Ca    9.8      20 Aug 2017 12:36    TPro  8.4  /  Alb  5.1  /  TBili  0.6  /  DBili  x   /  AST  51<H>  /  ALT  17  /  AlkPhos  148<H>      Urinalysis Basic - ( 21 Aug 2017 01:15 )    Color: Yellow / Appearance: Clear / S.020 / pH: x  Gluc: x / Ketone: Large  / Bili: Negative / Urobili: Negative mg/dL   Blood: x / Protein: Negative mg/dL / Nitrite: Negative   Leuk Esterase: Negative / RBC: x / WBC x   Sq Epi: x / Non Sq Epi: x / Bacteria: x    IMAGING:      < from: CT Abdomen and Pelvis No Cont (17 @ 15:19) >     EXAM:  CT ABDOMEN AND PELVIS                          PROCEDURE DATE:  2017     INTERPRETATION:  Clinical information: Abdominal pain and distention,   possible obstruction    Comparison: 6/3    PROCEDURE:     CT of the Abdomen and Pelvis was performed without intravenous contrast.  IMPRESSION:     Distended urinary bladder, correlate for urinary retention.    Otherwise no acute findings provided the limitation of a noncontrast   exam. No bowel obstruction.

## 2017-08-21 NOTE — PROGRESS NOTE ADULT - PROBLEM SELECTOR PLAN 4
- IVF NS 75ML/hr continuous  - f/u Utox, alcohol level - IVF NS 75ML/hr continuous  - +ve for THC, cocaine, and opiates, alcohol level  - f/u CK level

## 2017-08-21 NOTE — PROGRESS NOTE ADULT - PROBLEM SELECTOR PLAN 5
- patient with elevated Alk Phos, mildly elevated AST, similar to previous admission  - CT abdomen 6/2017 showing fatty liver  - previous work-up for liver pathology including Hep serology were -ve  - chronic elevation, f/u with GI as outpatient

## 2017-08-22 VITALS
TEMPERATURE: 98 F | HEART RATE: 98 BPM | RESPIRATION RATE: 18 BRPM | OXYGEN SATURATION: 99 % | DIASTOLIC BLOOD PRESSURE: 82 MMHG | SYSTOLIC BLOOD PRESSURE: 116 MMHG

## 2017-08-22 LAB
ANION GAP SERPL CALC-SCNC: 16 MMOL/L — SIGNIFICANT CHANGE UP (ref 5–17)
BASOPHILS # BLD AUTO: 0 K/UL — SIGNIFICANT CHANGE UP (ref 0–0.2)
BASOPHILS NFR BLD AUTO: 0.1 % — SIGNIFICANT CHANGE UP (ref 0–2)
BUN SERPL-MCNC: 8 MG/DL — SIGNIFICANT CHANGE UP (ref 8–20)
CALCIUM SERPL-MCNC: 8.4 MG/DL — LOW (ref 8.6–10.2)
CHLORIDE SERPL-SCNC: 96 MMOL/L — LOW (ref 98–107)
CO2 SERPL-SCNC: 24 MMOL/L — SIGNIFICANT CHANGE UP (ref 22–29)
CREAT SERPL-MCNC: 0.47 MG/DL — LOW (ref 0.5–1.3)
CULTURE RESULTS: SIGNIFICANT CHANGE UP
EOSINOPHIL # BLD AUTO: 0 K/UL — SIGNIFICANT CHANGE UP (ref 0–0.5)
EOSINOPHIL NFR BLD AUTO: 0.1 % — SIGNIFICANT CHANGE UP (ref 0–5)
GLUCOSE SERPL-MCNC: 93 MG/DL — SIGNIFICANT CHANGE UP (ref 70–115)
HCT VFR BLD CALC: 39.4 % — LOW (ref 42–52)
HGB BLD-MCNC: 13.6 G/DL — LOW (ref 14–18)
LYMPHOCYTES # BLD AUTO: 1.9 K/UL — SIGNIFICANT CHANGE UP (ref 1–4.8)
LYMPHOCYTES # BLD AUTO: 23.1 % — SIGNIFICANT CHANGE UP (ref 20–55)
MAGNESIUM SERPL-MCNC: 2 MG/DL — SIGNIFICANT CHANGE UP (ref 1.6–2.6)
MCHC RBC-ENTMCNC: 32.2 PG — HIGH (ref 27–31)
MCHC RBC-ENTMCNC: 34.5 G/DL — SIGNIFICANT CHANGE UP (ref 32–36)
MCV RBC AUTO: 93.4 FL — SIGNIFICANT CHANGE UP (ref 80–94)
MONOCYTES # BLD AUTO: 0.8 K/UL — SIGNIFICANT CHANGE UP (ref 0–0.8)
MONOCYTES NFR BLD AUTO: 10.4 % — HIGH (ref 3–10)
NEUTROPHILS # BLD AUTO: 5.3 K/UL — SIGNIFICANT CHANGE UP (ref 1.8–8)
NEUTROPHILS NFR BLD AUTO: 66.1 % — SIGNIFICANT CHANGE UP (ref 37–73)
PHOSPHATE SERPL-MCNC: 2.3 MG/DL — LOW (ref 2.4–4.7)
PLATELET # BLD AUTO: 226 K/UL — SIGNIFICANT CHANGE UP (ref 150–400)
POTASSIUM SERPL-MCNC: 3.3 MMOL/L — LOW (ref 3.5–5.3)
POTASSIUM SERPL-SCNC: 3.3 MMOL/L — LOW (ref 3.5–5.3)
RBC # BLD: 4.22 M/UL — LOW (ref 4.6–6.2)
RBC # FLD: 13.1 % — SIGNIFICANT CHANGE UP (ref 11–15.6)
SODIUM SERPL-SCNC: 136 MMOL/L — SIGNIFICANT CHANGE UP (ref 135–145)
SPECIMEN SOURCE: SIGNIFICANT CHANGE UP
WBC # BLD: 8.1 K/UL — SIGNIFICANT CHANGE UP (ref 4.8–10.8)
WBC # FLD AUTO: 8.1 K/UL — SIGNIFICANT CHANGE UP (ref 4.8–10.8)

## 2017-08-22 PROCEDURE — 99239 HOSP IP/OBS DSCHRG MGMT >30: CPT

## 2017-08-22 RX ORDER — BUPRENORPHINE AND NALOXONE 2; .5 MG/1; MG/1
2 TABLET SUBLINGUAL ONCE
Qty: 0 | Refills: 0 | Status: DISCONTINUED | OUTPATIENT
Start: 2017-08-22 | End: 2017-08-22

## 2017-08-22 RX ADMIN — MORPHINE SULFATE 4 MILLIGRAM(S): 50 CAPSULE, EXTENDED RELEASE ORAL at 02:00

## 2017-08-22 RX ADMIN — MORPHINE SULFATE 4 MILLIGRAM(S): 50 CAPSULE, EXTENDED RELEASE ORAL at 09:48

## 2017-08-22 RX ADMIN — Medication 1 TABLET(S): at 12:37

## 2017-08-22 RX ADMIN — Medication 63.75 MILLIMOLE(S): at 09:37

## 2017-08-22 RX ADMIN — MORPHINE SULFATE 4 MILLIGRAM(S): 50 CAPSULE, EXTENDED RELEASE ORAL at 05:48

## 2017-08-22 RX ADMIN — Medication 1 TABLET(S): at 09:37

## 2017-08-22 RX ADMIN — Medication 1 GRAM(S): at 05:50

## 2017-08-22 RX ADMIN — ONDANSETRON 4 MILLIGRAM(S): 8 TABLET, FILM COATED ORAL at 04:16

## 2017-08-22 RX ADMIN — Medication 650 MILLIGRAM(S): at 02:42

## 2017-08-22 RX ADMIN — MORPHINE SULFATE 4 MILLIGRAM(S): 50 CAPSULE, EXTENDED RELEASE ORAL at 06:05

## 2017-08-22 RX ADMIN — BUPRENORPHINE AND NALOXONE 2 TABLET(S): 2; .5 TABLET SUBLINGUAL at 13:59

## 2017-08-22 RX ADMIN — Medication 650 MILLIGRAM(S): at 01:42

## 2017-08-22 RX ADMIN — CEFTRIAXONE 100 GRAM(S): 500 INJECTION, POWDER, FOR SOLUTION INTRAMUSCULAR; INTRAVENOUS at 00:14

## 2017-08-22 RX ADMIN — Medication 10 MILLIGRAM(S): at 05:50

## 2017-08-22 RX ADMIN — Medication 1 TABLET(S): at 00:15

## 2017-08-22 RX ADMIN — Medication 1 GRAM(S): at 00:40

## 2017-08-22 RX ADMIN — MORPHINE SULFATE 4 MILLIGRAM(S): 50 CAPSULE, EXTENDED RELEASE ORAL at 10:48

## 2017-08-22 RX ADMIN — BUPRENORPHINE AND NALOXONE 2 TABLET(S): 2; .5 TABLET SUBLINGUAL at 12:37

## 2017-08-22 RX ADMIN — MORPHINE SULFATE 4 MILLIGRAM(S): 50 CAPSULE, EXTENDED RELEASE ORAL at 01:41

## 2017-08-22 RX ADMIN — PANTOPRAZOLE SODIUM 40 MILLIGRAM(S): 20 TABLET, DELAYED RELEASE ORAL at 05:49

## 2017-08-22 RX ADMIN — Medication 10 MILLIGRAM(S): at 00:15

## 2017-08-22 NOTE — PROGRESS NOTE ADULT - PROBLEM SELECTOR PROBLEM 4
Metabolic acidosis with increased anion gap and accumulation of organic acids
Metabolic acidosis with increased anion gap and accumulation of organic acids

## 2017-08-22 NOTE — CHART NOTE - NSCHARTNOTEFT_GEN_A_CORE
Pt wants to live AMA I personally and Dr Nunn spoke with the pt and his father , also his nurse was present during the conversation , pt understood risk and benefits associated with alcohol/ opioids withdraw even dead , I personally will call the pt every 6-12 hrs to make sure he doesn't have any sx. Before to live AMA pt was asymptomatic , HR=90 bmp, /82 mmhg, RR= 18 , O2 sat 99 % on room air. I also explained to him that he should see her PMD tomorrow early in the morning , in case of any sx such as sweating , palpitations, chest pain , sob , tremor, anxiety he should go to the closest ED.

## 2017-08-22 NOTE — PROGRESS NOTE ADULT - PROBLEM SELECTOR PLAN 3
- Patient with leukocytosis, elevated lactate, urinary retention initially on admission, suspicious for possible UTI vs other infection  - will treat prophylactically with Ceftriaxone 1 g IV daily  - UA -ve, will f/u UCx, and blood Cx for evaluate for infectious source - Patient with leukocytosis, elevated lactate, urinary retention initially on admission, suspicious for possible UTI vs other infection  - will treat prophylactically with Ceftriaxone 1 g IV daily started 08/20/17  - UA -ve, will f/u UCx, and blood Cx for evaluate for infectious source

## 2017-08-22 NOTE — PROGRESS NOTE ADULT - PROBLEM SELECTOR PLAN 5
- patient with elevated Alk Phos, mildly elevated AST, similar to previous admission  - CT abdomen 6/2017 showing fatty liver  - previous work-up for liver pathology including Hep serology were -ve  - chronic elevation, f/u with GI as outpatient - patient with elevated Alk Phos, mildly elevated AST, similar to previous admission  - CT abdomen 6/2017 showing fatty liver  - previous work-up for liver pathology including Hep serology were -ve  - chronic elevation, f/u with GI as outpatient  Pending ACb CT scan with contrast

## 2017-08-22 NOTE — PROGRESS NOTE ADULT - PROBLEM SELECTOR PLAN 1
- likely gastritis/reflux esophagitis  - patient with extensive GI work-up on previous admissions for similar complaints, further work-up as recommended per GI  - will keep NPO and advance diet as tolerated,  ml/hr NS continuous  - Protonix 40 IV BID, Carafate 1mg PO Q6hrs  - Zofran 4mg q 6hrs for vomiting  - will consider GI consult if pain does not remove - likely gastritis/reflux esophagitis  - patient with extensive GI work-up on previous admissions for similar complaints, further work-up as recommended per GI  - will keep NPO and advance diet as tolerated,  ml/hr NS continuous  - Protonix 40 IV BID, Carafate 1mg PO Q6hrs  - Zofran 4mg q 6hrs for vomiting  -Abdominal CT scan reports only distende urinary blader, but pt mentions is voiding  -Abdominal contrast CT scan done, pending report.  - will consider GI consult if pain does not remove

## 2017-08-22 NOTE — PROGRESS NOTE ADULT - SUBJECTIVE AND OBJECTIVE BOX
21 year old male smoker w/ PMH of Gastritis & Substance abuse brought to ED  for acute onset of abdominal pain and admitted for intractable abdominal pain and N/V.     Progress Note:    Patient seen and examined at bedside. No acute events ON per nurse, pt voiding OK.  Patient still complaining of abdominal pain, 8-9/10 currently reduced at rest, patient states pain is located diffusely around the abdomen, worse on the left side, aggravated after eating and when he stands up, patient denies any association with walking or exercise. Patient requesting dilaudid for sever pain this morning. It was explained to patient that he had     Patient denies current fevers, chills, n/v/, SOB, chest pain, leg swelling.     MEDICATIONS  (STANDING):  pantoprazole  Injectable 40 milliGRAM(s) IV Push every 12 hours  enoxaparin Injectable 40 milliGRAM(s) SubCutaneous every 24 hours  cefTRIAXone   IVPB   IV Intermittent   sodium chloride 0.9%. 1000 milliLiter(s) (125 mL/Hr) IV Continuous <Continuous>  ondansetron Injectable 4 milliGRAM(s) IV Push every 6 hours  lactobacillus acidophilus 1 Tablet(s) Oral two times a day with meals  cefTRIAXone   IVPB 1 Gram(s) IV Intermittent every 24 hours  sucralfate 1 Gram(s) Oral every 6 hours  metoclopramide Injectable 10 milliGRAM(s) IV Push every 8 hours  potassium acid phosphate/sodium acid phosphate tablet (K-PHOS No. 2) 1 Tablet(s) Oral four times a day with meals  sodium phosphate IVPB 15 milliMole(s) IV Intermittent once    MEDICATIONS  (PRN):  morphine  - Injectable 2 milliGRAM(s) IV Push every 4 hours PRN Moderate Pain (4 - 6)  morphine  - Injectable 4 milliGRAM(s) IV Push every 4 hours PRN Severe Pain (7 - 10)  LORazepam     Tablet 2 milliGRAM(s) Oral every 1 hour PRN CIWA-Ar score 8 or greater  acetaminophen    Suspension. 650 milliGRAM(s) Oral every 6 hours PRN Mild Pain (1 - 3)        Vital Signs Last 24 Hrs  T(C): 37.1 (22 Aug 2017 05:10), Max: 37.8 (21 Aug 2017 12:42)  T(F): 98.8 (22 Aug 2017 05:10), Max: 100 (21 Aug 2017 12:42)  HR: 97 (22 Aug 2017 05:10) (84 - 105)  BP: 119/70 (22 Aug 2017 05:10) (108/57 - 133/84)  BP(mean): --  RR: 16 (22 Aug 2017 05:10) (16 - 24)  SpO2: 99% (22 Aug 2017 05:10) (96% - 99%)          PE:  22 y/o sleeping in bed, in NAD,   HEENT: NCAT, PERRL, normal conjunctivae  Lungs: CTA, no rales. rhonchi, wheezes  Heart: RRR, no murmurs, rubs, gallops  Abdomen: +BS x 4 quadrants, non-tender, non-distended  Extremities: peripheral pulses intact, no edema or cyanosis  Neuro: Patient currently sleeping and non-responsive to verbal or tactile stimulus  Skin: bruises in medial aspect of legs b/l                              14.4   16.4  )-----------( 279      ( 21 Aug 2017 08:04 )             41.3         136  |  98  |  10.0  ----------------------------<  103  3.8   |  22.0  |  0.52    Ca    8.6      21 Aug 2017 13:33  Phos  2.0       Mg     2.1         TPro  6.7  /  Alb  4.2  /  TBili  0.8  /  DBili  0.2  /  AST  44<H>  /  ALT  18  /  AlkPhos  113            Urinalysis Basic - ( 21 Aug 2017 01:15 )    Color: Yellow / Appearance: Clear / S.020 / pH: x  Gluc: x / Ketone: Large  / Bili: Negative / Urobili: Negative mg/dL   Blood: x / Protein: Negative mg/dL / Nitrite: Negative   Leuk Esterase: Negative / RBC: x / WBC x   Sq Epi: x / Non Sq Epi: x / Bacteria: x        Opiate, Urine (17 @ 01:15)    Opiate, Urine: Positive    Cocaine Metabolite, Urine (17 @ 01:15)    Cocaine Metabolite, Urine: Positive    THC, Urine Qualitative (17 @ 01:15)    THC, Urine Qualitative: Positive        IMAGING:      < from: CT Abdomen and Pelvis No Cont (17 @ 15:19) >     EXAM:  CT ABDOMEN AND PELVIS                          PROCEDURE DATE:  2017     INTERPRETATION:  Clinical information: Abdominal pain and distention,   possible obstruction    Comparison: 6/3    PROCEDURE:     CT of the Abdomen and Pelvis was performed without intravenous contrast.  IMPRESSION:     Distended urinary bladder, correlate for urinary retention.    Otherwise no acute findings provided the limitation of a noncontrast   exam. No bowel obstruction. 21 year old male smoker w/ PMH of Gastritis & Substance abuse brought to ED  for acute onset of abdominal pain and admitted for intractable abdominal pain and N/V.       17: Patient seen and examined at bedside. Patient still complaining of abdominal pain, 8/10 currently reduced at rest, patient states pain is located diffusely around the abdomen, worse on the left side, aggravated after eating and when he stands up, patient denies any association with walking or exercise. Patient requesting dilaudid for sever pain this morning again, mentions that he has been in the hospital several times and that is the only medication that works for him. CT scan reports only distended urinary blader. It was explained to patient that he was positive for some substances and most likely he is on withdrawal, and that we are also waiting for his Abdomen contrast CT scan report. No acute events ON per nurse. Pt mentions he is been voiding, and last BM was on .        ROS: Patient denies current fevers, chills, n/v/, SOB, chest pain, leg swelling.         MEDICATIONS  (STANDING):  pantoprazole  Injectable 40 milliGRAM(s) IV Push every 12 hours  enoxaparin Injectable 40 milliGRAM(s) SubCutaneous every 24 hours  cefTRIAXone   IVPB   IV Intermittent   sodium chloride 0.9%. 1000 milliLiter(s) (125 mL/Hr) IV Continuous <Continuous>  ondansetron Injectable 4 milliGRAM(s) IV Push every 6 hours  lactobacillus acidophilus 1 Tablet(s) Oral two times a day with meals  cefTRIAXone   IVPB 1 Gram(s) IV Intermittent every 24 hours  sucralfate 1 Gram(s) Oral every 6 hours  metoclopramide Injectable 10 milliGRAM(s) IV Push every 8 hours  potassium acid phosphate/sodium acid phosphate tablet (K-PHOS No. 2) 1 Tablet(s) Oral four times a day with meals  sodium phosphate IVPB 15 milliMole(s) IV Intermittent once    MEDICATIONS  (PRN):  morphine  - Injectable 2 milliGRAM(s) IV Push every 4 hours PRN Moderate Pain (4 - 6)  morphine  - Injectable 4 milliGRAM(s) IV Push every 4 hours PRN Severe Pain (7 - 10)  LORazepam     Tablet 2 milliGRAM(s) Oral every 1 hour PRN CIWA-Ar score 8 or greater  acetaminophen    Suspension. 650 milliGRAM(s) Oral every 6 hours PRN Mild Pain (1 - 3)        Vital Signs Last 24 Hrs  T(C): 37.1 (22 Aug 2017 05:10), Max: 37.8 (21 Aug 2017 12:42)  T(F): 98.8 (22 Aug 2017 05:10), Max: 100 (21 Aug 2017 12:42)  HR: 97 (22 Aug 2017 05:10) (84 - 105)  BP: 119/70 (22 Aug 2017 05:10) (108/57 - 133/84)  BP(mean): --  RR: 16 (22 Aug 2017 05:10) (16 - 24)  SpO2: 99% (22 Aug 2017 05:10) (96% - 99%)            PE:  22 y/o sleeping in bed, in NAD,   HEENT: NCAT, PERRL, normal conjunctivae  Lungs: CTA, no rales. rhonchi, wheezes  Heart: RRR, no murmurs, rubs, gallops  Abdomen: +BS x 4 quadrants, diffuse tenderness, non-distended, no rebound tenderness.  Extremities: peripheral pulses intact, no edema or cyanosis  Neuro: Patient currently sleeping and non-responsive to verbal or tactile stimulus  Skin: bruises in medial aspect of legs b/l                              14.4   16.4  )-----------( 279      ( 21 Aug 2017 08:04 )             41.3           136  |  98  |  10.0  ----------------------------<  103  3.8   |  22.0  |  0.52    Ca    8.6      21 Aug 2017 13:33  Phos  2.0       Mg     2.1         TPro  6.7  /  Alb  4.2  /  TBili  0.8  /  DBili  0.2  /  AST  44<H>  /  ALT  18  /  AlkPhos  113                   Urinalysis Basic - ( 21 Aug 2017 01:15 )    Color: Yellow / Appearance: Clear / S.020 / pH: x  Gluc: x / Ketone: Large  / Bili: Negative / Urobili: Negative mg/dL   Blood: x / Protein: Negative mg/dL / Nitrite: Negative   Leuk Esterase: Negative / RBC: x / WBC x   Sq Epi: x / Non Sq Epi: x / Bacteria: x        Opiate, Urine (17 @ 01:15)    Opiate, Urine: Positive    Cocaine Metabolite, Urine (17 @ 01:15)    Cocaine Metabolite, Urine: Positive    THC, Urine Qualitative (17 @ 01:15)    THC, Urine Qualitative: Positive        IMAGING:      < from: CT Abdomen and Pelvis No Cont (17 @ 15:19) >     EXAM:  CT ABDOMEN AND PELVIS                          PROCEDURE DATE:  2017     INTERPRETATION:  Clinical information: Abdominal pain and distention,   possible obstruction    Comparison: 6/3    PROCEDURE:     CT of the Abdomen and Pelvis was performed without intravenous contrast.  IMPRESSION:     Distended urinary bladder, correlate for urinary retention.    Otherwise no acute findings provided the limitation of a noncontrast   exam. No bowel obstruction.

## 2017-08-22 NOTE — PROGRESS NOTE ADULT - ASSESSMENT
21 year old male smoker w/ PMH of Gastritis & Substance abuse (active marijuana use) s/p cholecystectomy, prior multiple ER visits and recent hospitalization in 6/17 for abd pain/N/V (over last 3 years) and completed GI work up with negative colonoscopy 1 yr ago currently brought to ED  for acute onset of abdominal pain and admitted for intractable abdominal pain and N/V.       08/22/17:     Abd contrasted CT scan performed, waiting for results 21 year old male smoker w/ PMH of Gastritis & Substance abuse (active marijuana use) s/p cholecystectomy, prior multiple ER visits and recent hospitalization in 6/17 for abd pain/N/V (over last 3 years) and completed GI work up with negative colonoscopy 1 yr ago currently brought to ED  for acute onset of abdominal pain and admitted for intractable abdominal pain and N/V.       08/22/17: Patient seen and examined at bedside. Patient still complaining of abdominal pain, 8/10 currently reduced at rest, patient states pain is located diffusely around the abdomen, worse on the left side, aggravated after eating and when he stands up, patient denies any association with walking or exercise. Patient requesting dilaudid for sever pain this morning again, mentions that he has been in the hospital several times and that is the only medication that works for him. CT scan reports only distended urinary blader. It was explained to patient that he was positive for some substances and most likely he is on withdrawal, and that we are also waiting for his Abdomen contrast CT scan report. No acute events ON per nurse. Pt mentions he is been voiding with some burning during urination but denies hematuria, polyuria, urinary discharge u other symptoms. Pt is on Ceftriaxone D2. UA done yesterday, positive only for ketones and occasional bacteria but no nitrites or LKE. Last BM was on sunday. Today CIWA score: 4. Urine culture and blood culture pending. 21 year old male smoker w/ PMH of Gastritis & Substance abuse (active marijuana use) s/p cholecystectomy, prior multiple ER visits and recent hospitalization in 6/17 for abd pain/N/V (over last 3 years) and completed GI work up with negative colonoscopy 1 yr ago currently brought to ED  for acute onset of abdominal pain and admitted for intractable abdominal pain and N/V.       08/22/17: Patient seen and examined at bedside. Patient still complaining of abdominal pain, 8/10 currently reduced at rest, patient states pain is located diffusely around the abdomen, worse on the left side, aggravated after eating and when he stands up, patient denies any association with walking or exercise. Patient requesting dilaudid for sever pain this morning again, mentions that he has been in the hospital several times and that is the only medication that works for him. CT scan reports only distended urinary blader. It was explained to patient that he was positive for some substances and most likely he is on withdrawal, and that we are also waiting for his Abdomen contrast CT scan report. No acute events ON per nurse and per monitor reported. Pt mentions he is been voiding with some burning during urination but denies hematuria, polyuria, urinary discharge u other symptoms. Pt is on Ceftriaxone D2. UA done yesterday, positive only for ketones and occasional bacteria but no nitrites or LKE. Last BM was on sunday. Today CIWA score: 4. Urine culture and blood culture pending.

## 2017-08-22 NOTE — PROGRESS NOTE ADULT - PROBLEM SELECTOR PLAN 2
- urine toxicology +ve for THC, cocaine, and opiates  - patient with history of substance abuse, social work consult  - patient agitated in the ED, kicking the equipment, pulling IV, given 5 mg haloperidol and 2mg Ativan in the ED  - consider psych consult if patient remains agitated - urine toxicology +ve for THC, cocaine, and opiates  - patient with history of substance abuse, social work consult  - patient agitated in the ED, kicking the equipment, pulling IV, given 5 mg haloperidol and 2mg Ativan in the ED  - consider psych consult if patient remains agitated  Today CIWA score: 4

## 2017-08-23 NOTE — CHART NOTE - NSCHARTNOTEFT_GEN_A_CORE
I spoke with the pt this night as part of his follow up after him leave AMA, pt report that his abdominal pain is much better , he denies any sx associated with opioid or alcohol withdraw , he is living with his parents. No sweating , no anxiety , no tremor, denies n/v, no palpitation .Breve counseling was given.  Will continue following the pt I spoke with the pt this night as part of his follow up after him leave AMA, pt report that his abdominal pain is much better , he denies any sx associated with opioid or alcohol withdraw , he is living with his parents. No sweating , no anxiety , no tremor, denies n/v, no palpitation .Breve counseling was given.  Will continue following the pt    Agree with above, pt signed AMA and clearly understands risk of death by leaving.  Lorena Bales MD

## 2017-08-24 LAB — LEAD BLD-MCNC: 1 UG/DL — SIGNIFICANT CHANGE UP (ref 0–19)

## 2017-08-25 LAB
CULTURE RESULTS: SIGNIFICANT CHANGE UP
CULTURE RESULTS: SIGNIFICANT CHANGE UP
SPECIMEN SOURCE: SIGNIFICANT CHANGE UP
SPECIMEN SOURCE: SIGNIFICANT CHANGE UP

## 2018-01-09 NOTE — ED PROVIDER NOTE - MEDICAL DECISION MAKING DETAILS
FYI - all but 1 fasting BG are elevated with an average of 186. He is due to see you before the end of the month and will be making that appointment. Would recommend he begin Metformin at this time. He is interested in the ER version.  Natasha Garcia, SANAM LD CDE 
abdominal pain with history of pancreatitis and gastritis; ct scan , labs, and iv fluids ; reeval
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
## 2018-09-19 NOTE — ED PROVIDER NOTE - NS ED MD DISPO SPECIAL CONSIDERATION1
I, Sukhdeep Castellanos MD, personally saw the patient with the resident, and completed the key components of the history and physical exam. I then discussed the management plan with the resident.    21 y/o M w/ hx SCD (Hx of acute chest) pw CP.  Pt notes 1 day worsening generalized CP.  States feels like prior SC pain crisis.  Denies fever, cough, SOB, AP, n/v or further complaints.    ***GEN - NAD; well appearing; A+O x3 ***HEAD - NC/AT   ***PULMONARY - CTA b/l, symmetric breath sounds. ***CARDIAC -s1s2, RRR, no M,G,R  ***ABDOMEN - ND, NT, soft, no guarding, no rebound, no masses    ***SKIN - no rash or bruising   ***NEUROLOGIC - alert and oriented, follows commands, sensation nl, motor nl, ***PSYCH - insight and judgment nl, memory nl, affect nl, thought nl    Pt w/ CP, hx acute chest and borderline hypoxia.  likely admit for observation
None

## 2018-11-28 NOTE — PROGRESS NOTE ADULT - PROBLEM SELECTOR PLAN 7
Initial Anesthesia Post-op Note    Patient: Lamonte Guajardo  Procedure(s) Performed: CONVERTED TO OPEN SIGMOID COLON RESECTION  Anesthesia type: General    Vital Last Value   Temperature 37 °C (98.6 °F) (11/28/18 0841)   Pulse 90 (11/28/18 0841)   Respiratory Rate 21 (11/28/18 0841)   Non-Invasive   Blood Pressure 135/76 (11/28/18 0924)   Arterial  Blood Pressure     Pulse Oximetry 94 % (11/28/18 0841)     Last 24 I/O:     Intake/Output Summary (Last 24 hours) at 11/28/2018 1353  Last data filed at 11/28/2018 1332  Gross per 24 hour   Intake 1350 ml   Output 50 ml   Net 1300 ml       PATIENT LOCATION: PACU Phase 1  POST-OP VITAL SIGNS: stable  LEVEL OF CONSCIOUSNESS: awake  RESPIRATORY STATUS: spontaneous ventilation  CARDIOVASCULAR: blood pressure returned to baseline  HYDRATION: euvolemic    PAIN MANAGEMENT: adequately controlled  NAUSEA: None  AIRWAY PATENCY: patent  POST-OP ASSESSMENT: patient tolerated procedure well with no complications  HANDOFF:  Handoff to receiving nurse was performed and questions were answered       Lovenox 40 mg SQ daily stable;   pending herpes, chlamydia, gonorrhea testing; stable;   pending herpes, chlamydia, gonorrhea testing;  scrotal US shows b/l testicular microlithiasis; stable;   pending herpes, chlamydia, gonorrhea testing;  scrotal US shows b/l testicular microlithiasis; Urology consult ordered; -Pt c/o scrotal swelling, pain and looks like some folliculitis on the scrotal area   -pending herpes, chlamydia, gonorrhea testing;  -scrotal US shows b/l testicular microlithiasis  -Urology consult ordered

## 2020-01-15 ENCOUNTER — EMERGENCY (EMERGENCY)
Facility: HOSPITAL | Age: 25
LOS: 1 days | Discharge: HOME | End: 2020-01-15
Attending: EMERGENCY MEDICINE
Payer: SELF-PAY

## 2020-01-15 VITALS
WEIGHT: 130.07 LBS | SYSTOLIC BLOOD PRESSURE: 103 MMHG | RESPIRATION RATE: 18 BRPM | OXYGEN SATURATION: 98 % | HEART RATE: 69 BPM | TEMPERATURE: 98 F | DIASTOLIC BLOOD PRESSURE: 66 MMHG | HEIGHT: 69 IN

## 2020-01-15 DIAGNOSIS — Z90.49 ACQUIRED ABSENCE OF OTHER SPECIFIED PARTS OF DIGESTIVE TRACT: Chronic | ICD-10-CM

## 2020-01-15 DIAGNOSIS — S52.209A UNSPECIFIED FRACTURE OF SHAFT OF UNSPECIFIED ULNA, INITIAL ENCOUNTER FOR CLOSED FRACTURE: Chronic | ICD-10-CM

## 2020-01-15 DIAGNOSIS — Z98.890 OTHER SPECIFIED POSTPROCEDURAL STATES: Chronic | ICD-10-CM

## 2020-01-15 PROBLEM — F19.10 OTHER PSYCHOACTIVE SUBSTANCE ABUSE, UNCOMPLICATED: Chronic | Status: ACTIVE | Noted: 2017-08-20

## 2020-01-15 PROBLEM — K21.0 GASTRO-ESOPHAGEAL REFLUX DISEASE WITH ESOPHAGITIS: Chronic | Status: ACTIVE | Noted: 2017-08-21

## 2020-01-15 LAB
ALBUMIN SERPL ELPH-MCNC: 4.9 G/DL — SIGNIFICANT CHANGE UP (ref 3.3–5.2)
ALP SERPL-CCNC: 112 U/L — SIGNIFICANT CHANGE UP (ref 40–120)
ALT FLD-CCNC: 7 U/L — SIGNIFICANT CHANGE UP
ANION GAP SERPL CALC-SCNC: 14 MMOL/L — SIGNIFICANT CHANGE UP (ref 5–17)
APPEARANCE UR: CLEAR — SIGNIFICANT CHANGE UP
AST SERPL-CCNC: 24 U/L — SIGNIFICANT CHANGE UP
BACTERIA # UR AUTO: NEGATIVE — SIGNIFICANT CHANGE UP
BASOPHILS # BLD AUTO: 0.01 K/UL — SIGNIFICANT CHANGE UP (ref 0–0.2)
BASOPHILS NFR BLD AUTO: 0.2 % — SIGNIFICANT CHANGE UP (ref 0–2)
BILIRUB SERPL-MCNC: 0.4 MG/DL — SIGNIFICANT CHANGE UP (ref 0.4–2)
BILIRUB UR-MCNC: NEGATIVE — SIGNIFICANT CHANGE UP
BLD GP AB SCN SERPL QL: SIGNIFICANT CHANGE UP
BUN SERPL-MCNC: 7 MG/DL — LOW (ref 8–20)
CALCIUM SERPL-MCNC: 9.9 MG/DL — SIGNIFICANT CHANGE UP (ref 8.6–10.2)
CHLORIDE SERPL-SCNC: 99 MMOL/L — SIGNIFICANT CHANGE UP (ref 98–107)
CO2 SERPL-SCNC: 24 MMOL/L — SIGNIFICANT CHANGE UP (ref 22–29)
COLOR SPEC: YELLOW — SIGNIFICANT CHANGE UP
CREAT SERPL-MCNC: 0.59 MG/DL — SIGNIFICANT CHANGE UP (ref 0.5–1.3)
DIFF PNL FLD: NEGATIVE — SIGNIFICANT CHANGE UP
EOSINOPHIL # BLD AUTO: 0.04 K/UL — SIGNIFICANT CHANGE UP (ref 0–0.5)
EOSINOPHIL NFR BLD AUTO: 0.7 % — SIGNIFICANT CHANGE UP (ref 0–6)
EPI CELLS # UR: NEGATIVE — SIGNIFICANT CHANGE UP
GLUCOSE SERPL-MCNC: 91 MG/DL — SIGNIFICANT CHANGE UP (ref 70–115)
GLUCOSE UR QL: NEGATIVE MG/DL — SIGNIFICANT CHANGE UP
HCT VFR BLD CALC: 44.7 % — SIGNIFICANT CHANGE UP (ref 39–50)
HGB BLD-MCNC: 15.5 G/DL — SIGNIFICANT CHANGE UP (ref 13–17)
IMM GRANULOCYTES NFR BLD AUTO: 0.2 % — SIGNIFICANT CHANGE UP (ref 0–1.5)
KETONES UR-MCNC: NEGATIVE — SIGNIFICANT CHANGE UP
LEUKOCYTE ESTERASE UR-ACNC: NEGATIVE — SIGNIFICANT CHANGE UP
LIDOCAIN IGE QN: 9 U/L — LOW (ref 22–51)
LYMPHOCYTES # BLD AUTO: 1.88 K/UL — SIGNIFICANT CHANGE UP (ref 1–3.3)
LYMPHOCYTES # BLD AUTO: 31.2 % — SIGNIFICANT CHANGE UP (ref 13–44)
MCHC RBC-ENTMCNC: 32.7 PG — SIGNIFICANT CHANGE UP (ref 27–34)
MCHC RBC-ENTMCNC: 34.7 GM/DL — SIGNIFICANT CHANGE UP (ref 32–36)
MCV RBC AUTO: 94.3 FL — SIGNIFICANT CHANGE UP (ref 80–100)
MONOCYTES # BLD AUTO: 0.24 K/UL — SIGNIFICANT CHANGE UP (ref 0–0.9)
MONOCYTES NFR BLD AUTO: 4 % — SIGNIFICANT CHANGE UP (ref 2–14)
NEUTROPHILS # BLD AUTO: 3.85 K/UL — SIGNIFICANT CHANGE UP (ref 1.8–7.4)
NEUTROPHILS NFR BLD AUTO: 63.7 % — SIGNIFICANT CHANGE UP (ref 43–77)
NITRITE UR-MCNC: NEGATIVE — SIGNIFICANT CHANGE UP
PH UR: 7 — SIGNIFICANT CHANGE UP (ref 5–8)
PLATELET # BLD AUTO: 284 K/UL — SIGNIFICANT CHANGE UP (ref 150–400)
POTASSIUM SERPL-MCNC: 4.2 MMOL/L — SIGNIFICANT CHANGE UP (ref 3.5–5.3)
POTASSIUM SERPL-SCNC: 4.2 MMOL/L — SIGNIFICANT CHANGE UP (ref 3.5–5.3)
PROT SERPL-MCNC: 7.7 G/DL — SIGNIFICANT CHANGE UP (ref 6.6–8.7)
PROT UR-MCNC: 15 MG/DL
RBC # BLD: 4.74 M/UL — SIGNIFICANT CHANGE UP (ref 4.2–5.8)
RBC # FLD: 12.1 % — SIGNIFICANT CHANGE UP (ref 10.3–14.5)
RBC CASTS # UR COMP ASSIST: NEGATIVE /HPF — SIGNIFICANT CHANGE UP (ref 0–4)
SODIUM SERPL-SCNC: 137 MMOL/L — SIGNIFICANT CHANGE UP (ref 135–145)
SP GR SPEC: 1 — LOW (ref 1.01–1.02)
UROBILINOGEN FLD QL: 1 MG/DL
WBC # BLD: 6.03 K/UL — SIGNIFICANT CHANGE UP (ref 3.8–10.5)
WBC # FLD AUTO: 6.03 K/UL — SIGNIFICANT CHANGE UP (ref 3.8–10.5)
WBC UR QL: SIGNIFICANT CHANGE UP

## 2020-01-15 PROCEDURE — 99284 EMERGENCY DEPT VISIT MOD MDM: CPT | Mod: 25

## 2020-01-15 PROCEDURE — 96375 TX/PRO/DX INJ NEW DRUG ADDON: CPT

## 2020-01-15 PROCEDURE — 96376 TX/PRO/DX INJ SAME DRUG ADON: CPT

## 2020-01-15 PROCEDURE — 74177 CT ABD & PELVIS W/CONTRAST: CPT

## 2020-01-15 PROCEDURE — 85027 COMPLETE CBC AUTOMATED: CPT

## 2020-01-15 PROCEDURE — 81001 URINALYSIS AUTO W/SCOPE: CPT

## 2020-01-15 PROCEDURE — 86850 RBC ANTIBODY SCREEN: CPT

## 2020-01-15 PROCEDURE — 74177 CT ABD & PELVIS W/CONTRAST: CPT | Mod: 26

## 2020-01-15 PROCEDURE — 96374 THER/PROPH/DIAG INJ IV PUSH: CPT | Mod: XU

## 2020-01-15 PROCEDURE — 99285 EMERGENCY DEPT VISIT HI MDM: CPT

## 2020-01-15 PROCEDURE — 86901 BLOOD TYPING SEROLOGIC RH(D): CPT

## 2020-01-15 PROCEDURE — 36415 COLL VENOUS BLD VENIPUNCTURE: CPT

## 2020-01-15 PROCEDURE — 83690 ASSAY OF LIPASE: CPT

## 2020-01-15 PROCEDURE — 86900 BLOOD TYPING SEROLOGIC ABO: CPT

## 2020-01-15 PROCEDURE — 80053 COMPREHEN METABOLIC PANEL: CPT

## 2020-01-15 RX ORDER — FAMOTIDINE 10 MG/ML
20 INJECTION INTRAVENOUS ONCE
Refills: 0 | Status: COMPLETED | OUTPATIENT
Start: 2020-01-15 | End: 2020-01-15

## 2020-01-15 RX ORDER — ONDANSETRON 8 MG/1
4 TABLET, FILM COATED ORAL ONCE
Refills: 0 | Status: COMPLETED | OUTPATIENT
Start: 2020-01-15 | End: 2020-01-15

## 2020-01-15 RX ORDER — SODIUM CHLORIDE 9 MG/ML
1000 INJECTION INTRAMUSCULAR; INTRAVENOUS; SUBCUTANEOUS ONCE
Refills: 0 | Status: COMPLETED | OUTPATIENT
Start: 2020-01-15 | End: 2020-01-15

## 2020-01-15 RX ORDER — HYDROMORPHONE HYDROCHLORIDE 2 MG/ML
0.5 INJECTION INTRAMUSCULAR; INTRAVENOUS; SUBCUTANEOUS ONCE
Refills: 0 | Status: DISCONTINUED | OUTPATIENT
Start: 2020-01-15 | End: 2020-01-15

## 2020-01-15 RX ADMIN — SODIUM CHLORIDE 1000 MILLILITER(S): 9 INJECTION INTRAMUSCULAR; INTRAVENOUS; SUBCUTANEOUS at 14:00

## 2020-01-15 RX ADMIN — HYDROMORPHONE HYDROCHLORIDE 0.5 MILLIGRAM(S): 2 INJECTION INTRAMUSCULAR; INTRAVENOUS; SUBCUTANEOUS at 13:59

## 2020-01-15 RX ADMIN — ONDANSETRON 4 MILLIGRAM(S): 8 TABLET, FILM COATED ORAL at 14:00

## 2020-01-15 RX ADMIN — FAMOTIDINE 20 MILLIGRAM(S): 10 INJECTION INTRAVENOUS at 16:21

## 2020-01-15 RX ADMIN — ONDANSETRON 4 MILLIGRAM(S): 8 TABLET, FILM COATED ORAL at 16:20

## 2020-01-15 NOTE — ED STATDOCS - CLINICAL SUMMARY MEDICAL DECISION MAKING FREE TEXT BOX
23 y/o M pt with abdominal pain x 2 days. Differential includes SBO vs appy. Will obtain labs, CT and reevaluate.

## 2020-01-15 NOTE — ED STATDOCS - PROGRESS NOTE DETAILS
pt Is seen by DR kong initially agreed with hx  PE and plan .   pt c.o still feeling nausea despite given the med . RLQ abd pain with epigastric pain when he is vomiting, labs W.o any acute finding , pending ct , ant nausea and Pepcid order Pt signed out pending abdominal ct- CT results noted with no acute concerns- pt reevaluated- reports feeling all better. Denies pain, nausea or vomiting. Pt instructed bland diet, no alcohol and pepcid daily. Pt also instructed to f/u with GI out-pt

## 2020-01-15 NOTE — ED STATDOCS - NSFOLLOWUPINSTRUCTIONS_ED_ALL_ED_FT
1. TAKE ALL MEDICATIONS AS DIRECTED.  FOR PAIN YOU CAN TAKE IBUPROFEN (MOTRIN, ADVIL) OR ACETAMINOPHEN (TYLENOL) AS NEEDED, AS DIRECTED ON PACKAGING.  2. FOLLOW UP WITH ___GI_______ AS DIRECTED.  YOU WERE GIVEN COPIES OF ALL LABS AND IMAGING RESULTS FROM YOUR ER VISIT--PLEASE TAKE THEM WITH YOU TO YOUR APPOINTMENT.  3. IF NEEDED, CALL [phone] TO FIND A PRIMARY CARE PHYSICIAN.  OR CALL [phone] TO MAKE AN APPOINTMENT WITH THE MEDICAL CLINIC.  4. RETURN TO THE ER FOR ANY WORSENING SYMPTOMS.    TAKE PEPCID DAILY    PATIENT WAS IN THE EMERGENCY DEPARTMENT MOST OF THE DAY ON 1/15/2020-PLEASE EXCUSE FROM COURT DATE    Gastritis    Gastritis is soreness and swelling (inflammation) of the lining of the stomach. Gastritis can develop as a sudden onset (acute) or long-term (chronic) condition. If gastritis is not treated, it can lead to stomach bleeding and ulcers. Causes include viral and bacterial infections, excessive alcohol consumption, tobacco use, or certain medications. Symptoms include nausea, vomiting, or abdominal pain or burning especially after eating. Avoid foods or drinks that make your symptoms worse such as caffeine, chocolate, spicy foods, acidic foods, or alcohol.    SEEK IMMEDIATE MEDICAL CARE IF YOU HAVE ANY OF THE FOLLOWING SYMPTOMS: black or bloody stools, blood or coffee-ground-colored vomitus, worsening abdominal pain, fever, or inability to keep fluids down.

## 2020-01-15 NOTE — ED STATDOCS - PSH
History of esophagogastroduodenoscopy (EGD)  6/2017 and 2016  S/P cholecystectomy    S/P cholecystectomy    S/P cholecystectomy    Ulnar fracture

## 2020-01-15 NOTE — ED STATDOCS - ATTENDING CONTRIBUTION TO CARE
I, Chinmay Franco, performed the initial face to face bedside interview with this patient regarding history of present illness, review of symptoms and relevant past medical, social and family history.  I completed an independent physical examination.  I was the initial provider who evaluated this patient. I have signed out the follow up of any pending tests (i.e. labs, radiological studies) to the ACP.  I have communicated the patient’s plan of care and disposition with the ACP.  The history, relevant review of systems, past medical and surgical history, medical decision making, and physical examination was documented by the scribe in my presence and I attest to the accuracy of the documentation.

## 2020-01-15 NOTE — ED ADULT NURSE NOTE - NSIMPLEMENTINTERV_GEN_ALL_ED
Implemented All Universal Safety Interventions:  [place]licia to call system. Call bell, personal items and telephone within reach. Instruct patient to call for assistance. Room bathroom lighting operational. Non-slip footwear when patient is off stretcher. Physically safe environment: no spills, clutter or unnecessary equipment. Stretcher in lowest position, wheels locked, appropriate side rails in place.

## 2020-01-15 NOTE — ED STATDOCS - PMH
Drug abuse    ETOH abuse    Gastritis    Gastritis    No pertinent past medical history    Pancreatitis    Reflux esophagitis

## 2020-01-15 NOTE — ED STATDOCS - CARE PROVIDER_API CALL
Carley Quevedo (DO)  Gastroenterology  [address], [address]  [place] [address]  Phone: [phone]  Fax: [phone]  Follow Up Time:

## 2020-01-15 NOTE — ED STATDOCS - OBJECTIVE STATEMENT
25 y/o M pt with significant PMHx of Gastritis and EtOH abuse presents to the ED c/o abdominal pain, x 2 days. Associated symptoms include nausea, vomiting, reduced PO intake and back pain. The emesis is described as greenish liquid. He reports that he has been unable to eat secondary to his symptoms for the past 2 days. His symptoms onset with no precipitating factors. Denies fever, CP, SOB. No further acute complaints at this time.   SHx: Cholecystectomy

## 2020-01-15 NOTE — ED ADULT NURSE NOTE - OBJECTIVE STATEMENT
Patient w/ abdominal pain, nausea and vomiting. Patient is alert and oriented x4, ambulates with steady gait, no distress at this time.

## 2020-01-15 NOTE — ED STATDOCS - PATIENT PORTAL LINK FT
You can access the FollowMyHealth Patient Portal offered by [facility] by registering at the following website: [email or web address]. By joining [employer]’s FollowMyHealth portal, you will also be able to view your health information using other applications (apps) compatible with our system.

## 2020-08-16 ENCOUNTER — OUTPATIENT (OUTPATIENT)
Dept: OUTPATIENT SERVICES | Facility: HOSPITAL | Age: 25
LOS: 1 days | End: 2020-08-16
Payer: MEDICARE

## 2020-08-16 DIAGNOSIS — Z11.59 ENCOUNTER FOR SCREENING FOR OTHER VIRAL DISEASES: ICD-10-CM

## 2020-08-16 DIAGNOSIS — Z90.49 ACQUIRED ABSENCE OF OTHER SPECIFIED PARTS OF DIGESTIVE TRACT: Chronic | ICD-10-CM

## 2020-08-16 DIAGNOSIS — Z98.890 OTHER SPECIFIED POSTPROCEDURAL STATES: Chronic | ICD-10-CM

## 2020-08-16 DIAGNOSIS — S52.209A UNSPECIFIED FRACTURE OF SHAFT OF UNSPECIFIED ULNA, INITIAL ENCOUNTER FOR CLOSED FRACTURE: Chronic | ICD-10-CM

## 2020-08-16 PROCEDURE — U0003: CPT

## 2020-08-17 LAB — SARS-COV-2 RNA SPEC QL NAA+PROBE: SIGNIFICANT CHANGE UP

## 2021-05-03 NOTE — DISCHARGE NOTE ADULT - MEDICATION SUMMARY - MEDICATIONS TO CHANGE
independent I will SWITCH the dose or number of times a day I take the medications listed below when I get home from the hospital:  None

## 2022-08-25 ENCOUNTER — EMERGENCY (EMERGENCY)
Facility: HOSPITAL | Age: 27
LOS: 1 days | Discharge: HOME | End: 2022-08-25
Attending: STUDENT IN AN ORGANIZED HEALTH CARE EDUCATION/TRAINING PROGRAM
Payer: SELF-PAY

## 2022-08-25 VITALS
RESPIRATION RATE: 18 BRPM | SYSTOLIC BLOOD PRESSURE: 124 MMHG | HEART RATE: 67 BPM | OXYGEN SATURATION: 99 % | DIASTOLIC BLOOD PRESSURE: 63 MMHG

## 2022-08-25 VITALS
TEMPERATURE: 98 F | SYSTOLIC BLOOD PRESSURE: 124 MMHG | OXYGEN SATURATION: 99 % | HEIGHT: 69 IN | DIASTOLIC BLOOD PRESSURE: 79 MMHG | RESPIRATION RATE: 18 BRPM | HEART RATE: 92 BPM

## 2022-08-25 DIAGNOSIS — Z98.890 OTHER SPECIFIED POSTPROCEDURAL STATES: Chronic | ICD-10-CM

## 2022-08-25 DIAGNOSIS — Z90.49 ACQUIRED ABSENCE OF OTHER SPECIFIED PARTS OF DIGESTIVE TRACT: Chronic | ICD-10-CM

## 2022-08-25 DIAGNOSIS — S52.209A UNSPECIFIED FRACTURE OF SHAFT OF UNSPECIFIED ULNA, INITIAL ENCOUNTER FOR CLOSED FRACTURE: Chronic | ICD-10-CM

## 2022-08-25 LAB
ALBUMIN SERPL ELPH-MCNC: 4.6 G/DL — SIGNIFICANT CHANGE UP (ref 3.3–5.2)
ALP SERPL-CCNC: 129 U/L — HIGH (ref 40–120)
ALT FLD-CCNC: 9 U/L — SIGNIFICANT CHANGE UP
ANION GAP SERPL CALC-SCNC: 17 MMOL/L — SIGNIFICANT CHANGE UP (ref 5–17)
AST SERPL-CCNC: 24 U/L — SIGNIFICANT CHANGE UP
BASOPHILS # BLD AUTO: 0.04 K/UL — SIGNIFICANT CHANGE UP (ref 0–0.2)
BASOPHILS NFR BLD AUTO: 0.2 % — SIGNIFICANT CHANGE UP (ref 0–2)
BILIRUB SERPL-MCNC: 0.5 MG/DL — SIGNIFICANT CHANGE UP (ref 0.4–2)
BUN SERPL-MCNC: 10.1 MG/DL — SIGNIFICANT CHANGE UP (ref 8–20)
CALCIUM SERPL-MCNC: 9.7 MG/DL — SIGNIFICANT CHANGE UP (ref 8.4–10.5)
CHLORIDE SERPL-SCNC: 101 MMOL/L — SIGNIFICANT CHANGE UP (ref 98–107)
CO2 SERPL-SCNC: 19 MMOL/L — LOW (ref 22–29)
CREAT SERPL-MCNC: 0.61 MG/DL — SIGNIFICANT CHANGE UP (ref 0.5–1.3)
EGFR: 134 ML/MIN/1.73M2 — SIGNIFICANT CHANGE UP
EOSINOPHIL # BLD AUTO: 0 K/UL — SIGNIFICANT CHANGE UP (ref 0–0.5)
EOSINOPHIL NFR BLD AUTO: 0 % — SIGNIFICANT CHANGE UP (ref 0–6)
GLUCOSE SERPL-MCNC: 103 MG/DL — HIGH (ref 70–99)
HCT VFR BLD CALC: 42.1 % — SIGNIFICANT CHANGE UP (ref 39–50)
HGB BLD-MCNC: 14.5 G/DL — SIGNIFICANT CHANGE UP (ref 13–17)
IMM GRANULOCYTES NFR BLD AUTO: 0.4 % — SIGNIFICANT CHANGE UP (ref 0–1.5)
LIDOCAIN IGE QN: 13 U/L — LOW (ref 22–51)
LYMPHOCYTES # BLD AUTO: 1.86 K/UL — SIGNIFICANT CHANGE UP (ref 1–3.3)
LYMPHOCYTES # BLD AUTO: 8.6 % — LOW (ref 13–44)
MCHC RBC-ENTMCNC: 31.4 PG — SIGNIFICANT CHANGE UP (ref 27–34)
MCHC RBC-ENTMCNC: 34.4 GM/DL — SIGNIFICANT CHANGE UP (ref 32–36)
MCV RBC AUTO: 91.1 FL — SIGNIFICANT CHANGE UP (ref 80–100)
MONOCYTES # BLD AUTO: 1.05 K/UL — HIGH (ref 0–0.9)
MONOCYTES NFR BLD AUTO: 4.9 % — SIGNIFICANT CHANGE UP (ref 2–14)
NEUTROPHILS # BLD AUTO: 18.54 K/UL — HIGH (ref 1.8–7.4)
NEUTROPHILS NFR BLD AUTO: 85.9 % — HIGH (ref 43–77)
PLATELET # BLD AUTO: 316 K/UL — SIGNIFICANT CHANGE UP (ref 150–400)
POTASSIUM SERPL-MCNC: 3.9 MMOL/L — SIGNIFICANT CHANGE UP (ref 3.5–5.3)
POTASSIUM SERPL-SCNC: 3.9 MMOL/L — SIGNIFICANT CHANGE UP (ref 3.5–5.3)
PROT SERPL-MCNC: 7.4 G/DL — SIGNIFICANT CHANGE UP (ref 6.6–8.7)
RBC # BLD: 4.62 M/UL — SIGNIFICANT CHANGE UP (ref 4.2–5.8)
RBC # FLD: 12.7 % — SIGNIFICANT CHANGE UP (ref 10.3–14.5)
SODIUM SERPL-SCNC: 137 MMOL/L — SIGNIFICANT CHANGE UP (ref 135–145)
T3 SERPL-MCNC: 105 NG/DL — SIGNIFICANT CHANGE UP (ref 80–200)
T4 AB SER-ACNC: 8.3 UG/DL — SIGNIFICANT CHANGE UP (ref 4.5–12)
TSH SERPL-MCNC: 0.17 UIU/ML — LOW (ref 0.27–4.2)
WBC # BLD: 21.58 K/UL — HIGH (ref 3.8–10.5)
WBC # FLD AUTO: 21.58 K/UL — HIGH (ref 3.8–10.5)

## 2022-08-25 PROCEDURE — 70450 CT HEAD/BRAIN W/O DYE: CPT | Mod: MA

## 2022-08-25 PROCEDURE — 96372 THER/PROPH/DIAG INJ SC/IM: CPT | Mod: XU

## 2022-08-25 PROCEDURE — 84480 ASSAY TRIIODOTHYRONINE (T3): CPT

## 2022-08-25 PROCEDURE — 99284 EMERGENCY DEPT VISIT MOD MDM: CPT | Mod: 25

## 2022-08-25 PROCEDURE — 84436 ASSAY OF TOTAL THYROXINE: CPT

## 2022-08-25 PROCEDURE — 85025 COMPLETE CBC W/AUTO DIFF WBC: CPT

## 2022-08-25 PROCEDURE — 36415 COLL VENOUS BLD VENIPUNCTURE: CPT

## 2022-08-25 PROCEDURE — 96361 HYDRATE IV INFUSION ADD-ON: CPT

## 2022-08-25 PROCEDURE — 99053 MED SERV 10PM-8AM 24 HR FAC: CPT

## 2022-08-25 PROCEDURE — 83690 ASSAY OF LIPASE: CPT

## 2022-08-25 PROCEDURE — 99291 CRITICAL CARE FIRST HOUR: CPT

## 2022-08-25 PROCEDURE — G1004: CPT

## 2022-08-25 PROCEDURE — 96374 THER/PROPH/DIAG INJ IV PUSH: CPT | Mod: XU

## 2022-08-25 PROCEDURE — 84443 ASSAY THYROID STIM HORMONE: CPT

## 2022-08-25 PROCEDURE — 80053 COMPREHEN METABOLIC PANEL: CPT

## 2022-08-25 PROCEDURE — 70450 CT HEAD/BRAIN W/O DYE: CPT | Mod: 26,MA

## 2022-08-25 PROCEDURE — 74177 CT ABD & PELVIS W/CONTRAST: CPT | Mod: ME

## 2022-08-25 PROCEDURE — 74177 CT ABD & PELVIS W/CONTRAST: CPT | Mod: 26,ME

## 2022-08-25 PROCEDURE — 96375 TX/PRO/DX INJ NEW DRUG ADDON: CPT

## 2022-08-25 RX ORDER — HALOPERIDOL DECANOATE 100 MG/ML
2.5 INJECTION INTRAMUSCULAR ONCE
Refills: 0 | Status: COMPLETED | OUTPATIENT
Start: 2022-08-25 | End: 2022-08-25

## 2022-08-25 RX ORDER — SODIUM CHLORIDE 9 MG/ML
1000 INJECTION, SOLUTION INTRAVENOUS ONCE
Refills: 0 | Status: COMPLETED | OUTPATIENT
Start: 2022-08-25 | End: 2022-08-25

## 2022-08-25 RX ORDER — MORPHINE SULFATE 50 MG/1
4 CAPSULE, EXTENDED RELEASE ORAL ONCE
Refills: 0 | Status: DISCONTINUED | OUTPATIENT
Start: 2022-08-25 | End: 2022-08-25

## 2022-08-25 RX ORDER — SODIUM CHLORIDE 9 MG/ML
1000 INJECTION INTRAMUSCULAR; INTRAVENOUS; SUBCUTANEOUS ONCE
Refills: 0 | Status: COMPLETED | OUTPATIENT
Start: 2022-08-25 | End: 2022-08-25

## 2022-08-25 RX ORDER — ONDANSETRON 8 MG/1
4 TABLET, FILM COATED ORAL ONCE
Refills: 0 | Status: COMPLETED | OUTPATIENT
Start: 2022-08-25 | End: 2022-08-25

## 2022-08-25 RX ORDER — FAMOTIDINE 10 MG/ML
20 INJECTION INTRAVENOUS ONCE
Refills: 0 | Status: DISCONTINUED | OUTPATIENT
Start: 2022-08-25 | End: 2022-08-25

## 2022-08-25 RX ORDER — KETAMINE HYDROCHLORIDE 100 MG/ML
150 INJECTION INTRAMUSCULAR; INTRAVENOUS ONCE
Refills: 0 | Status: DISCONTINUED | OUTPATIENT
Start: 2022-08-25 | End: 2022-08-25

## 2022-08-25 RX ORDER — FAMOTIDINE 10 MG/ML
20 INJECTION INTRAVENOUS ONCE
Refills: 0 | Status: COMPLETED | OUTPATIENT
Start: 2022-08-25 | End: 2022-08-25

## 2022-08-25 RX ORDER — KETAMINE HYDROCHLORIDE 100 MG/ML
100 INJECTION INTRAMUSCULAR; INTRAVENOUS ONCE
Refills: 0 | Status: DISCONTINUED | OUTPATIENT
Start: 2022-08-25 | End: 2022-08-25

## 2022-08-25 RX ORDER — DIAZEPAM 5 MG
5 TABLET ORAL ONCE
Refills: 0 | Status: DISCONTINUED | OUTPATIENT
Start: 2022-08-25 | End: 2022-08-25

## 2022-08-25 RX ORDER — ONDANSETRON 8 MG/1
4 TABLET, FILM COATED ORAL ONCE
Refills: 0 | Status: DISCONTINUED | OUTPATIENT
Start: 2022-08-25 | End: 2022-08-25

## 2022-08-25 RX ORDER — KETAMINE HYDROCHLORIDE 100 MG/ML
200 INJECTION INTRAMUSCULAR; INTRAVENOUS ONCE
Refills: 0 | Status: DISCONTINUED | OUTPATIENT
Start: 2022-08-25 | End: 2022-08-25

## 2022-08-25 RX ORDER — ACETAMINOPHEN 500 MG
1000 TABLET ORAL ONCE
Refills: 0 | Status: COMPLETED | OUTPATIENT
Start: 2022-08-25 | End: 2022-08-25

## 2022-08-25 RX ORDER — HALOPERIDOL DECANOATE 100 MG/ML
5 INJECTION INTRAMUSCULAR ONCE
Refills: 0 | Status: COMPLETED | OUTPATIENT
Start: 2022-08-25 | End: 2022-08-25

## 2022-08-25 RX ORDER — HALOPERIDOL DECANOATE 100 MG/ML
2.5 INJECTION INTRAMUSCULAR ONCE
Refills: 0 | Status: DISCONTINUED | OUTPATIENT
Start: 2022-08-25 | End: 2022-08-25

## 2022-08-25 RX ORDER — METOCLOPRAMIDE HCL 10 MG
10 TABLET ORAL ONCE
Refills: 0 | Status: COMPLETED | OUTPATIENT
Start: 2022-08-25 | End: 2022-08-25

## 2022-08-25 RX ADMIN — Medication 104 MILLIGRAM(S): at 09:35

## 2022-08-25 RX ADMIN — Medication 400 MILLIGRAM(S): at 05:35

## 2022-08-25 RX ADMIN — MORPHINE SULFATE 4 MILLIGRAM(S): 50 CAPSULE, EXTENDED RELEASE ORAL at 06:58

## 2022-08-25 RX ADMIN — FAMOTIDINE 20 MILLIGRAM(S): 10 INJECTION INTRAVENOUS at 05:47

## 2022-08-25 RX ADMIN — SODIUM CHLORIDE 1000 MILLILITER(S): 9 INJECTION INTRAMUSCULAR; INTRAVENOUS; SUBCUTANEOUS at 01:38

## 2022-08-25 RX ADMIN — HALOPERIDOL DECANOATE 2.5 MILLIGRAM(S): 100 INJECTION INTRAMUSCULAR at 05:47

## 2022-08-25 RX ADMIN — SODIUM CHLORIDE 1000 MILLILITER(S): 9 INJECTION, SOLUTION INTRAVENOUS at 09:34

## 2022-08-25 RX ADMIN — ONDANSETRON 4 MILLIGRAM(S): 8 TABLET, FILM COATED ORAL at 08:09

## 2022-08-25 RX ADMIN — Medication 5 MILLIGRAM(S): at 06:58

## 2022-08-25 RX ADMIN — KETAMINE HYDROCHLORIDE 100 MILLIGRAM(S): 100 INJECTION INTRAMUSCULAR; INTRAVENOUS at 02:30

## 2022-08-25 RX ADMIN — HALOPERIDOL DECANOATE 5 MILLIGRAM(S): 100 INJECTION INTRAMUSCULAR at 01:38

## 2022-08-25 RX ADMIN — KETAMINE HYDROCHLORIDE 200 MILLIGRAM(S): 100 INJECTION INTRAMUSCULAR; INTRAVENOUS at 01:38

## 2022-08-25 RX ADMIN — KETAMINE HYDROCHLORIDE 150 MILLIGRAM(S): 100 INJECTION INTRAMUSCULAR; INTRAVENOUS at 07:57

## 2022-08-25 NOTE — ED ADULT NURSE REASSESSMENT NOTE - NS ED NURSE REASSESS COMMENT FT1
keep pacing & moving, complaining of abdominal pain, MD at bedside ordered meds, given
pt agitated trying to kick staff attempting to jump off bed. Pt medicated with 150mg im ketamine. placed on telemonitoring with end tidal nc. Pt tyolerated well. MD albright and team at bedside
pt medicated with 4mg zofran sp one episode on emesis.  pending CT scan.
still c/o abdominal pain requesting Dilaudid IV MD aware, ordered alternative pain meds that will address his complaints.
assumed care of pt at this time pt is resting resp even unlabored. Awakens to name. Pending Ct scan.

## 2022-08-25 NOTE — ED PROVIDER NOTE - NSICDXPASTMEDICALHX_GEN_ALL_CORE_FT
PAST MEDICAL HISTORY:  Drug abuse     ETOH abuse     Gastritis     Gastritis     No pertinent past medical history     Pancreatitis     Reflux esophagitis

## 2022-08-25 NOTE — ED PROVIDER NOTE - NSICDXPASTSURGICALHX_GEN_ALL_CORE_FT
PAST SURGICAL HISTORY:  History of esophagogastroduodenoscopy (EGD) 6/2017 and 2016    S/P cholecystectomy     S/P cholecystectomy     S/P cholecystectomy     Ulnar fracture

## 2022-08-25 NOTE — ED PROVIDER NOTE - CARE PLAN
Patient would like communication of results via:   [] Lynn Carmelo   [] Home Phone: [phone] (home)   [] Work Phone: There is no work phone number on file. [x] Cell Phone:    [phone] (mobile)        Emergency Contact: Pippa Baron    [] Home:    [phone]     [] Work:         [] Mobile:   [phone]         Emergency Contact: *No Contact Specified*    [] Home:       [] Work:         [] Mobile:      [] Letter  Okay to leave message containing results? Yes    Denies known Latex allergy or symptoms of Latex sensitivity. Medications reviewed and updated. Social History     Tobacco Use   Smoking Status Former Smoker   â¢ Types: Cigarettes   â¢ Last attempt to quit: 2005   â¢ Years since quittin.8   Smokeless Tobacco Never Used     [facility] with complaints of cough, chest congestion, and sneezing for almost one week. Principal Discharge DX:	Agitation   1 Principal Discharge DX:	Agitation  Secondary Diagnosis:	Abdominal pain

## 2022-08-25 NOTE — ED PROVIDER NOTE - PROGRESS NOTE DETAILS
Aldo: Pt required additional ketamine IV for agitation. Now calmer and resting comfortably. Carlson: Pt calmer but complaining of persistent pain, asking for dilaudid. Per review of prior records, pt has history of prior similar episodes, suspect component of drug-seeking behavior. MD Ellie: pt initially was agitated and was given another dose of ketamine. CT without acute abnormalities. Patient is awake and alert with clear speech and interacting and answering questions appropriately, has normal gait, and tolerating PO without further episodes of emesis. will discharge patient home at this time. printed out copies of results for patient to take home. discussed return precautions and need for outpatient follow up.

## 2022-08-25 NOTE — ED PROVIDER NOTE - PHYSICAL EXAMINATION
Constitutional: Awake, alert, agitated and uncooperative  Eyes: no scleral icterus  HENT: normocephalic, atraumatic, moist oral mucosa  Neck: supple  CV: RRR, no murmur  Pulm: non-labored respirations, CTAB  Abdomen: soft, non-tender, non-distended  Extremities: no edema, no deformity  Skin: no rash, no jaundice  Neuro: AAOx3, moving all extremities equally

## 2022-08-25 NOTE — ED PROVIDER NOTE - CLINICAL SUMMARY MEDICAL DECISION MAKING FREE TEXT BOX
26y M w/ hx polysubstance abuse, gastritis; presents for abdominal pain. Pt very agitated and uncooperative on arrival despite attempts at verbal deescalation. Given haldol and ketamine IM. No focal abdominal tenderness on exam. Will check labs, reassess.

## 2022-08-25 NOTE — ED PROVIDER NOTE - PATIENT PORTAL LINK FT
You can access the FollowMyHealth Patient Portal offered by [facility] by registering at the following website: [email or web address]. By joining [employer]’s FollowMyHealth portal, you will also be able to view your health information using other applications (apps) compatible with our system.

## 2022-08-25 NOTE — ED ADULT TRIAGE NOTE - CHIEF COMPLAINT QUOTE
pt with abdominal pain with N/V x 2 days. uncooperative during intake. hx of drug abuse, etoh abuse and cholecystomy.

## 2022-08-25 NOTE — ED PROVIDER NOTE - OBJECTIVE STATEMENT
26y M w/ hx substance abuse, gastritis, cholecystectomy; presents for abdominal pain. Pt very agitated and uncooperative on arrival; unable to provide much further history. Admits to using marijuana daily.

## 2022-08-25 NOTE — ED ADULT NURSE NOTE - OBJECTIVE STATEMENT
pt with abdominal pain with N/V x 2 days. uncooperative during intake. hx of drug abuse, etoh abuse and cholecystomy. noted pacing, aggressive & lifting the stretcher, Md aware & saw the patient at bedside, security at bedside

## 2022-12-20 NOTE — PATIENT PROFILE ADULT. - PRO ANTICIPATED DISCH DISP
home Itraconazole Counseling:  I discussed with the patient the risks of itraconazole including but not limited to liver damage, nausea/vomiting, neuropathy, and severe allergy.  The patient understands that this medication is best absorbed when taken with acidic beverages such as non-diet cola or ginger ale.  The patient understands that monitoring is required including baseline LFTs and repeat LFTs at intervals.  The patient understands that they are to contact us or the primary physician if concerning signs are noted.

## 2022-12-27 ENCOUNTER — INPATIENT (INPATIENT)
Facility: HOSPITAL | Age: 27
LOS: 0 days | Discharge: LEFT BEFORE DISCHARGE | DRG: 392 | End: 2022-12-28
Attending: STUDENT IN AN ORGANIZED HEALTH CARE EDUCATION/TRAINING PROGRAM | Admitting: STUDENT IN AN ORGANIZED HEALTH CARE EDUCATION/TRAINING PROGRAM
Payer: MEDICAID

## 2022-12-27 VITALS
SYSTOLIC BLOOD PRESSURE: 128 MMHG | TEMPERATURE: 99 F | HEIGHT: 65 IN | HEART RATE: 118 BPM | DIASTOLIC BLOOD PRESSURE: 83 MMHG | OXYGEN SATURATION: 96 % | RESPIRATION RATE: 16 BRPM | WEIGHT: 130.07 LBS

## 2022-12-27 DIAGNOSIS — Z90.49 ACQUIRED ABSENCE OF OTHER SPECIFIED PARTS OF DIGESTIVE TRACT: Chronic | ICD-10-CM

## 2022-12-27 DIAGNOSIS — F19.939 OTHER PSYCHOACTIVE SUBSTANCE USE, UNSPECIFIED WITH WITHDRAWAL, UNSPECIFIED: ICD-10-CM

## 2022-12-27 DIAGNOSIS — Z98.890 OTHER SPECIFIED POSTPROCEDURAL STATES: Chronic | ICD-10-CM

## 2022-12-27 DIAGNOSIS — S52.209A UNSPECIFIED FRACTURE OF SHAFT OF UNSPECIFIED ULNA, INITIAL ENCOUNTER FOR CLOSED FRACTURE: Chronic | ICD-10-CM

## 2022-12-27 LAB
ALBUMIN SERPL ELPH-MCNC: 3.9 G/DL — SIGNIFICANT CHANGE UP (ref 3.3–5.2)
ALBUMIN SERPL ELPH-MCNC: 4.1 G/DL — SIGNIFICANT CHANGE UP (ref 3.3–5.2)
ALP SERPL-CCNC: 115 U/L — SIGNIFICANT CHANGE UP (ref 40–120)
ALP SERPL-CCNC: 125 U/L — HIGH (ref 40–120)
ALT FLD-CCNC: 7 U/L — SIGNIFICANT CHANGE UP
ALT FLD-CCNC: 8 U/L — SIGNIFICANT CHANGE UP
AMPHET UR-MCNC: NEGATIVE — SIGNIFICANT CHANGE UP
ANION GAP SERPL CALC-SCNC: 14 MMOL/L — SIGNIFICANT CHANGE UP (ref 5–17)
ANION GAP SERPL CALC-SCNC: 15 MMOL/L — SIGNIFICANT CHANGE UP (ref 5–17)
APPEARANCE UR: CLEAR — SIGNIFICANT CHANGE UP
APTT BLD: 30.2 SEC — SIGNIFICANT CHANGE UP (ref 27.5–35.5)
AST SERPL-CCNC: 17 U/L — SIGNIFICANT CHANGE UP
AST SERPL-CCNC: 18 U/L — SIGNIFICANT CHANGE UP
BARBITURATES UR SCN-MCNC: NEGATIVE — SIGNIFICANT CHANGE UP
BASOPHILS # BLD AUTO: 0.03 K/UL — SIGNIFICANT CHANGE UP (ref 0–0.2)
BASOPHILS # BLD AUTO: 0.03 K/UL — SIGNIFICANT CHANGE UP (ref 0–0.2)
BASOPHILS NFR BLD AUTO: 0.2 % — SIGNIFICANT CHANGE UP (ref 0–2)
BASOPHILS NFR BLD AUTO: 0.2 % — SIGNIFICANT CHANGE UP (ref 0–2)
BENZODIAZ UR-MCNC: POSITIVE
BILIRUB SERPL-MCNC: 0.3 MG/DL — LOW (ref 0.4–2)
BILIRUB SERPL-MCNC: 0.6 MG/DL — SIGNIFICANT CHANGE UP (ref 0.4–2)
BILIRUB UR-MCNC: NEGATIVE — SIGNIFICANT CHANGE UP
BUN SERPL-MCNC: 14.1 MG/DL — SIGNIFICANT CHANGE UP (ref 8–20)
BUN SERPL-MCNC: 9 MG/DL — SIGNIFICANT CHANGE UP (ref 8–20)
CALCIUM SERPL-MCNC: 8.7 MG/DL — SIGNIFICANT CHANGE UP (ref 8.4–10.5)
CALCIUM SERPL-MCNC: 8.9 MG/DL — SIGNIFICANT CHANGE UP (ref 8.4–10.5)
CHLORIDE SERPL-SCNC: 102 MMOL/L — SIGNIFICANT CHANGE UP (ref 96–108)
CHLORIDE SERPL-SCNC: 99 MMOL/L — SIGNIFICANT CHANGE UP (ref 96–108)
CO2 SERPL-SCNC: 23 MMOL/L — SIGNIFICANT CHANGE UP (ref 22–29)
CO2 SERPL-SCNC: 24 MMOL/L — SIGNIFICANT CHANGE UP (ref 22–29)
COCAINE METAB.OTHER UR-MCNC: POSITIVE
COLOR SPEC: YELLOW — SIGNIFICANT CHANGE UP
CREAT SERPL-MCNC: 0.48 MG/DL — LOW (ref 0.5–1.3)
CREAT SERPL-MCNC: 0.56 MG/DL — SIGNIFICANT CHANGE UP (ref 0.5–1.3)
DIFF PNL FLD: NEGATIVE — SIGNIFICANT CHANGE UP
EGFR: 137 ML/MIN/1.73M2 — SIGNIFICANT CHANGE UP
EGFR: 143 ML/MIN/1.73M2 — SIGNIFICANT CHANGE UP
EOSINOPHIL # BLD AUTO: 0 K/UL — SIGNIFICANT CHANGE UP (ref 0–0.5)
EOSINOPHIL # BLD AUTO: 0.06 K/UL — SIGNIFICANT CHANGE UP (ref 0–0.5)
EOSINOPHIL NFR BLD AUTO: 0 % — SIGNIFICANT CHANGE UP (ref 0–6)
EOSINOPHIL NFR BLD AUTO: 0.4 % — SIGNIFICANT CHANGE UP (ref 0–6)
ETHANOL SERPL-MCNC: <10 MG/DL — SIGNIFICANT CHANGE UP (ref 0–9)
GLUCOSE SERPL-MCNC: 110 MG/DL — HIGH (ref 70–99)
GLUCOSE SERPL-MCNC: 123 MG/DL — HIGH (ref 70–99)
GLUCOSE UR QL: NEGATIVE MG/DL — SIGNIFICANT CHANGE UP
HCT VFR BLD CALC: 41.7 % — SIGNIFICANT CHANGE UP (ref 39–50)
HCT VFR BLD CALC: 42.5 % — SIGNIFICANT CHANGE UP (ref 39–50)
HGB BLD-MCNC: 14.5 G/DL — SIGNIFICANT CHANGE UP (ref 13–17)
HGB BLD-MCNC: 14.7 G/DL — SIGNIFICANT CHANGE UP (ref 13–17)
IMM GRANULOCYTES NFR BLD AUTO: 0.3 % — SIGNIFICANT CHANGE UP (ref 0–0.9)
IMM GRANULOCYTES NFR BLD AUTO: 0.3 % — SIGNIFICANT CHANGE UP (ref 0–0.9)
INR BLD: 1.14 RATIO — SIGNIFICANT CHANGE UP (ref 0.88–1.16)
KETONES UR-MCNC: ABNORMAL
LEUKOCYTE ESTERASE UR-ACNC: NEGATIVE — SIGNIFICANT CHANGE UP
LIDOCAIN IGE QN: 18 U/L — LOW (ref 22–51)
LYMPHOCYTES # BLD AUTO: 0.83 K/UL — LOW (ref 1–3.3)
LYMPHOCYTES # BLD AUTO: 1.04 K/UL — SIGNIFICANT CHANGE UP (ref 1–3.3)
LYMPHOCYTES # BLD AUTO: 5.1 % — LOW (ref 13–44)
LYMPHOCYTES # BLD AUTO: 6.8 % — LOW (ref 13–44)
MAGNESIUM SERPL-MCNC: 1.7 MG/DL — SIGNIFICANT CHANGE UP (ref 1.6–2.6)
MCHC RBC-ENTMCNC: 31.5 PG — SIGNIFICANT CHANGE UP (ref 27–34)
MCHC RBC-ENTMCNC: 31.7 PG — SIGNIFICANT CHANGE UP (ref 27–34)
MCHC RBC-ENTMCNC: 34.6 GM/DL — SIGNIFICANT CHANGE UP (ref 32–36)
MCHC RBC-ENTMCNC: 34.8 GM/DL — SIGNIFICANT CHANGE UP (ref 32–36)
MCV RBC AUTO: 91.2 FL — SIGNIFICANT CHANGE UP (ref 80–100)
MCV RBC AUTO: 91.2 FL — SIGNIFICANT CHANGE UP (ref 80–100)
METHADONE UR-MCNC: NEGATIVE — SIGNIFICANT CHANGE UP
MONOCYTES # BLD AUTO: 0.48 K/UL — SIGNIFICANT CHANGE UP (ref 0–0.9)
MONOCYTES # BLD AUTO: 0.62 K/UL — SIGNIFICANT CHANGE UP (ref 0–0.9)
MONOCYTES NFR BLD AUTO: 2.9 % — SIGNIFICANT CHANGE UP (ref 2–14)
MONOCYTES NFR BLD AUTO: 4.1 % — SIGNIFICANT CHANGE UP (ref 2–14)
NEUTROPHILS # BLD AUTO: 13.4 K/UL — HIGH (ref 1.8–7.4)
NEUTROPHILS # BLD AUTO: 15.04 K/UL — HIGH (ref 1.8–7.4)
NEUTROPHILS NFR BLD AUTO: 88.2 % — HIGH (ref 43–77)
NEUTROPHILS NFR BLD AUTO: 91.5 % — HIGH (ref 43–77)
NITRITE UR-MCNC: NEGATIVE — SIGNIFICANT CHANGE UP
OPIATES UR-MCNC: NEGATIVE — SIGNIFICANT CHANGE UP
PCP SPEC-MCNC: SIGNIFICANT CHANGE UP
PCP UR-MCNC: NEGATIVE — SIGNIFICANT CHANGE UP
PH UR: 7 — SIGNIFICANT CHANGE UP (ref 5–8)
PLATELET # BLD AUTO: 387 K/UL — SIGNIFICANT CHANGE UP (ref 150–400)
PLATELET # BLD AUTO: 391 K/UL — SIGNIFICANT CHANGE UP (ref 150–400)
POTASSIUM SERPL-MCNC: 3.4 MMOL/L — LOW (ref 3.5–5.3)
POTASSIUM SERPL-MCNC: 3.6 MMOL/L — SIGNIFICANT CHANGE UP (ref 3.5–5.3)
POTASSIUM SERPL-SCNC: 3.4 MMOL/L — LOW (ref 3.5–5.3)
POTASSIUM SERPL-SCNC: 3.6 MMOL/L — SIGNIFICANT CHANGE UP (ref 3.5–5.3)
PROT SERPL-MCNC: 6.4 G/DL — LOW (ref 6.6–8.7)
PROT SERPL-MCNC: 6.7 G/DL — SIGNIFICANT CHANGE UP (ref 6.6–8.7)
PROT UR-MCNC: NEGATIVE — SIGNIFICANT CHANGE UP
PROTHROM AB SERPL-ACNC: 13.3 SEC — SIGNIFICANT CHANGE UP (ref 10.5–13.4)
RAPID RVP RESULT: SIGNIFICANT CHANGE UP
RBC # BLD: 4.57 M/UL — SIGNIFICANT CHANGE UP (ref 4.2–5.8)
RBC # BLD: 4.66 M/UL — SIGNIFICANT CHANGE UP (ref 4.2–5.8)
RBC # FLD: 13.2 % — SIGNIFICANT CHANGE UP (ref 10.3–14.5)
RBC # FLD: 13.5 % — SIGNIFICANT CHANGE UP (ref 10.3–14.5)
SARS-COV-2 RNA SPEC QL NAA+PROBE: SIGNIFICANT CHANGE UP
SODIUM SERPL-SCNC: 136 MMOL/L — SIGNIFICANT CHANGE UP (ref 135–145)
SODIUM SERPL-SCNC: 141 MMOL/L — SIGNIFICANT CHANGE UP (ref 135–145)
SP GR SPEC: 1 — LOW (ref 1.01–1.02)
T4 AB SER-ACNC: 8.7 UG/DL — SIGNIFICANT CHANGE UP (ref 4.5–12)
THC UR QL: POSITIVE
TROPONIN T SERPL-MCNC: <0.01 NG/ML — SIGNIFICANT CHANGE UP (ref 0–0.06)
TSH SERPL-MCNC: 0.28 UIU/ML — SIGNIFICANT CHANGE UP (ref 0.27–4.2)
UROBILINOGEN FLD QL: NEGATIVE MG/DL — SIGNIFICANT CHANGE UP
WBC # BLD: 15.19 K/UL — HIGH (ref 3.8–10.5)
WBC # BLD: 16.43 K/UL — HIGH (ref 3.8–10.5)
WBC # FLD AUTO: 15.19 K/UL — HIGH (ref 3.8–10.5)
WBC # FLD AUTO: 16.43 K/UL — HIGH (ref 3.8–10.5)

## 2022-12-27 PROCEDURE — 93010 ELECTROCARDIOGRAM REPORT: CPT

## 2022-12-27 PROCEDURE — 99223 1ST HOSP IP/OBS HIGH 75: CPT

## 2022-12-27 PROCEDURE — 99285 EMERGENCY DEPT VISIT HI MDM: CPT

## 2022-12-27 RX ORDER — MIDAZOLAM HYDROCHLORIDE 1 MG/ML
2 INJECTION, SOLUTION INTRAMUSCULAR; INTRAVENOUS ONCE
Refills: 0 | Status: DISCONTINUED | OUTPATIENT
Start: 2022-12-27 | End: 2022-12-27

## 2022-12-27 RX ORDER — MAGNESIUM SULFATE 500 MG/ML
2 VIAL (ML) INJECTION ONCE
Refills: 0 | Status: COMPLETED | OUTPATIENT
Start: 2022-12-27 | End: 2022-12-27

## 2022-12-27 RX ORDER — PIPERACILLIN AND TAZOBACTAM 4; .5 G/20ML; G/20ML
3.38 INJECTION, POWDER, LYOPHILIZED, FOR SOLUTION INTRAVENOUS ONCE
Refills: 0 | Status: COMPLETED | OUTPATIENT
Start: 2022-12-28 | End: 2022-12-28

## 2022-12-27 RX ORDER — FOLIC ACID 0.8 MG
1 TABLET ORAL DAILY
Refills: 0 | Status: DISCONTINUED | OUTPATIENT
Start: 2022-12-27 | End: 2022-12-28

## 2022-12-27 RX ORDER — PIPERACILLIN AND TAZOBACTAM 4; .5 G/20ML; G/20ML
3.38 INJECTION, POWDER, LYOPHILIZED, FOR SOLUTION INTRAVENOUS ONCE
Refills: 0 | Status: COMPLETED | OUTPATIENT
Start: 2022-12-27 | End: 2022-12-27

## 2022-12-27 RX ORDER — DIPHENHYDRAMINE HCL 50 MG
50 CAPSULE ORAL ONCE
Refills: 0 | Status: COMPLETED | OUTPATIENT
Start: 2022-12-27 | End: 2022-12-27

## 2022-12-27 RX ORDER — SODIUM CHLORIDE 9 MG/ML
1000 INJECTION INTRAMUSCULAR; INTRAVENOUS; SUBCUTANEOUS ONCE
Refills: 0 | Status: COMPLETED | OUTPATIENT
Start: 2022-12-27 | End: 2022-12-27

## 2022-12-27 RX ORDER — ONDANSETRON 8 MG/1
4 TABLET, FILM COATED ORAL EVERY 8 HOURS
Refills: 0 | Status: DISCONTINUED | OUTPATIENT
Start: 2022-12-27 | End: 2022-12-28

## 2022-12-27 RX ORDER — PROCHLORPERAZINE MALEATE 5 MG
10 TABLET ORAL ONCE
Refills: 0 | Status: COMPLETED | OUTPATIENT
Start: 2022-12-27 | End: 2022-12-27

## 2022-12-27 RX ORDER — PIPERACILLIN AND TAZOBACTAM 4; .5 G/20ML; G/20ML
3.38 INJECTION, POWDER, LYOPHILIZED, FOR SOLUTION INTRAVENOUS EVERY 8 HOURS
Refills: 0 | Status: DISCONTINUED | OUTPATIENT
Start: 2022-12-28 | End: 2022-12-28

## 2022-12-27 RX ORDER — SODIUM CHLORIDE 9 MG/ML
3 INJECTION INTRAMUSCULAR; INTRAVENOUS; SUBCUTANEOUS ONCE
Refills: 0 | Status: COMPLETED | OUTPATIENT
Start: 2022-12-27 | End: 2022-12-27

## 2022-12-27 RX ORDER — LANOLIN ALCOHOL/MO/W.PET/CERES
3 CREAM (GRAM) TOPICAL AT BEDTIME
Refills: 0 | Status: DISCONTINUED | OUTPATIENT
Start: 2022-12-27 | End: 2022-12-28

## 2022-12-27 RX ORDER — VANCOMYCIN HCL 1 G
1000 VIAL (EA) INTRAVENOUS ONCE
Refills: 0 | Status: COMPLETED | OUTPATIENT
Start: 2022-12-27 | End: 2022-12-27

## 2022-12-27 RX ORDER — ONDANSETRON 8 MG/1
4 TABLET, FILM COATED ORAL ONCE
Refills: 0 | Status: COMPLETED | OUTPATIENT
Start: 2022-12-27 | End: 2022-12-27

## 2022-12-27 RX ORDER — ENOXAPARIN SODIUM 100 MG/ML
40 INJECTION SUBCUTANEOUS EVERY 24 HOURS
Refills: 0 | Status: DISCONTINUED | OUTPATIENT
Start: 2022-12-27 | End: 2022-12-28

## 2022-12-27 RX ORDER — MIDAZOLAM HYDROCHLORIDE 1 MG/ML
5 INJECTION, SOLUTION INTRAMUSCULAR; INTRAVENOUS ONCE
Refills: 0 | Status: DISCONTINUED | OUTPATIENT
Start: 2022-12-27 | End: 2022-12-27

## 2022-12-27 RX ORDER — SODIUM CHLORIDE 9 MG/ML
1000 INJECTION, SOLUTION INTRAVENOUS
Refills: 0 | Status: DISCONTINUED | OUTPATIENT
Start: 2022-12-27 | End: 2022-12-28

## 2022-12-27 RX ORDER — THIAMINE MONONITRATE (VIT B1) 100 MG
100 TABLET ORAL EVERY 8 HOURS
Refills: 0 | Status: DISCONTINUED | OUTPATIENT
Start: 2022-12-27 | End: 2022-12-28

## 2022-12-27 RX ORDER — POTASSIUM CHLORIDE 20 MEQ
10 PACKET (EA) ORAL
Refills: 0 | Status: COMPLETED | OUTPATIENT
Start: 2022-12-27 | End: 2022-12-27

## 2022-12-27 RX ORDER — ACETAMINOPHEN 500 MG
1000 TABLET ORAL ONCE
Refills: 0 | Status: COMPLETED | OUTPATIENT
Start: 2022-12-27 | End: 2022-12-27

## 2022-12-27 RX ORDER — SODIUM CHLORIDE 9 MG/ML
1000 INJECTION, SOLUTION INTRAVENOUS ONCE
Refills: 0 | Status: COMPLETED | OUTPATIENT
Start: 2022-12-27 | End: 2022-12-27

## 2022-12-27 RX ADMIN — Medication 2 MILLIGRAM(S): at 18:49

## 2022-12-27 RX ADMIN — Medication 2 MILLIGRAM(S): at 18:41

## 2022-12-27 RX ADMIN — Medication 100 MILLIEQUIVALENT(S): at 16:22

## 2022-12-27 RX ADMIN — Medication 100 MILLIEQUIVALENT(S): at 15:20

## 2022-12-27 RX ADMIN — Medication 2 MILLIGRAM(S): at 21:52

## 2022-12-27 RX ADMIN — Medication 2 MILLIGRAM(S): at 12:34

## 2022-12-27 RX ADMIN — Medication 100 MILLIGRAM(S): at 21:52

## 2022-12-27 RX ADMIN — Medication 1000 MILLIGRAM(S): at 12:48

## 2022-12-27 RX ADMIN — Medication 250 MILLIGRAM(S): at 21:52

## 2022-12-27 RX ADMIN — MIDAZOLAM HYDROCHLORIDE 5 MILLIGRAM(S): 1 INJECTION, SOLUTION INTRAMUSCULAR; INTRAVENOUS at 01:54

## 2022-12-27 RX ADMIN — Medication 104 MILLIGRAM(S): at 01:55

## 2022-12-27 RX ADMIN — ONDANSETRON 4 MILLIGRAM(S): 8 TABLET, FILM COATED ORAL at 09:15

## 2022-12-27 RX ADMIN — SODIUM CHLORIDE 1000 MILLILITER(S): 9 INJECTION INTRAMUSCULAR; INTRAVENOUS; SUBCUTANEOUS at 01:55

## 2022-12-27 RX ADMIN — SODIUM CHLORIDE 1000 MILLILITER(S): 9 INJECTION, SOLUTION INTRAVENOUS at 10:22

## 2022-12-27 RX ADMIN — Medication 50 MILLIGRAM(S): at 01:54

## 2022-12-27 RX ADMIN — Medication 100 MILLIGRAM(S): at 15:34

## 2022-12-27 RX ADMIN — Medication 25 GRAM(S): at 11:54

## 2022-12-27 RX ADMIN — Medication 400 MILLIGRAM(S): at 11:53

## 2022-12-27 RX ADMIN — MIDAZOLAM HYDROCHLORIDE 2 MILLIGRAM(S): 1 INJECTION, SOLUTION INTRAMUSCULAR; INTRAVENOUS at 10:01

## 2022-12-27 RX ADMIN — Medication 100 MILLIEQUIVALENT(S): at 14:19

## 2022-12-27 RX ADMIN — Medication 2 MILLIGRAM(S): at 15:19

## 2022-12-27 RX ADMIN — PIPERACILLIN AND TAZOBACTAM 25 GRAM(S): 4; .5 INJECTION, POWDER, LYOPHILIZED, FOR SOLUTION INTRAVENOUS at 18:40

## 2022-12-27 RX ADMIN — PIPERACILLIN AND TAZOBACTAM 200 GRAM(S): 4; .5 INJECTION, POWDER, LYOPHILIZED, FOR SOLUTION INTRAVENOUS at 16:12

## 2022-12-27 RX ADMIN — SODIUM CHLORIDE 125 MILLILITER(S): 9 INJECTION, SOLUTION INTRAVENOUS at 11:53

## 2022-12-27 NOTE — H&P ADULT - NSHPLABSRESULTS_GEN_ALL_CORE
LABS:                         14.5   16.43 )-----------( 391      ( 27 Dec 2022 10:00 )             41.7     12-27    141  |  102  |  9.0  ----------------------------<  110<H>  3.4<L>   |  24.0  |  0.48<L>    Ca    8.7      27 Dec 2022 10:00  Mg     1.7     12-27    TPro  6.4<L>  /  Alb  3.9  /  TBili  0.6  /  DBili  x   /  AST  18  /  ALT  7   /  AlkPhos  115  12-27    PT/INR - ( 27 Dec 2022 10:00 )   PT: 13.3 sec;   INR: 1.14 ratio         PTT - ( 27 Dec 2022 10:00 )  PTT:30.2 sec    CARDIAC MARKERS ( 27 Dec 2022 10:00 )  x     / <0.01 ng/mL / x     / x     / x                RADIOLOGY, EKG & ADDITIONAL TESTS:     Pending CT H and CXR

## 2022-12-27 NOTE — ED PROVIDER NOTE - CROS ED ROS STATEMENT
Here with injury to 3rd digit on right hand. States it was injured while catching a football in school yesterday     Triage Assessment     Row Name 09/13/22 9450       Triage Assessment (Pediatric)    Airway WDL WDL       Respiratory WDL    Respiratory WDL WDL       Skin Circulation/Temperature WDL    Skin Circulation/Temperature WDL WDL               all other ROS negative except as per HPI

## 2022-12-27 NOTE — ED ADULT NURSE REASSESSMENT NOTE - NS ED NURSE REASSESS COMMENT FT1
pt vomiting and flailing around in bed. moved to hallway next to nursing station to better watch patient and ensure safety.

## 2022-12-27 NOTE — ED ADULT NURSE NOTE - OBJECTIVE STATEMENT
pt arrived from [facility] to [facility].  pt currently asleep and intoxicated.  in yellow gown.  no s/s acute distress noted.  pt began screaming he's in pain.  security and other staff arrived, pt moved to [facility]

## 2022-12-27 NOTE — ED ADULT TRIAGE NOTE - CHIEF COMPLAINT QUOTE
Pt A&Ox4 states "It hurts." BIBA c/o vomiting. Patient intoxicated, pt changed into yellow gown belongings secured. Denies SI/HI

## 2022-12-27 NOTE — ED PROVIDER NOTE - PROGRESS NOTE DETAILS
Kori Schneider for ED attending, Dr. Levin: Pt becoming agitated, aggressive and cursing at staff, verbal de-escalation attempted unsuccessfully, patient was provided medication to manage their agitation.

## 2022-12-27 NOTE — H&P ADULT - HISTORY OF PRESENT ILLNESS
26 y/o M w/ a hx of Polysubstance abuse here for vomiting. Pt complaining of 2 hours of stomach pain and agitation likely 2/2 substance withdrawal, came to ED for pain medication. Altercation w/ ED staff after being found w/ multiple crack pipes. Pt seen at bedside, in moderate distress due to withdrawal. Endorses stomach pain for 2 hours, denies HA, CP, SOB, NVD, lethargy. Remaining ROS neg other than mentioned.

## 2022-12-27 NOTE — ED PROVIDER NOTE - CLINICAL SUMMARY MEDICAL DECISION MAKING FREE TEXT BOX
26 yo male with hx of polysubstance abuse presents to the ED c/o abd pain with vomiting.  Pt required medication for initial agitation.  will check labs, hydrate and re-eval after meds

## 2022-12-27 NOTE — H&P ADULT - ASSESSMENT
26 y/o M w/ a hx of Polysubstance abuse here for acute on chronic gastritis and substance withdrawal    #Acute on chronic gastritis  Hx of gastritis, likely 2/2 extensive ETOH abuse  Endorses stomach pain, likely exaggerated 2/2 withdrawal demanding Dilaudid  - start PPI  - start maalox for immediate relief  - Address withdrawal  - start IVLR @125cc/hr  - CLD until pain improves    #Polysubstance abuse w/ Withdrawal  Hx of ETOH, Crack Cocaine and Opiate abuse  No tox screen or ETOH level by ED  - obtain ETOH level and Tox Screen STAT  - CIWA protocol  - Ativan Taper for withdrawal  - Ativan 2mg Q4 PRN for agitation and breakthrough withdrawal  - Obtain Head CT due to endrosement of episodic AMS by ED  - Tachycardic in the setting of withdrawal - tele/ monitoring    #SIRS positive in the setting of withdrawal  Leukocytosis likely reactive  Tachycardic likely due to withdrawal  Spiked new fever of 100.9 likely 2/2 Tachycardia  Will follow sepsis protocol until symptoms ed  - Start empiric Vancomycin and Zosyn after blood cultures collected  - On maintenance IVLR  - obtain Lactate level baseline, if >2 -> Trend until normalizes  - obtain UA and CXR    #Hypokalemia  likely 2/2 etoh abuse  replenish  trend chemistry daily  maintain Mg >2, Phos >4, K >4    #Healthcare Maintenance  DVT PPx - Lovenox  Diet - Regular  Dispo - Pending resolution of withdrawal

## 2022-12-27 NOTE — ED ADULT NURSE REASSESSMENT NOTE - NS ED NURSE REASSESS COMMENT FT1
pt returned from critical.  pt received versed, benadryl, compazine, NS.  no s/s acute distress noted.   pt os sat in 90s

## 2022-12-27 NOTE — H&P ADULT - NSHPPHYSICALEXAM_GEN_ALL_CORE
PHYSICAL EXAM:    General: thin, agitated, in moderate distress due to pain and withdrawal  Eyes: PERRLA, EOMI; conjunctiva and sclera clear  Head: Normocephalic; atraumatic  ENMT: No nasal discharge; airway clear  Neck: Supple; non tender; no masses  Respiratory: No wheezes, rales or rhonchi  Cardiovascular: Regular rate and rhythm. S1 and S2 Normal; No murmurs, gallops or rubs  Gastrointestinal: Soft non-tender non-distended; Normal bowel sounds  Genitourinary: No costovertebral angle tenderness  Extremities: Normal range of motion, No clubbing, cyanosis or edema  Vascular: Peripheral pulses palpable 2+ bilaterally  Neurological: Alert and oriented x4  Skin: Warm and dry. excoriations noted on face  Lymph Nodes: No acute cervical adenopathy  Musculoskeletal: Normal gait, tone, without deformities  Psychiatric: agitated, anxious, combative

## 2022-12-27 NOTE — ED ADULT NURSE REASSESSMENT NOTE - NS ED NURSE REASSESS COMMENT FT1
assumed care of pt at this time. pt sleeping comfortably, in no apparent distress. will continue to monitor.

## 2022-12-27 NOTE — ED PROVIDER NOTE - OBJECTIVE STATEMENT
28 y/o male with PMHx of polysubstance abuse presents to the ED c/o vomiting. Pt was attempting to punch staff in ED and was evaluated with security at bedside after confiscation of multiple crack pipes. Pt denies alcohol use, declining to admit or deny crack usage. Pt also c/o abdominal pain.

## 2022-12-28 ENCOUNTER — TRANSCRIPTION ENCOUNTER (OUTPATIENT)
Age: 27
End: 2022-12-28

## 2022-12-28 VITALS
OXYGEN SATURATION: 92 % | SYSTOLIC BLOOD PRESSURE: 113 MMHG | TEMPERATURE: 98 F | RESPIRATION RATE: 17 BRPM | HEART RATE: 91 BPM | DIASTOLIC BLOOD PRESSURE: 81 MMHG

## 2022-12-28 LAB
A1C WITH ESTIMATED AVERAGE GLUCOSE RESULT: 5.3 % — SIGNIFICANT CHANGE UP (ref 4–5.6)
ALBUMIN SERPL ELPH-MCNC: 3.4 G/DL — SIGNIFICANT CHANGE UP (ref 3.3–5.2)
ALP SERPL-CCNC: 110 U/L — SIGNIFICANT CHANGE UP (ref 40–120)
ALT FLD-CCNC: 6 U/L — SIGNIFICANT CHANGE UP
ANION GAP SERPL CALC-SCNC: 12 MMOL/L — SIGNIFICANT CHANGE UP (ref 5–17)
AST SERPL-CCNC: 15 U/L — SIGNIFICANT CHANGE UP
BASOPHILS # BLD AUTO: 0.03 K/UL — SIGNIFICANT CHANGE UP (ref 0–0.2)
BASOPHILS NFR BLD AUTO: 0.2 % — SIGNIFICANT CHANGE UP (ref 0–2)
BILIRUB SERPL-MCNC: 0.7 MG/DL — SIGNIFICANT CHANGE UP (ref 0.4–2)
BUN SERPL-MCNC: 7.7 MG/DL — LOW (ref 8–20)
CALCIUM SERPL-MCNC: 8.9 MG/DL — SIGNIFICANT CHANGE UP (ref 8.4–10.5)
CHLORIDE SERPL-SCNC: 102 MMOL/L — SIGNIFICANT CHANGE UP (ref 96–108)
CO2 SERPL-SCNC: 27 MMOL/L — SIGNIFICANT CHANGE UP (ref 22–29)
CREAT SERPL-MCNC: 0.61 MG/DL — SIGNIFICANT CHANGE UP (ref 0.5–1.3)
CULTURE RESULTS: SIGNIFICANT CHANGE UP
EGFR: 133 ML/MIN/1.73M2 — SIGNIFICANT CHANGE UP
EOSINOPHIL # BLD AUTO: 0.01 K/UL — SIGNIFICANT CHANGE UP (ref 0–0.5)
EOSINOPHIL NFR BLD AUTO: 0.1 % — SIGNIFICANT CHANGE UP (ref 0–6)
ESTIMATED AVERAGE GLUCOSE: 105 MG/DL — SIGNIFICANT CHANGE UP (ref 68–114)
GLUCOSE SERPL-MCNC: 100 MG/DL — HIGH (ref 70–99)
HCT VFR BLD CALC: 46.4 % — SIGNIFICANT CHANGE UP (ref 39–50)
HGB BLD-MCNC: 15.6 G/DL — SIGNIFICANT CHANGE UP (ref 13–17)
IMM GRANULOCYTES NFR BLD AUTO: 0.6 % — SIGNIFICANT CHANGE UP (ref 0–0.9)
LYMPHOCYTES # BLD AUTO: 1.53 K/UL — SIGNIFICANT CHANGE UP (ref 1–3.3)
LYMPHOCYTES # BLD AUTO: 8.1 % — LOW (ref 13–44)
MCHC RBC-ENTMCNC: 31.2 PG — SIGNIFICANT CHANGE UP (ref 27–34)
MCHC RBC-ENTMCNC: 33.6 GM/DL — SIGNIFICANT CHANGE UP (ref 32–36)
MCV RBC AUTO: 92.8 FL — SIGNIFICANT CHANGE UP (ref 80–100)
MONOCYTES # BLD AUTO: 0.59 K/UL — SIGNIFICANT CHANGE UP (ref 0–0.9)
MONOCYTES NFR BLD AUTO: 3.1 % — SIGNIFICANT CHANGE UP (ref 2–14)
NEUTROPHILS # BLD AUTO: 16.52 K/UL — HIGH (ref 1.8–7.4)
NEUTROPHILS NFR BLD AUTO: 87.9 % — HIGH (ref 43–77)
PLATELET # BLD AUTO: 361 K/UL — SIGNIFICANT CHANGE UP (ref 150–400)
POTASSIUM SERPL-MCNC: 4.2 MMOL/L — SIGNIFICANT CHANGE UP (ref 3.5–5.3)
POTASSIUM SERPL-SCNC: 4.2 MMOL/L — SIGNIFICANT CHANGE UP (ref 3.5–5.3)
PROT SERPL-MCNC: 6.3 G/DL — LOW (ref 6.6–8.7)
RBC # BLD: 5 M/UL — SIGNIFICANT CHANGE UP (ref 4.2–5.8)
RBC # FLD: 13.2 % — SIGNIFICANT CHANGE UP (ref 10.3–14.5)
SODIUM SERPL-SCNC: 140 MMOL/L — SIGNIFICANT CHANGE UP (ref 135–145)
SPECIMEN SOURCE: SIGNIFICANT CHANGE UP
WBC # BLD: 18.79 K/UL — HIGH (ref 3.8–10.5)
WBC # FLD AUTO: 18.79 K/UL — HIGH (ref 3.8–10.5)

## 2022-12-28 PROCEDURE — 85025 COMPLETE CBC W/AUTO DIFF WBC: CPT

## 2022-12-28 PROCEDURE — 99285 EMERGENCY DEPT VISIT HI MDM: CPT | Mod: 25

## 2022-12-28 PROCEDURE — 80053 COMPREHEN METABOLIC PANEL: CPT

## 2022-12-28 PROCEDURE — 85730 THROMBOPLASTIN TIME PARTIAL: CPT

## 2022-12-28 PROCEDURE — 99239 HOSP IP/OBS DSCHRG MGMT >30: CPT

## 2022-12-28 PROCEDURE — 83690 ASSAY OF LIPASE: CPT

## 2022-12-28 PROCEDURE — 87040 BLOOD CULTURE FOR BACTERIA: CPT

## 2022-12-28 PROCEDURE — 93005 ELECTROCARDIOGRAM TRACING: CPT

## 2022-12-28 PROCEDURE — 36415 COLL VENOUS BLD VENIPUNCTURE: CPT

## 2022-12-28 PROCEDURE — 96375 TX/PRO/DX INJ NEW DRUG ADDON: CPT

## 2022-12-28 PROCEDURE — 96376 TX/PRO/DX INJ SAME DRUG ADON: CPT

## 2022-12-28 PROCEDURE — 96374 THER/PROPH/DIAG INJ IV PUSH: CPT

## 2022-12-28 PROCEDURE — 81003 URINALYSIS AUTO W/O SCOPE: CPT

## 2022-12-28 PROCEDURE — 80307 DRUG TEST PRSMV CHEM ANLYZR: CPT

## 2022-12-28 PROCEDURE — 85610 PROTHROMBIN TIME: CPT

## 2022-12-28 PROCEDURE — 84484 ASSAY OF TROPONIN QUANT: CPT

## 2022-12-28 PROCEDURE — 84436 ASSAY OF TOTAL THYROXINE: CPT

## 2022-12-28 PROCEDURE — 83735 ASSAY OF MAGNESIUM: CPT

## 2022-12-28 PROCEDURE — 87086 URINE CULTURE/COLONY COUNT: CPT

## 2022-12-28 PROCEDURE — 0225U NFCT DS DNA&RNA 21 SARSCOV2: CPT

## 2022-12-28 PROCEDURE — 84443 ASSAY THYROID STIM HORMONE: CPT

## 2022-12-28 PROCEDURE — 83036 HEMOGLOBIN GLYCOSYLATED A1C: CPT

## 2022-12-28 RX ADMIN — Medication 1.5 MILLIGRAM(S): at 10:27

## 2022-12-28 RX ADMIN — ENOXAPARIN SODIUM 40 MILLIGRAM(S): 100 INJECTION SUBCUTANEOUS at 05:22

## 2022-12-28 RX ADMIN — Medication 100 MILLIGRAM(S): at 05:22

## 2022-12-28 RX ADMIN — Medication 1.5 MILLIGRAM(S): at 13:58

## 2022-12-28 RX ADMIN — Medication 1 TABLET(S): at 13:58

## 2022-12-28 RX ADMIN — PIPERACILLIN AND TAZOBACTAM 25 GRAM(S): 4; .5 INJECTION, POWDER, LYOPHILIZED, FOR SOLUTION INTRAVENOUS at 13:58

## 2022-12-28 RX ADMIN — Medication 2 MILLIGRAM(S): at 05:25

## 2022-12-28 RX ADMIN — PIPERACILLIN AND TAZOBACTAM 25 GRAM(S): 4; .5 INJECTION, POWDER, LYOPHILIZED, FOR SOLUTION INTRAVENOUS at 05:19

## 2022-12-28 RX ADMIN — Medication 2 MILLIGRAM(S): at 14:34

## 2022-12-28 RX ADMIN — Medication 100 MILLIGRAM(S): at 13:58

## 2022-12-28 RX ADMIN — Medication 1 MILLIGRAM(S): at 13:58

## 2022-12-28 NOTE — DISCHARGE NOTE PROVIDER - ATTENDING DISCHARGE PHYSICAL EXAMINATION:
Vital Signs Last 24 Hrs  T(C): 36.4 (28 Dec 2022 09:00), Max: 36.4 (28 Dec 2022 09:00)  T(F): 97.5 (28 Dec 2022 09:00), Max: 97.5 (28 Dec 2022 09:00)  HR: 91 (28 Dec 2022 09:00) (91 - 91)  BP: 113/81 (28 Dec 2022 09:00) (113/81 - 113/81)  BP(mean): 91 (28 Dec 2022 09:00) (91 - 91)  RR: 17 (28 Dec 2022 09:00) (17 - 17)  SpO2: 92% (28 Dec 2022 09:00) (92% - 92%)    Parameters below as of 28 Dec 2022 09:00  Patient On (Oxygen Delivery Method): room air    GENERAL: pt examined bedside, laying comfortably in bed in NAD  HEENT: NC/AT, moist oral mucosa, clear conjunctiva, sclera nonicteric  RESPIRATORY: Normal respiratory effort; CTA b/l, no wheezing, rhonchi, rales  CARDIOVASCULAR: RRR, normal S1 and S2, no murmur/rub/gallop  ABDOMEN: soft, NT/ND, normoactive bowel sounds, no rebound/guarding  MSK: No joint deformities, edema, erythema  EXTREMITIES: No cynaosis, no clubbing, no lower extremity edema; Peripheral pulses are 2+ bilaterally  PSYCH: affect appropriate and cooperative  NEUROLOGY: A+O to person, place, and time, no focal neurologic deficits appreciated   SKIN: No rashes or no palpable lesions

## 2022-12-28 NOTE — CHART NOTE - NSCHARTNOTEFT_GEN_A_CORE
RN called reporting pt would like to sign out AMA.  Pt seen with , Alirio.  Pt voices that he would like to leave, he does not want to be here any longer.  He is A & O x 4. He has spoken to SW and given an address he will be going to. They are providing him with bus passes. I explained to pt that he has not been discharged by the doctor and by leaving he is putting his health at risk, including worsening infection and death. Pt voices understanding and signed AMA form. Dr. Talbert aware.

## 2023-01-01 ENCOUNTER — NON-APPOINTMENT (OUTPATIENT)
Age: 28
End: 2023-01-01

## 2023-01-01 ENCOUNTER — APPOINTMENT (OUTPATIENT)
Dept: CARDIOLOGY | Facility: CLINIC | Age: 28
End: 2023-01-01
Payer: OTHER GOVERNMENT

## 2023-01-01 VITALS
WEIGHT: 113 LBS | BODY MASS INDEX: 16.74 KG/M2 | DIASTOLIC BLOOD PRESSURE: 70 MMHG | SYSTOLIC BLOOD PRESSURE: 102 MMHG | OXYGEN SATURATION: 100 % | HEART RATE: 93 BPM | HEIGHT: 69 IN

## 2023-01-01 VITALS — DIASTOLIC BLOOD PRESSURE: 76 MMHG | SYSTOLIC BLOOD PRESSURE: 106 MMHG

## 2023-01-01 DIAGNOSIS — R55 SYNCOPE AND COLLAPSE: ICD-10-CM

## 2023-01-01 DIAGNOSIS — Z87.891 PERSONAL HISTORY OF NICOTINE DEPENDENCE: ICD-10-CM

## 2023-01-01 DIAGNOSIS — Z83.3 FAMILY HISTORY OF DIABETES MELLITUS: ICD-10-CM

## 2023-01-01 DIAGNOSIS — R10.9 UNSPECIFIED ABDOMINAL PAIN: ICD-10-CM

## 2023-01-01 DIAGNOSIS — Z09 ENCOUNTER FOR FOLLOW-UP EXAMINATION AFTER COMPLETED TREATMENT FOR CONDITIONS OTHER THAN MALIGNANT NEOPLASM: ICD-10-CM

## 2023-01-01 DIAGNOSIS — Z87.898 PERSONAL HISTORY OF OTHER SPECIFIED CONDITIONS: ICD-10-CM

## 2023-01-01 PROCEDURE — 93000 ELECTROCARDIOGRAM COMPLETE: CPT

## 2023-01-01 PROCEDURE — 99203 OFFICE O/P NEW LOW 30 MIN: CPT

## 2023-01-01 RX ORDER — ONDANSETRON 4 MG/1
4 TABLET, ORALLY DISINTEGRATING ORAL
Refills: 0 | Status: ACTIVE | COMMUNITY

## 2023-01-01 RX ORDER — IBUPROFEN 200 MG/1
TABLET, FILM COATED ORAL
Refills: 0 | Status: ACTIVE | COMMUNITY

## 2023-01-19 ENCOUNTER — EMERGENCY (EMERGENCY)
Facility: HOSPITAL | Age: 28
LOS: 1 days | Discharge: HOME | End: 2023-01-19
Attending: EMERGENCY MEDICINE
Payer: MEDICAID

## 2023-01-19 VITALS
OXYGEN SATURATION: 97 % | HEIGHT: 65 IN | HEART RATE: 107 BPM | TEMPERATURE: 98 F | RESPIRATION RATE: 22 BRPM | SYSTOLIC BLOOD PRESSURE: 114 MMHG | DIASTOLIC BLOOD PRESSURE: 80 MMHG

## 2023-01-19 VITALS — SYSTOLIC BLOOD PRESSURE: 120 MMHG | DIASTOLIC BLOOD PRESSURE: 74 MMHG | HEART RATE: 98 BPM

## 2023-01-19 DIAGNOSIS — S52.209A UNSPECIFIED FRACTURE OF SHAFT OF UNSPECIFIED ULNA, INITIAL ENCOUNTER FOR CLOSED FRACTURE: Chronic | ICD-10-CM

## 2023-01-19 DIAGNOSIS — Z98.890 OTHER SPECIFIED POSTPROCEDURAL STATES: Chronic | ICD-10-CM

## 2023-01-19 DIAGNOSIS — Z90.49 ACQUIRED ABSENCE OF OTHER SPECIFIED PARTS OF DIGESTIVE TRACT: Chronic | ICD-10-CM

## 2023-01-19 PROCEDURE — 99285 EMERGENCY DEPT VISIT HI MDM: CPT

## 2023-01-19 PROCEDURE — 99284 EMERGENCY DEPT VISIT MOD MDM: CPT

## 2023-01-19 RX ORDER — SODIUM CHLORIDE 9 MG/ML
1000 INJECTION, SOLUTION INTRAVENOUS ONCE
Refills: 0 | Status: DISCONTINUED | OUTPATIENT
Start: 2023-01-19 | End: 2023-01-27

## 2023-01-19 RX ORDER — ONDANSETRON 8 MG/1
8 TABLET, FILM COATED ORAL ONCE
Refills: 0 | Status: COMPLETED | OUTPATIENT
Start: 2023-01-19 | End: 2023-01-19

## 2023-01-19 RX ADMIN — ONDANSETRON 8 MILLIGRAM(S): 8 TABLET, FILM COATED ORAL at 15:59

## 2023-01-19 NOTE — ED PROVIDER NOTE - PHYSICAL EXAMINATION
Alert, lucid, and in no apparent distress. Pt is normocephalic, atraumatic.  Pupils are equal, round, lips pink, moist mucous membranes, tongue midline. Neck supple.   Lungs clear to auscultation. Heart regular rate and rhythm, normal S1, S2,   Abdomen is soft, nontender, no pulsatile mass, no masses, no distension, no rebound.   Non-focal sensory, 5 out of 5 motor strength, no dysmetria, fluent, goal directed speech. CN2 to 12 intact. Skin without rash   Normal mentation, does not appear agitated

## 2023-01-19 NOTE — ED ADULT TRIAGE NOTE - DIRECT TO ROOM CARE INITIATED:
Assessment    1  Well child visit (V20 2) (Z00 129)    Discussion/Summary    Impression:   No growth, development, elimination, feeding, skin and sleep concerns  no medical problems  Anticipatory guidance addressed as per the history of present illness section  No vaccines needed  No medication changes at this time  Chief Complaint  HM      History of Present Illness  HM, 6-8 years (Brief): Sam Cruz presents today for routine health maintenance with his mother  Social History: He lives with his parent(s) and sister  His parents are   dad works outside the home  General Health: The last health maintenance visit was 1 years ago  (1)  The child's health since the last visit is described as good  Dental hygiene: Good  Caregiver concerns:   Caregivers deny concerns regarding nutrition, sleep, behavior, school, development and elimination  Nutrition/Elimination:   Diet:  the child's current diet is diverse and healthy  Dietary supplements:  The patient does not use dietary supplements  Elimination:  No elimination issues are expressed  Sleep:  No sleep issues are reported  Behavior: The child's temperament is described as calm and happy  Health Risks:  No significant risk factors are identified  no tuberculosis risk factors  no lead poisoning risk factors  Childcare/School: He is in   School performance has been excellent  HPI: Had several episodes of hives latter part of summer, no obvious etiology  Scheduled to see Dr Donta Hayes in February  Using Claritin daily now with good allergy control  Had 2 episodes of L knee giving out, last time when on the trampoline  Resolved after keeping at rest for a day or so      Review of Systems    Constitutional: no fever  Eyes: no eye pain  Cardiovascular: no chest pain and no palpitations  Respiratory: no shortness of breath  Gastrointestinal: no abdominal pain  Genitourinary: no testicular pain     Musculoskeletal:
limb pain, but as noted in HPI  Integumentary: a rash, but as noted in HPI  Neurological: no headache  Psychiatric: no anxiety and no sleep disturbances  Hematologic/Lymphatic: no tendency for easy bruising  Active Problems    1  Acute otitis media (382 9) (H66 90)   2  Acute streptococcal pharyngitis (034 0) (J02 0)   3  Flu vaccine need (V04 81) (Z23)   4  Need for DTP vaccine (V06 1) (Z23)   5  Need for hepatitis A immunization (V05 3) (Z23)   6  Need for MMR vaccine (V06 4) (Z23)   7  Need for varicella vaccine (V05 4) (Z23)   8  Tick bite (919 4,E906 4) (W57 XXXA)    Past Medical History    · History of Acute upper respiratory infection (465 9) (J06 9)   · History of Aphthous ulcer (528 2) (K12 0)   · History of Contact dermatitis (692 9) (L25 9)   · History of Croup (464 4) (J05 0)   · History of Failure To Thrive In Childhood (783 41)   · History of acute conjunctivitis (V12 49) (Z86 69)   · History of erythema multiforme (V13 3) (Z87 2)   · History of upper respiratory infection (V12 09) (Z87 09)   · History of viral infection (V12 09) (Z86 19)   · History of Leg Injury (959 7)    Family History  Mother    · Family history of Intolerance To Milk Products   · Family history of Irritable Bowel Syndrome    Allergies    1  No Known Drug Allergies    Vitals   Recorded: [id number] 43:82AC   Systolic 88   Diastolic 52   Heart Rate 88   Temperature 97 3 F, Tympanic   Height 3 ft 7 7 in   Weight 39 lb 9 6 oz   BMI Calculated 14 58     Physical Exam    Constitutional - General appearance: No acute distress, well appearing and well nourished  Eyes - Conjunctiva and lids: No injection, edema or discharge  Ears, Nose, Mouth, and Throat - External inspection of ears and nose: Normal without deformities or discharge  Otoscopic examination: Tympanic membranes gray, translucent with good bony landmarks and light reflex  Canals patent without erythema   Hearing: Normal  Nasal mucosa, septum, and turbinates:
Normal, no edema or discharge  Lips, teeth, and gums: Normal, good dentition  Oropharynx: Moist mucosa, normal tongue and tonsils without lesions  Neck - Examination of the neck: Supple, symmetric, no masses  Examination of the thyroid: No thyromegaly  Pulmonary - Respiratory effort: Normal respiratory rate and rhythm, no increased work of breathing  Auscultation of lungs: Clear bilaterally  Cardiovascular - Auscultation of heart: Regular rate and rhythm, normal S1 and S2, no murmur  Abdominal aorta: Normal  Peripheral vascular exam: Normal  Examination of extremities for edema and/or varicosities: Normal    Chest - Other chest findings: Normal    Abdomen - Examination of abdomen: Normal bowel sounds, soft, non-tender, no masses  Examination of liver and spleen: No hepatomegaly or splenomegaly  Genitourinary - Examination of scrotal contents: Normal, no masses appreciated  Examination of the penis: Normal, no lesions appreciated  Lymphatic - Palpation of lymph nodes in neck: No anterior or posterior cervical lymphadenopathy  Musculoskeletal - Gait and station: Normal gait  Digits and nails: Normal without clubbing or cyanosis  Examination of joints, bones, and muscles: Normal  Evaluation for scoliosis: no scoliosis on exam  Range of motion: Normal  Stability: No joint instability  Muscle strength/tone: Normal    Skin - Skin and subcutaneous tissue: No rash or lesions     Psychiatric - judgment and insight: Normal  Orientation to person, place, and time: Normal  Recent and remote memory: Normal  Mood and affect: Normal       Future Appointments    Date/Time Provider Specialty Site   11/20/2017 04:20 PM Neeta Clark MD Family Medicine Saeed Centeno MD     Signatures   Electronically signed by : Donovan Balderas MD; Nov 15 2016  8:17PM EST                       (Author)
19-Jan-2023 15:40
yes

## 2023-01-19 NOTE — ED ADULT TRIAGE NOTE - CHIEF COMPLAINT QUOTE
27 M nad in [employer] custody brought in for fit for confinement. Pt c/o abdominal pain and nausea s/p heroin withdrawal, last use yesterday. Seen by Dr Can in triage.

## 2023-01-19 NOTE — ED PROVIDER NOTE - NSFOLLOWUPINSTRUCTIONS_ED_ALL_ED_FT
zofran 4mg by mouth every 6 hours  avoid all drugs and alcohol   follow up with doctor in 3 - 5 days avoid all drugs and alcohol   follow up with doctor in 3 - 5 days

## 2023-01-19 NOTE — ED PROVIDER NOTE - OBJECTIVE STATEMENT
28 yo male pmh heroin abuse brought in by [employer] for fit for confinement; pt last used 2 bags yesterday;  pt noted with abdominal pain without vomiting; pt denies fever, chills or diarrhea

## 2023-01-19 NOTE — ED PROVIDER NOTE - PATIENT PORTAL LINK FT
You can access the FollowMyHealth Patient Portal offered by [facility] by registering at the following website: [email or web address]. By joining [employer]’s FollowMyHealth portal, you will also be able to view your health information using other applications (apps) compatible with our system.

## 2023-01-19 NOTE — ED PROVIDER NOTE - CLINICAL SUMMARY MEDICAL DECISION MAKING FREE TEXT BOX
heroin abuse with abdominal pain; tx for withdrawal 28 yo male with hi/o heroin abuse with abdominal pain; tx for withdrawal, antiemetic clonidine and fit for confinement

## 2023-01-21 ENCOUNTER — EMERGENCY (EMERGENCY)
Facility: HOSPITAL | Age: 28
LOS: 1 days | Discharge: HOME | End: 2023-01-21
Attending: EMERGENCY MEDICINE
Payer: MEDICAID

## 2023-01-21 VITALS
DIASTOLIC BLOOD PRESSURE: 74 MMHG | SYSTOLIC BLOOD PRESSURE: 121 MMHG | OXYGEN SATURATION: 96 % | TEMPERATURE: 98 F | RESPIRATION RATE: 16 BRPM | HEART RATE: 108 BPM | WEIGHT: 139.99 LBS | HEIGHT: 65 IN

## 2023-01-21 DIAGNOSIS — Z90.49 ACQUIRED ABSENCE OF OTHER SPECIFIED PARTS OF DIGESTIVE TRACT: Chronic | ICD-10-CM

## 2023-01-21 DIAGNOSIS — Z98.890 OTHER SPECIFIED POSTPROCEDURAL STATES: Chronic | ICD-10-CM

## 2023-01-21 DIAGNOSIS — S52.209A UNSPECIFIED FRACTURE OF SHAFT OF UNSPECIFIED ULNA, INITIAL ENCOUNTER FOR CLOSED FRACTURE: Chronic | ICD-10-CM

## 2023-01-21 PROCEDURE — 99284 EMERGENCY DEPT VISIT MOD MDM: CPT

## 2023-01-21 PROCEDURE — 99282 EMERGENCY DEPT VISIT SF MDM: CPT

## 2023-01-21 NOTE — ED PROVIDER NOTE - PATIENT PORTAL LINK FT
You can access the FollowMyHealth Patient Portal offered by [facility] by registering at the following website: [email or web address]. By joining [employer]’s FollowMyHealth portal, you will also be able to view your health information using other applications (apps) compatible with our system.

## 2023-01-21 NOTE — ED ADULT TRIAGE NOTE - MEANS OF ARRIVAL
Patient: Dai Izquierdo    Procedure: Procedure(s):  Endoscopic-assisted transgastric strictureplasty with gastrostomy tube placement, intepretation of fluroscopy       Anesthesia Type:  No value filed.    Note:  Disposition: Outpatient   Postop Pain Control: Uneventful            Sign Out: Well controlled pain   PONV: No   Neuro/Psych: Uneventful            Sign Out: Acceptable/Baseline neuro status   Airway/Respiratory: Uneventful            Sign Out: Acceptable/Baseline resp. status   CV/Hemodynamics: Uneventful            Sign Out: Acceptable CV status; No obvious hypovolemia; No obvious fluid overload   Other NRE: NONE   DID A NON-ROUTINE EVENT OCCUR? No           Last vitals:  Vitals Value Taken Time   /56 08/19/22 1330   Temp     Pulse 67 08/19/22 1338   Resp 13 08/19/22 1338   SpO2 96 % 08/19/22 1338   Vitals shown include unvalidated device data.    Electronically Signed By: Hong Stone MD  August 19, 2022  1:40 PM  
ambulatory

## 2023-01-21 NOTE — ED ADULT NURSE NOTE - ASSOCIATED SYMPTOMS
Ambulatory to ED c/o generalized abdominal pain and nausea x3 days. Patient recently evaluated in [facility] ED w/ similar complaints. Patient states he last used heroin 3 days ago. HX Pancreatitis and ETOH abuse.

## 2023-01-21 NOTE — ED PROVIDER NOTE - OBJECTIVE STATEMENT
27y Male PMHx drug abuse, ETOH abuse, gastritis, reflux esophagitis, pancreatitis, hx of cholecystectomy presents to the ED complaining of abdominal pain. Pt has been having abdominal pain with vomiting since yesterday. The abdominal pain is diffuse and the vomiting is bilious with streaks of blood in it. Pt admits to 20 plus episodes of vomiting. Pt recently was here and brought in by [place] police for the same complaint and was given clonidine and Zofran, which the pt said helped alleviate his sx. Pt last BM was yesterday and is passing flatus. Pt is a heroin user and states he used a "dime" today and yesterday and has been using it because he does not have his Suboxone. Denies recent travel, sick contacts, SOB, chest pain, diarrhea, constipation, hematochezia, melena.

## 2023-01-21 NOTE — ED ADULT TRIAGE NOTE - CHIEF COMPLAINT QUOTE
Ambulatory to ED c/o generalized abdominal pain and nausea x3 days. Patient recently evaluated in [facility] ED w/ similar complaints. Patient states he last used heroin 3 days ago. HX Pancreatitis and ETOH abuse.

## 2023-01-21 NOTE — ED ADULT NURSE NOTE - CHIEF COMPLAINT QUOTE
Ambulatory to ED c/o generalized abdominal pain and nausea x3 days. Patient recently evaluated in [facility] ED w/ similar complaints. Patient states he last used heroin 3 days ago. HX Pancreatitis and ETOH abuse.

## 2023-01-21 NOTE — ED PROVIDER NOTE - CLINICAL SUMMARY MEDICAL DECISION MAKING FREE TEXT BOX
patient with abd pain and nausea related to heroin abuse; at time of my evaluation, patient is seen ambulating throughout the ED, drinking tea, and requesting bus tickets, denying other active complaints, and requesting to leave; patient is in NAD, ambulating easily, no resp distress; no neuro deficits; patient received bus tickets from RN and left the ED prior to signing any papers.

## 2023-01-21 NOTE — ED ADULT NURSE NOTE - OBJECTIVE STATEMENT
Ambulatory to ED c/o generalized abdominal pain and nausea x3 days. Patient recently evaluated in [facility] ED w/ similar complaints. Patient states he last used heroin 3 days ago. HX Pancreatitis and ETOH abuse.

## 2023-03-01 ENCOUNTER — EMERGENCY (EMERGENCY)
Facility: HOSPITAL | Age: 28
LOS: 1 days | Discharge: HOME | End: 2023-03-01
Attending: EMERGENCY MEDICINE
Payer: MEDICAID

## 2023-03-01 VITALS
WEIGHT: 119.93 LBS | OXYGEN SATURATION: 95 % | SYSTOLIC BLOOD PRESSURE: 141 MMHG | RESPIRATION RATE: 24 BRPM | HEART RATE: 143 BPM | DIASTOLIC BLOOD PRESSURE: 90 MMHG

## 2023-03-01 DIAGNOSIS — Z90.49 ACQUIRED ABSENCE OF OTHER SPECIFIED PARTS OF DIGESTIVE TRACT: Chronic | ICD-10-CM

## 2023-03-01 DIAGNOSIS — Z98.890 OTHER SPECIFIED POSTPROCEDURAL STATES: Chronic | ICD-10-CM

## 2023-03-01 DIAGNOSIS — S52.209A UNSPECIFIED FRACTURE OF SHAFT OF UNSPECIFIED ULNA, INITIAL ENCOUNTER FOR CLOSED FRACTURE: Chronic | ICD-10-CM

## 2023-03-01 LAB
ALBUMIN SERPL ELPH-MCNC: 4.8 G/DL — SIGNIFICANT CHANGE UP (ref 3.3–5.2)
ALP SERPL-CCNC: 160 U/L — HIGH (ref 40–120)
ALT FLD-CCNC: 8 U/L — SIGNIFICANT CHANGE UP
ANION GAP SERPL CALC-SCNC: 23 MMOL/L — HIGH (ref 5–17)
APAP SERPL-MCNC: <3 UG/ML — LOW (ref 10–26)
AST SERPL-CCNC: 20 U/L — SIGNIFICANT CHANGE UP
BASOPHILS # BLD AUTO: 0.03 K/UL — SIGNIFICANT CHANGE UP (ref 0–0.2)
BASOPHILS NFR BLD AUTO: 0.1 % — SIGNIFICANT CHANGE UP (ref 0–2)
BILIRUB SERPL-MCNC: 0.5 MG/DL — SIGNIFICANT CHANGE UP (ref 0.4–2)
BUN SERPL-MCNC: 15.4 MG/DL — SIGNIFICANT CHANGE UP (ref 8–20)
CALCIUM SERPL-MCNC: 10.3 MG/DL — SIGNIFICANT CHANGE UP (ref 8.4–10.5)
CHLORIDE SERPL-SCNC: 100 MMOL/L — SIGNIFICANT CHANGE UP (ref 96–108)
CO2 SERPL-SCNC: 20 MMOL/L — LOW (ref 22–29)
CREAT SERPL-MCNC: 0.81 MG/DL — SIGNIFICANT CHANGE UP (ref 0.5–1.3)
EGFR: 122 ML/MIN/1.73M2 — SIGNIFICANT CHANGE UP
EOSINOPHIL # BLD AUTO: 0 K/UL — SIGNIFICANT CHANGE UP (ref 0–0.5)
EOSINOPHIL NFR BLD AUTO: 0 % — SIGNIFICANT CHANGE UP (ref 0–6)
ETHANOL SERPL-MCNC: <10 MG/DL — SIGNIFICANT CHANGE UP (ref 0–9)
GLUCOSE SERPL-MCNC: 148 MG/DL — HIGH (ref 70–99)
HCT VFR BLD CALC: 47.6 % — SIGNIFICANT CHANGE UP (ref 39–50)
HGB BLD-MCNC: 16.2 G/DL — SIGNIFICANT CHANGE UP (ref 13–17)
IMM GRANULOCYTES NFR BLD AUTO: 0.6 % — SIGNIFICANT CHANGE UP (ref 0–0.9)
LYMPHOCYTES # BLD AUTO: 1.75 K/UL — SIGNIFICANT CHANGE UP (ref 1–3.3)
LYMPHOCYTES # BLD AUTO: 8 % — LOW (ref 13–44)
MCHC RBC-ENTMCNC: 29.9 PG — SIGNIFICANT CHANGE UP (ref 27–34)
MCHC RBC-ENTMCNC: 34 GM/DL — SIGNIFICANT CHANGE UP (ref 32–36)
MCV RBC AUTO: 88 FL — SIGNIFICANT CHANGE UP (ref 80–100)
MONOCYTES # BLD AUTO: 0.68 K/UL — SIGNIFICANT CHANGE UP (ref 0–0.9)
MONOCYTES NFR BLD AUTO: 3.1 % — SIGNIFICANT CHANGE UP (ref 2–14)
NEUTROPHILS # BLD AUTO: 19.21 K/UL — HIGH (ref 1.8–7.4)
NEUTROPHILS NFR BLD AUTO: 88.2 % — HIGH (ref 43–77)
PLATELET # BLD AUTO: 497 K/UL — HIGH (ref 150–400)
POTASSIUM SERPL-MCNC: 4.2 MMOL/L — SIGNIFICANT CHANGE UP (ref 3.5–5.3)
POTASSIUM SERPL-SCNC: 4.2 MMOL/L — SIGNIFICANT CHANGE UP (ref 3.5–5.3)
PROT SERPL-MCNC: 8.1 G/DL — SIGNIFICANT CHANGE UP (ref 6.6–8.7)
RBC # BLD: 5.41 M/UL — SIGNIFICANT CHANGE UP (ref 4.2–5.8)
RBC # FLD: 12.9 % — SIGNIFICANT CHANGE UP (ref 10.3–14.5)
SALICYLATES SERPL-MCNC: <0.6 MG/DL — LOW (ref 10–20)
SODIUM SERPL-SCNC: 143 MMOL/L — SIGNIFICANT CHANGE UP (ref 135–145)
WBC # BLD: 21.8 K/UL — HIGH (ref 3.8–10.5)
WBC # FLD AUTO: 21.8 K/UL — HIGH (ref 3.8–10.5)

## 2023-03-01 PROCEDURE — 70450 CT HEAD/BRAIN W/O DYE: CPT | Mod: 26,MA

## 2023-03-01 PROCEDURE — 74177 CT ABD & PELVIS W/CONTRAST: CPT | Mod: 26,MA

## 2023-03-01 PROCEDURE — 93010 ELECTROCARDIOGRAM REPORT: CPT

## 2023-03-01 PROCEDURE — 99285 EMERGENCY DEPT VISIT HI MDM: CPT

## 2023-03-01 RX ORDER — ONDANSETRON 8 MG/1
4 TABLET, FILM COATED ORAL ONCE
Refills: 0 | Status: COMPLETED | OUTPATIENT
Start: 2023-03-01 | End: 2023-03-01

## 2023-03-01 RX ORDER — MIDAZOLAM HYDROCHLORIDE 1 MG/ML
10 INJECTION, SOLUTION INTRAMUSCULAR; INTRAVENOUS ONCE
Refills: 0 | Status: DISCONTINUED | OUTPATIENT
Start: 2023-03-01 | End: 2023-03-01

## 2023-03-01 RX ADMIN — ONDANSETRON 4 MILLIGRAM(S): 8 TABLET, FILM COATED ORAL at 21:51

## 2023-03-01 RX ADMIN — MIDAZOLAM HYDROCHLORIDE 10 MILLIGRAM(S): 1 INJECTION, SOLUTION INTRAMUSCULAR; INTRAVENOUS at 19:40

## 2023-03-01 RX ADMIN — Medication 1 MILLIGRAM(S): at 21:22

## 2023-03-01 NOTE — ED ADULT NURSE NOTE - OBJECTIVE STATEMENT
pt came in through walk in triage with a bloody nose and then collapsed in waiting room pt brought to critical care and then became agitated, aggressive and yelling c/o abd pain MD hoffman called to bedside and  nikolai called to bedside for safety  , pt admits to taking heroin and crack tonight, resp even and unlabored no distress noted, abd soft and tender to palpitation

## 2023-03-01 NOTE — ED ADULT NURSE REASSESSMENT NOTE - NS ED NURSE REASSESS COMMENT FT1
patient began actively vomiting several times, dr. hoffman made aware, zofran given as ordered and patient brought for CT abdomen.

## 2023-03-01 NOTE — ED PROVIDER NOTE - CLINICAL SUMMARY MEDICAL DECISION MAKING FREE TEXT BOX
control agitation, check labs ecg and head ct to eval for metabolic derrangement or traumatioc injury    ECG: sinus tachy, 110, no uischemia, normal intervals

## 2023-03-01 NOTE — ED PROVIDER NOTE - PROGRESS NOTE DETAILS
patient intoxicated, unable to give consent, patient c/o abd pain in setting of crackcocaine abuse, plan for CT abd/pel with IV contrast, please perform CT as medical necessity. CTs reviewed; improvement in hiatal hernia; patient now reassessed, and now awake and alert, calm and cooperative, requesting assistance with housing; demonstrates clinical sobriety, will hold for SW this morning. Pt with abnormal CT yeserday will repeat now, patient had fall unwitnessed, awake complaining of abd pain CT abd showed enteritis. FS normal, will give hydration. PRIORITY CT now for possible head injury -Felipe SULTANA Pt again attempted to get out of bed by crawling over the railing and fell. no sign of head trauma. patient states he has pain and is tired and wants to sleep then became aggressive, kick the RN. SHAHID DRUMMOND called, patient restrained. Tried verbal de-escalation but once patient was released again tried to kick staff, stating 'I just want to sleep' for patient and staff safety given 10mg IM versed. placed on 1:1 -Felipe DO patient states he wants to kill himself, threw himself off stretcher, states his stomach hurts, he wants to sleep and wants to die. put back in stretcher still combative given IV haldol/benadryl. Discussed with  placed in back on 1:1 -Felipe DO

## 2023-03-01 NOTE — ED PROVIDER NOTE - OBJECTIVE STATEMENT
28-arabella year old male unidentified, walked himself in through triage, c/o abd pain and then abruptly became agitated, fighting with staff, uncooperative, violent outbursts, unable to clarify history or complaint, but is found with drug paraphernalia on his persxon and admits to doing crack. Patient required sedation with IM versed to control agitation for safety to self

## 2023-03-01 NOTE — ED ADULT TRIAGE NOTE - CHIEF COMPLAINT QUOTE
unclear injury, walked into waiting room and collapased. direct to ambulance unresponsive. blood to under nose

## 2023-03-01 NOTE — ED ADULT NURSE REASSESSMENT NOTE - NS ED NURSE REASSESS COMMENT FT1
received patient from critical care RNs, patient in yellow gown, belongings secured PTA in B5L. patient placed on , VSS at this time. patient safety maintained.

## 2023-03-02 LAB
APPEARANCE UR: CLEAR — SIGNIFICANT CHANGE UP
BACTERIA # UR AUTO: ABNORMAL
BASOPHILS # BLD AUTO: 0.03 K/UL — SIGNIFICANT CHANGE UP (ref 0–0.2)
BASOPHILS NFR BLD AUTO: 0.1 % — SIGNIFICANT CHANGE UP (ref 0–2)
BILIRUB UR-MCNC: NEGATIVE — SIGNIFICANT CHANGE UP
COLOR SPEC: YELLOW — SIGNIFICANT CHANGE UP
COMMENT - URINE: SIGNIFICANT CHANGE UP
DIFF PNL FLD: NEGATIVE — SIGNIFICANT CHANGE UP
EOSINOPHIL # BLD AUTO: 0 K/UL — SIGNIFICANT CHANGE UP (ref 0–0.5)
EOSINOPHIL NFR BLD AUTO: 0 % — SIGNIFICANT CHANGE UP (ref 0–6)
EPI CELLS # UR: SIGNIFICANT CHANGE UP
GLUCOSE UR QL: NEGATIVE MG/DL — SIGNIFICANT CHANGE UP
HCT VFR BLD CALC: 47.5 % — SIGNIFICANT CHANGE UP (ref 39–50)
HGB BLD-MCNC: 16.4 G/DL — SIGNIFICANT CHANGE UP (ref 13–17)
IMM GRANULOCYTES NFR BLD AUTO: 0.4 % — SIGNIFICANT CHANGE UP (ref 0–0.9)
KETONES UR-MCNC: ABNORMAL
LEUKOCYTE ESTERASE UR-ACNC: NEGATIVE — SIGNIFICANT CHANGE UP
LYMPHOCYTES # BLD AUTO: 1.9 K/UL — SIGNIFICANT CHANGE UP (ref 1–3.3)
LYMPHOCYTES # BLD AUTO: 8.4 % — LOW (ref 13–44)
MCHC RBC-ENTMCNC: 29.9 PG — SIGNIFICANT CHANGE UP (ref 27–34)
MCHC RBC-ENTMCNC: 34.5 GM/DL — SIGNIFICANT CHANGE UP (ref 32–36)
MCV RBC AUTO: 86.5 FL — SIGNIFICANT CHANGE UP (ref 80–100)
MONOCYTES # BLD AUTO: 1.07 K/UL — HIGH (ref 0–0.9)
MONOCYTES NFR BLD AUTO: 4.7 % — SIGNIFICANT CHANGE UP (ref 2–14)
NEUTROPHILS # BLD AUTO: 19.44 K/UL — HIGH (ref 1.8–7.4)
NEUTROPHILS NFR BLD AUTO: 86.4 % — HIGH (ref 43–77)
NITRITE UR-MCNC: NEGATIVE — SIGNIFICANT CHANGE UP
PCP SPEC-MCNC: SIGNIFICANT CHANGE UP
PH UR: 6.5 — SIGNIFICANT CHANGE UP (ref 5–8)
PLATELET # BLD AUTO: 443 K/UL — HIGH (ref 150–400)
PROT UR-MCNC: 15
RBC # BLD: 5.49 M/UL — SIGNIFICANT CHANGE UP (ref 4.2–5.8)
RBC # FLD: 13 % — SIGNIFICANT CHANGE UP (ref 10.3–14.5)
RBC CASTS # UR COMP ASSIST: SIGNIFICANT CHANGE UP /HPF (ref 0–4)
SARS-COV-2 RNA SPEC QL NAA+PROBE: SIGNIFICANT CHANGE UP
SP GR SPEC: 1.01 — SIGNIFICANT CHANGE UP (ref 1.01–1.02)
UROBILINOGEN FLD QL: NEGATIVE MG/DL — SIGNIFICANT CHANGE UP
WBC # BLD: 22.54 K/UL — HIGH (ref 3.8–10.5)
WBC # FLD AUTO: 22.54 K/UL — HIGH (ref 3.8–10.5)
WBC UR QL: SIGNIFICANT CHANGE UP /HPF (ref 0–5)

## 2023-03-02 PROCEDURE — 99223 1ST HOSP IP/OBS HIGH 75: CPT

## 2023-03-02 PROCEDURE — 93010 ELECTROCARDIOGRAM REPORT: CPT

## 2023-03-02 PROCEDURE — 70450 CT HEAD/BRAIN W/O DYE: CPT | Mod: 26,MA

## 2023-03-02 PROCEDURE — 71260 CT THORAX DX C+: CPT | Mod: 26,MA

## 2023-03-02 PROCEDURE — 72125 CT NECK SPINE W/O DYE: CPT | Mod: 26,MA

## 2023-03-02 RX ORDER — HALOPERIDOL DECANOATE 100 MG/ML
2 INJECTION INTRAMUSCULAR ONCE
Refills: 0 | Status: COMPLETED | OUTPATIENT
Start: 2023-03-02 | End: 2023-03-02

## 2023-03-02 RX ORDER — MIDAZOLAM HYDROCHLORIDE 1 MG/ML
10 INJECTION, SOLUTION INTRAMUSCULAR; INTRAVENOUS ONCE
Refills: 0 | Status: DISCONTINUED | OUTPATIENT
Start: 2023-03-02 | End: 2023-03-02

## 2023-03-02 RX ORDER — ACETAMINOPHEN 500 MG
1000 TABLET ORAL ONCE
Refills: 0 | Status: COMPLETED | OUTPATIENT
Start: 2023-03-02 | End: 2023-03-02

## 2023-03-02 RX ORDER — MORPHINE SULFATE 50 MG/1
4 CAPSULE, EXTENDED RELEASE ORAL ONCE
Refills: 0 | Status: DISCONTINUED | OUTPATIENT
Start: 2023-03-02 | End: 2023-03-02

## 2023-03-02 RX ORDER — DIPHENHYDRAMINE HCL 50 MG
50 CAPSULE ORAL ONCE
Refills: 0 | Status: COMPLETED | OUTPATIENT
Start: 2023-03-02 | End: 2023-03-02

## 2023-03-02 RX ORDER — ONDANSETRON 8 MG/1
4 TABLET, FILM COATED ORAL ONCE
Refills: 0 | Status: COMPLETED | OUTPATIENT
Start: 2023-03-02 | End: 2023-03-02

## 2023-03-02 RX ORDER — SODIUM CHLORIDE 9 MG/ML
1000 INJECTION INTRAMUSCULAR; INTRAVENOUS; SUBCUTANEOUS ONCE
Refills: 0 | Status: COMPLETED | OUTPATIENT
Start: 2023-03-02 | End: 2023-03-02

## 2023-03-02 RX ADMIN — SODIUM CHLORIDE 1000 MILLILITER(S): 9 INJECTION INTRAMUSCULAR; INTRAVENOUS; SUBCUTANEOUS at 12:49

## 2023-03-02 RX ADMIN — Medication 400 MILLIGRAM(S): at 11:45

## 2023-03-02 RX ADMIN — HALOPERIDOL DECANOATE 2 MILLIGRAM(S): 100 INJECTION INTRAMUSCULAR at 17:09

## 2023-03-02 RX ADMIN — Medication 1000 MILLIGRAM(S): at 12:49

## 2023-03-02 RX ADMIN — MIDAZOLAM HYDROCHLORIDE 10 MILLIGRAM(S): 1 INJECTION, SOLUTION INTRAMUSCULAR; INTRAVENOUS at 13:06

## 2023-03-02 RX ADMIN — ONDANSETRON 4 MILLIGRAM(S): 8 TABLET, FILM COATED ORAL at 13:05

## 2023-03-02 RX ADMIN — MORPHINE SULFATE 4 MILLIGRAM(S): 50 CAPSULE, EXTENDED RELEASE ORAL at 00:35

## 2023-03-02 RX ADMIN — Medication 50 MILLIGRAM(S): at 17:09

## 2023-03-02 RX ADMIN — Medication 30 MILLILITER(S): at 20:04

## 2023-03-02 RX ADMIN — SODIUM CHLORIDE 1000 MILLILITER(S): 9 INJECTION INTRAMUSCULAR; INTRAVENOUS; SUBCUTANEOUS at 11:45

## 2023-03-02 RX ADMIN — Medication 1 TABLET(S): at 20:41

## 2023-03-02 NOTE — ED ADULT NURSE REASSESSMENT NOTE - NSIMPLEMENTINTERV_GEN_ALL_ED
Implemented All Fall with Harm Risk Interventions:  [place] to call system. Call bell, personal items and telephone within reach. Instruct patient to call for assistance. Room bathroom lighting operational. Non-slip footwear when patient is off stretcher. Physically safe environment: no spills, clutter or unnecessary equipment. Stretcher in lowest position, wheels locked, appropriate side rails in place. Provide visual cue, wrist band, yellow gown, etc. Monitor gait and stability. Monitor for mental status changes and reorient to person, place, and time. Review medications for side effects contributing to fall risk. Reinforce activity limits and safety measures with patient and family. Provide visual clues: red socks.

## 2023-03-02 NOTE — ED CDU PROVIDER INITIAL DAY NOTE - OBJECTIVE STATEMENT
28-arabella year old male unidentified, walked himself in through triage, c/o abd pain and then abruptly became agitated, fighting with staff, uncooperative, violent outbursts, unable to clarify history or complaint, but is found with drug paraphernalia on his persxon and admits to doing crack. Patient required sedation with IM versed to control agitation for safety to self. During ED stay attempts to hurt himself by throwing himself on the floor, states 'I just want to die' 'I want to sleep'

## 2023-03-02 NOTE — ED ADULT NURSE REASSESSMENT NOTE - NS ED NURSE REASSESS COMMENT FT1
received pt via stretcher s/p being medicated for agitation in the main ED. pt transferred from stretcher to bed on his own was awake to request warm blankets. pt was medically cleared by the ed attending dr littlejohn for transfer to  for psych consult s/p reporting that he was suicidal. pt is lethargic at this and unable to engage in an interview at this time. pt is on constant observation for his safety and staffs.

## 2023-03-02 NOTE — ED ADULT NURSE REASSESSMENT NOTE - NS ED NURSE REASSESS COMMENT FT1
Pt aggressive, combative and continuously trying to jump out of stretcher with force. Security and MD called to bedside. Pt screaming " I want to die,". Pt expressing violent behaviors to self and others. Pt medicated as per EMAR.

## 2023-03-02 NOTE — ED ADULT NURSE REASSESSMENT NOTE - NS ED NURSE REASSESS COMMENT FT1
patient agitated when being asked questions, unable to obtain much information other than name and birthday. chart updated by registration and patient requesting to speak to social work. dr. hoffman at bedside and agreeable with plan.

## 2023-03-02 NOTE — ED ADULT NURSE REASSESSMENT NOTE - NS ED NURSE REASSESS COMMENT FT1
Assumed care of pt at 0715 as stated in report from RN KG Charting as noted. Patient very lethargic, arouses to name and painful stimuli. Pt not answering LOC questions but states " I need clothes",, no s/s of pain/discomfort, no s/s of CP/SOB. Pt maintained in yellow gown for safety, RR Even and unlabored. Call bell within reach, bed locked in lowest position. IV site flushed w/ NS. No redness, swelling or pain noted to site. No signs of acute distress noted, safety maintained. Pt pending SW

## 2023-03-02 NOTE — ED ADULT NURSE REASSESSMENT NOTE - NS ED NURSE REASSESS COMMENT FT1
received pt in room sleeping 1:1 at bedside.  pt arousable to verbal stimuli.   cooperative with lab draw.  pt cachectic unkept, por hygiene.  dry blood by nares.  c/o abd /epigastric area pain. skin warm to touch vs as charted.  call placed to dr win made aware of c/o abd pain orders received . will monitor.  no harm to self or others

## 2023-03-02 NOTE — ED BEHAVIORAL HEALTH NOTE - BEHAVIORAL HEALTH NOTE
Pt escorted to  area due to suicidal statement and acute agitation, kicked an RN, required IM sedation, unable to be evaluated at this time due to sedation, will follow.

## 2023-03-02 NOTE — ED CDU PROVIDER INITIAL DAY NOTE - CLINICAL SUMMARY MEDICAL DECISION MAKING FREE TEXT BOX
Pt with crackcocaine abuse, now SI, likely coming down from stimulant use. Had ED w/u without medical concerns including multiple CTs with chronic findings nothing acute. placed in  for safety and further evaluation

## 2023-03-02 NOTE — ED ADULT NURSE REASSESSMENT NOTE - NS ED NURSE REASSESS COMMENT FT1
approx 1025 AM pt found on floor by unit clerk. unwitnessed fall, pt responsive to verbal stimuli. Pt denies head pain. No obvious signs of trauma or bleeding noted on assessment. MD Wang called to bedside. VS done, EKG done. Priority CT initiated, blood glucose performed. SPO2 96% on RA.

## 2023-03-02 NOTE — ED ADULT NURSE REASSESSMENT NOTE - NS ED NURSE REASSESS COMMENT FT1
pt found on floor. Pt neighbor states " it looked like he was going to throw up and then flipped over stretcher rail,". 3 RN brought pt back to stretcher, pt responsive to verbal commands and then began to aggressively kick RN. No obvious signs of trauma/bleeding/ +  noted pt found on floor. Pt neighbor states " it looked like he was going to throw up and then flipped over stretcher rail,". 3 RN brought pt back to stretcher, pt responsive to verbal commands and then began to aggressively kick RN. No obvious signs of trauma/bleeding noted. When pt was placed back in stretcher pt began to swing arms and legs continuously and kicked RN in chest. Pt placed back on CM, Security, MD Wang and Charge RN at bedside. NO s/s of respiratory distress noted. Pt placed on 1:1 for safety. Incidence report documented.

## 2023-03-03 VITALS
HEART RATE: 90 BPM | DIASTOLIC BLOOD PRESSURE: 76 MMHG | TEMPERATURE: 99 F | SYSTOLIC BLOOD PRESSURE: 341 MMHG | OXYGEN SATURATION: 97 % | RESPIRATION RATE: 19 BRPM

## 2023-03-03 DIAGNOSIS — F19.10 OTHER PSYCHOACTIVE SUBSTANCE ABUSE, UNCOMPLICATED: ICD-10-CM

## 2023-03-03 LAB
BASOPHILS # BLD AUTO: 0.03 K/UL — SIGNIFICANT CHANGE UP (ref 0–0.2)
BASOPHILS NFR BLD AUTO: 0.2 % — SIGNIFICANT CHANGE UP (ref 0–2)
EOSINOPHIL # BLD AUTO: 0 K/UL — SIGNIFICANT CHANGE UP (ref 0–0.5)
EOSINOPHIL NFR BLD AUTO: 0 % — SIGNIFICANT CHANGE UP (ref 0–6)
HCT VFR BLD CALC: 47 % — SIGNIFICANT CHANGE UP (ref 39–50)
HGB BLD-MCNC: 16 G/DL — SIGNIFICANT CHANGE UP (ref 13–17)
IMM GRANULOCYTES NFR BLD AUTO: 0.4 % — SIGNIFICANT CHANGE UP (ref 0–0.9)
LYMPHOCYTES # BLD AUTO: 1.65 K/UL — SIGNIFICANT CHANGE UP (ref 1–3.3)
LYMPHOCYTES # BLD AUTO: 10 % — LOW (ref 13–44)
MCHC RBC-ENTMCNC: 29.6 PG — SIGNIFICANT CHANGE UP (ref 27–34)
MCHC RBC-ENTMCNC: 34 GM/DL — SIGNIFICANT CHANGE UP (ref 32–36)
MCV RBC AUTO: 87 FL — SIGNIFICANT CHANGE UP (ref 80–100)
MONOCYTES # BLD AUTO: 0.72 K/UL — SIGNIFICANT CHANGE UP (ref 0–0.9)
MONOCYTES NFR BLD AUTO: 4.4 % — SIGNIFICANT CHANGE UP (ref 2–14)
NEUTROPHILS # BLD AUTO: 14.04 K/UL — HIGH (ref 1.8–7.4)
NEUTROPHILS NFR BLD AUTO: 85 % — HIGH (ref 43–77)
PLATELET # BLD AUTO: 418 K/UL — HIGH (ref 150–400)
RBC # BLD: 5.4 M/UL — SIGNIFICANT CHANGE UP (ref 4.2–5.8)
RBC # FLD: 12.9 % — SIGNIFICANT CHANGE UP (ref 10.3–14.5)
WBC # BLD: 16.5 K/UL — HIGH (ref 3.8–10.5)
WBC # FLD AUTO: 16.5 K/UL — HIGH (ref 3.8–10.5)

## 2023-03-03 PROCEDURE — 96375 TX/PRO/DX INJ NEW DRUG ADDON: CPT | Mod: XU

## 2023-03-03 PROCEDURE — 80307 DRUG TEST PRSMV CHEM ANLYZR: CPT

## 2023-03-03 PROCEDURE — 96365 THER/PROPH/DIAG IV INF INIT: CPT | Mod: XU

## 2023-03-03 PROCEDURE — U0003: CPT

## 2023-03-03 PROCEDURE — 82962 GLUCOSE BLOOD TEST: CPT

## 2023-03-03 PROCEDURE — 72125 CT NECK SPINE W/O DYE: CPT | Mod: MA

## 2023-03-03 PROCEDURE — 36415 COLL VENOUS BLD VENIPUNCTURE: CPT

## 2023-03-03 PROCEDURE — 81001 URINALYSIS AUTO W/SCOPE: CPT

## 2023-03-03 PROCEDURE — 96376 TX/PRO/DX INJ SAME DRUG ADON: CPT | Mod: XU

## 2023-03-03 PROCEDURE — G0378: CPT

## 2023-03-03 PROCEDURE — 71260 CT THORAX DX C+: CPT | Mod: MA

## 2023-03-03 PROCEDURE — 80053 COMPREHEN METABOLIC PANEL: CPT

## 2023-03-03 PROCEDURE — 99285 EMERGENCY DEPT VISIT HI MDM: CPT | Mod: 25

## 2023-03-03 PROCEDURE — 93005 ELECTROCARDIOGRAM TRACING: CPT

## 2023-03-03 PROCEDURE — 83690 ASSAY OF LIPASE: CPT

## 2023-03-03 PROCEDURE — 96372 THER/PROPH/DIAG INJ SC/IM: CPT | Mod: XU

## 2023-03-03 PROCEDURE — 71250 CT THORAX DX C-: CPT | Mod: MA

## 2023-03-03 PROCEDURE — 85025 COMPLETE CBC W/AUTO DIFF WBC: CPT

## 2023-03-03 PROCEDURE — 74177 CT ABD & PELVIS W/CONTRAST: CPT | Mod: MA

## 2023-03-03 PROCEDURE — 90792 PSYCH DIAG EVAL W/MED SRVCS: CPT

## 2023-03-03 PROCEDURE — 70450 CT HEAD/BRAIN W/O DYE: CPT | Mod: MA

## 2023-03-03 PROCEDURE — U0005: CPT

## 2023-03-03 PROCEDURE — 99238 HOSP IP/OBS DSCHRG MGMT 30/<: CPT

## 2023-03-03 RX ORDER — DIPHENHYDRAMINE HCL 50 MG
50 CAPSULE ORAL ONCE
Refills: 0 | Status: COMPLETED | OUTPATIENT
Start: 2023-03-03 | End: 2023-03-03

## 2023-03-03 RX ORDER — HALOPERIDOL DECANOATE 100 MG/ML
5 INJECTION INTRAMUSCULAR ONCE
Refills: 0 | Status: COMPLETED | OUTPATIENT
Start: 2023-03-03 | End: 2023-03-03

## 2023-03-03 RX ADMIN — HALOPERIDOL DECANOATE 5 MILLIGRAM(S): 100 INJECTION INTRAMUSCULAR at 05:23

## 2023-03-03 RX ADMIN — Medication 50 MILLIGRAM(S): at 05:22

## 2023-03-03 RX ADMIN — Medication 2 MILLIGRAM(S): at 05:21

## 2023-03-03 NOTE — ED ADULT NURSE REASSESSMENT NOTE - NS ED NURSE REASSESS COMMENT FT1
Patient on one to none for agitation/safety and violent behavior. Around 4:20 AM patient walked to another patient room and lay down on the other patient side, Patient refused to live the bed despite redirection. Nursing team on unit stayed with patient until patient walked back to his bed. Patient remain confused and combative still. Nursing made an attempt to make a set of vital signs for patient who complain of stomachache. After putting the blood pressure cuff in place, patient grabbed the nurse hand at her wrist and refused to let it go. Code gray activated, medical team and security team on unit assist to resolve the proble,. As per MD order, with assistance of security, patient was medicated with Ativan, Haldol and Benadryl, patient remain on one to one, nursing will continue to monitor and maintain patient safety..

## 2023-03-03 NOTE — ED CDU PROVIDER SUBSEQUENT DAY NOTE - HISTORY
Called to code grey.  Pt grabbed RN's arm and refused to let go.  On my arrival pt being physically restrained by staff, attempting to kick staff.  Multiple attempts made at verbal deescalation without results and pt requiring medication for his acute agitation

## 2023-03-03 NOTE — ED BEHAVIORAL HEALTH ASSESSMENT NOTE - DESCRIPTION
Denies, seen medically for possible enteritis Undomiciled, works seasonally in landscaping ICU Vital Signs Last 24 Hrs  T(C): 37.2 (03 Mar 2023 08:20), Max: 37.2 (03 Mar 2023 08:20)  T(F): 98.9 (03 Mar 2023 08:20), Max: 98.9 (03 Mar 2023 08:20)  HR: 86 (03 Mar 2023 08:20) (86 - 120)  BP: 121/85 (03 Mar 2023 08:20) (120/82 - 121/85)  BP(mean): --  ABP: --  ABP(mean): --  RR: 18 (03 Mar 2023 08:20) (18 - 18)  SpO2: 95% (03 Mar 2023 08:20) (95% - 95%)    O2 Parameters below as of 03 Mar 2023 08:20  Patient On (Oxygen Delivery Method): room air

## 2023-03-03 NOTE — ED CDU PROVIDER DISPOSITION NOTE - CLINICAL COURSE
labs and diagnostic imaging results reviewed with patient; psychiatry assessment noted; patient feels comfortable with discharge and medical plan; PMD or clinic follow up recommended for reassessment. Patient is aware of signs/symptoms to return to the emergency department.

## 2023-03-03 NOTE — ED BEHAVIORAL HEALTH ASSESSMENT NOTE - OTHER
Short attention span Guarded Perseverative on discharge Works seasonally [facility] [employer]

## 2023-03-03 NOTE — ED BEHAVIORAL HEALTH ASSESSMENT NOTE - HPI (INCLUDE ILLNESS QUALITY, SEVERITY, DURATION, TIMING, CONTEXT, MODIFYING FACTORS, ASSOCIATED SIGNS AND SYMPTOMS)
Patient is a 27 year old, undomiciled,  male, currently unemployed, working seasonally in [employer] with no formal PPHx/PMHx. He presented to the ED for an initially unknown complaint, then began to display agitated and aggressive behavior. He reported to writer that this was in the context of pain and it appears he becomes increasingly agitated when touched. Per ED documentation, he made an SI statement and was referred to  for consult. Writer received pt on 1:1 observation. On evaluation, pt presents guarded, disheveled and minimally engaging with poor eye contact. Repeatedly asking for belongings and to go home, reports feeling better. He denies SI/HI, AVH and urges for NSSIB. Reports mood as "good." No anxiety, murali/hypomania or psychotic symptoms elicited. No prior IPU or outpatient psychiatric treatment reported. No prior SA. Reports appetite as good and sleep as poor, getting 3-4 hours per night. Denies family PPHx. Reports use of "a little" crack "when he has money" and states he smokes 1 cigarette per day. Denies use of ETOH and other illicits, despite Utox positive for multiple substances. Denies safety concerns when discharged and stated he does not have "anyone" writer can contact for collateral.

## 2023-03-03 NOTE — ED CDU PROVIDER SUBSEQUENT DAY NOTE - CLINICAL SUMMARY MEDICAL DECISION MAKING FREE TEXT BOX
Pt with crack cocaine abuse, now SI, likely coming down from stimulant use. Had ED w/u without medical concerns including multiple CTs with chronic findings nothing acute. placed in  for safety and further evaluation

## 2023-03-03 NOTE — CHART NOTE - NSCHARTNOTEFT_GEN_A_CORE
SW Note: Notified by  provider that the pt has been cleared for discharge. Met with pt, offered  and pt declined stating he speaks english. The pt declined all  services. Offered SBIRT screening and pt declined. Reports he is staying with a friend. Pt refused consent to contact his friend regarding ED visit and D/C planning. Pt reports no family involvement. Provided pt with Pending sale to [facility] and mobile crisis line and made him aware they can also help him with substance abuse

## 2023-03-03 NOTE — ED BEHAVIORAL HEALTH ASSESSMENT NOTE - SUMMARY
Patient is a 27 year old, undomiciled,  male, currently unemployed, working seasonally in [employer] with no formal PPHx/PMHx. He presented to the ED for an initially unknown complaint, then began to display agitated and aggressive behavior. He reported to writer that this was in the context of pain and it appears he becomes increasingly agitated when touched. Per ED documentation, he made an SI statement and was referred to  for consult. He denies SI/HI, AVH and urges for NSSIB. Reports mood as "good." No anxiety, murali/hypomania or psychotic symptoms elicited. No prior IPU or outpatient psychiatric treatment reported. No prior SA. Reports appetite as good and sleep as poor, getting 3-4 hours per night. Denies family PPHx. Reports use of "a little" crack "when he has money" and states he smokes 1 cigarette per day. Denies use of ETOH and other illicits, despite Utox positive for multiple substances. Denies safety concerns when discharged and stated he does not have "anyone" writer can contact for collateral.    Patient does not present with any notable psychiatric symptoms, therefore does not meet criteria for inpatient hospitalization. Pt to be discharged with community resources. Patient is a 27 year old, undomiciled,  male, currently unemployed, working seasonally in [employer] with no formal PPHx/PMHx. He presented to the ED for an initially unknown complaint, then began to display agitated and aggressive behavior. He reported to writer that this was in the context of pain and it appears he becomes increasingly agitated when touched. Per ED documentation, he made an SI statement and was referred to  for consult. He denies SI/HI, AVH and urges for NSSIB. Reports mood as "good." No anxiety, murali/hypomania or psychotic symptoms elicited. No prior IPU or outpatient psychiatric treatment reported. No prior SA. Reports appetite as good and sleep as poor, getting 3-4 hours per night. Denies family PPHx. Reports use of "a little" crack "when he has money" and states he smokes 1 cigarette per day. Denies use of ETOH and other illicits, despite Utox positive for multiple substances. Denies safety concerns when discharged and stated he does not have "anyone" writer can contact for collateral.    Patient does not present with any notable psychiatric symptoms, is requesting discharge and does not meet criteria for inpatient hospitalization. Pt to be discharged with community resources.

## 2023-03-03 NOTE — ED BEHAVIORAL HEALTH ASSESSMENT NOTE - NSBHATTESTAPPBILLTIME_PSY_A_CORE
I attest my time as HEBER is greater than 50% of the total combined time spent on qualifying patient care activities. I have reviewed and verified the documentation.

## 2023-03-03 NOTE — ED CDU PROVIDER DISPOSITION NOTE - PATIENT PORTAL LINK FT
You can access the FollowMyHealth Patient Portal offered by [facility] by registering at the following website: [email or web address]. By joining [employer]’s FollowMyHealth portal, you will also be able to view your health information using other applications (apps) compatible with our system.

## 2023-03-06 ENCOUNTER — EMERGENCY (EMERGENCY)
Facility: HOSPITAL | Age: 28
LOS: 1 days | Discharge: HOME | End: 2023-03-06
Attending: STUDENT IN AN ORGANIZED HEALTH CARE EDUCATION/TRAINING PROGRAM
Payer: SELF-PAY

## 2023-03-06 VITALS
SYSTOLIC BLOOD PRESSURE: 124 MMHG | HEART RATE: 100 BPM | RESPIRATION RATE: 18 BRPM | OXYGEN SATURATION: 99 % | DIASTOLIC BLOOD PRESSURE: 74 MMHG | TEMPERATURE: 98 F

## 2023-03-06 VITALS
SYSTOLIC BLOOD PRESSURE: 110 MMHG | HEART RATE: 97 BPM | RESPIRATION RATE: 20 BRPM | OXYGEN SATURATION: 96 % | DIASTOLIC BLOOD PRESSURE: 72 MMHG | TEMPERATURE: 99 F

## 2023-03-06 DIAGNOSIS — S52.209A UNSPECIFIED FRACTURE OF SHAFT OF UNSPECIFIED ULNA, INITIAL ENCOUNTER FOR CLOSED FRACTURE: Chronic | ICD-10-CM

## 2023-03-06 DIAGNOSIS — Z98.890 OTHER SPECIFIED POSTPROCEDURAL STATES: Chronic | ICD-10-CM

## 2023-03-06 LAB
ALBUMIN SERPL ELPH-MCNC: 4 G/DL — SIGNIFICANT CHANGE UP (ref 3.3–5.2)
ALP SERPL-CCNC: 137 U/L — HIGH (ref 40–120)
ALT FLD-CCNC: 7 U/L — SIGNIFICANT CHANGE UP
ANION GAP SERPL CALC-SCNC: 14 MMOL/L — SIGNIFICANT CHANGE UP (ref 5–17)
AST SERPL-CCNC: 20 U/L — SIGNIFICANT CHANGE UP
BASOPHILS # BLD AUTO: 0.04 K/UL — SIGNIFICANT CHANGE UP (ref 0–0.2)
BASOPHILS NFR BLD AUTO: 0.2 % — SIGNIFICANT CHANGE UP (ref 0–2)
BILIRUB SERPL-MCNC: 0.4 MG/DL — SIGNIFICANT CHANGE UP (ref 0.4–2)
BUN SERPL-MCNC: 9.9 MG/DL — SIGNIFICANT CHANGE UP (ref 8–20)
CALCIUM SERPL-MCNC: 8.9 MG/DL — SIGNIFICANT CHANGE UP (ref 8.4–10.5)
CHLORIDE SERPL-SCNC: 100 MMOL/L — SIGNIFICANT CHANGE UP (ref 96–108)
CO2 SERPL-SCNC: 25 MMOL/L — SIGNIFICANT CHANGE UP (ref 22–29)
CREAT SERPL-MCNC: 0.61 MG/DL — SIGNIFICANT CHANGE UP (ref 0.5–1.3)
EGFR: 128 ML/MIN/1.73M2 — SIGNIFICANT CHANGE UP
EOSINOPHIL # BLD AUTO: 0.09 K/UL — SIGNIFICANT CHANGE UP (ref 0–0.5)
EOSINOPHIL NFR BLD AUTO: 0.5 % — SIGNIFICANT CHANGE UP (ref 0–6)
GLUCOSE SERPL-MCNC: 92 MG/DL — SIGNIFICANT CHANGE UP (ref 70–99)
HCT VFR BLD CALC: 41.2 % — SIGNIFICANT CHANGE UP (ref 39–50)
HGB BLD-MCNC: 14.4 G/DL — SIGNIFICANT CHANGE UP (ref 13–17)
IMM GRANULOCYTES NFR BLD AUTO: 0.6 % — SIGNIFICANT CHANGE UP (ref 0–0.9)
LIDOCAIN IGE QN: 31 U/L — SIGNIFICANT CHANGE UP (ref 22–51)
LYMPHOCYTES # BLD AUTO: 1.94 K/UL — SIGNIFICANT CHANGE UP (ref 1–3.3)
LYMPHOCYTES # BLD AUTO: 9.9 % — LOW (ref 13–44)
MCHC RBC-ENTMCNC: 30.6 PG — SIGNIFICANT CHANGE UP (ref 27–34)
MCHC RBC-ENTMCNC: 35 GM/DL — SIGNIFICANT CHANGE UP (ref 32–36)
MCV RBC AUTO: 87.7 FL — SIGNIFICANT CHANGE UP (ref 80–100)
MONOCYTES # BLD AUTO: 1.11 K/UL — HIGH (ref 0–0.9)
MONOCYTES NFR BLD AUTO: 5.7 % — SIGNIFICANT CHANGE UP (ref 2–14)
NEUTROPHILS # BLD AUTO: 16.26 K/UL — HIGH (ref 1.8–7.4)
NEUTROPHILS NFR BLD AUTO: 83.1 % — HIGH (ref 43–77)
PLATELET # BLD AUTO: 324 K/UL — SIGNIFICANT CHANGE UP (ref 150–400)
POTASSIUM SERPL-MCNC: 3.8 MMOL/L — SIGNIFICANT CHANGE UP (ref 3.5–5.3)
POTASSIUM SERPL-SCNC: 3.8 MMOL/L — SIGNIFICANT CHANGE UP (ref 3.5–5.3)
PROT SERPL-MCNC: 6.9 G/DL — SIGNIFICANT CHANGE UP (ref 6.6–8.7)
RBC # BLD: 4.7 M/UL — SIGNIFICANT CHANGE UP (ref 4.2–5.8)
RBC # FLD: 12.4 % — SIGNIFICANT CHANGE UP (ref 10.3–14.5)
SODIUM SERPL-SCNC: 139 MMOL/L — SIGNIFICANT CHANGE UP (ref 135–145)
WBC # BLD: 19.55 K/UL — HIGH (ref 3.8–10.5)
WBC # FLD AUTO: 19.55 K/UL — HIGH (ref 3.8–10.5)

## 2023-03-06 PROCEDURE — 74177 CT ABD & PELVIS W/CONTRAST: CPT | Mod: 26,MA

## 2023-03-06 PROCEDURE — 99285 EMERGENCY DEPT VISIT HI MDM: CPT

## 2023-03-06 PROCEDURE — 99053 MED SERV 10PM-8AM 24 HR FAC: CPT

## 2023-03-06 PROCEDURE — 93010 ELECTROCARDIOGRAM REPORT: CPT

## 2023-03-06 RX ORDER — METOCLOPRAMIDE HCL 10 MG
10 TABLET ORAL ONCE
Refills: 0 | Status: COMPLETED | OUTPATIENT
Start: 2023-03-06 | End: 2023-03-06

## 2023-03-06 RX ORDER — HALOPERIDOL DECANOATE 100 MG/ML
2.5 INJECTION INTRAMUSCULAR ONCE
Refills: 0 | Status: COMPLETED | OUTPATIENT
Start: 2023-03-06 | End: 2023-03-06

## 2023-03-06 RX ORDER — KETOROLAC TROMETHAMINE 30 MG/ML
15 SYRINGE (ML) INJECTION ONCE
Refills: 0 | Status: DISCONTINUED | OUTPATIENT
Start: 2023-03-06 | End: 2023-03-06

## 2023-03-06 RX ORDER — FAMOTIDINE 10 MG/ML
20 INJECTION INTRAVENOUS ONCE
Refills: 0 | Status: COMPLETED | OUTPATIENT
Start: 2023-03-06 | End: 2023-03-06

## 2023-03-06 RX ORDER — SODIUM CHLORIDE 9 MG/ML
1000 INJECTION INTRAMUSCULAR; INTRAVENOUS; SUBCUTANEOUS ONCE
Refills: 0 | Status: COMPLETED | OUTPATIENT
Start: 2023-03-06 | End: 2023-03-06

## 2023-03-06 RX ORDER — ONDANSETRON 8 MG/1
4 TABLET, FILM COATED ORAL ONCE
Refills: 0 | Status: COMPLETED | OUTPATIENT
Start: 2023-03-06 | End: 2023-03-06

## 2023-03-06 RX ADMIN — SODIUM CHLORIDE 1000 MILLILITER(S): 9 INJECTION INTRAMUSCULAR; INTRAVENOUS; SUBCUTANEOUS at 12:19

## 2023-03-06 RX ADMIN — SODIUM CHLORIDE 1000 MILLILITER(S): 9 INJECTION INTRAMUSCULAR; INTRAVENOUS; SUBCUTANEOUS at 08:08

## 2023-03-06 RX ADMIN — SODIUM CHLORIDE 1000 MILLILITER(S): 9 INJECTION INTRAMUSCULAR; INTRAVENOUS; SUBCUTANEOUS at 06:37

## 2023-03-06 RX ADMIN — FAMOTIDINE 20 MILLIGRAM(S): 10 INJECTION INTRAVENOUS at 06:35

## 2023-03-06 RX ADMIN — Medication 30 MILLILITER(S): at 08:08

## 2023-03-06 RX ADMIN — HALOPERIDOL DECANOATE 2.5 MILLIGRAM(S): 100 INJECTION INTRAMUSCULAR at 05:44

## 2023-03-06 RX ADMIN — Medication 10 MILLIGRAM(S): at 12:19

## 2023-03-06 RX ADMIN — ONDANSETRON 4 MILLIGRAM(S): 8 TABLET, FILM COATED ORAL at 10:28

## 2023-03-06 RX ADMIN — Medication 15 MILLIGRAM(S): at 06:31

## 2023-03-06 RX ADMIN — HALOPERIDOL DECANOATE 2.5 MILLIGRAM(S): 100 INJECTION INTRAMUSCULAR at 05:55

## 2023-03-06 RX ADMIN — Medication 2 MILLIGRAM(S): at 05:56

## 2023-03-06 NOTE — ED ADULT TRIAGE NOTE - CHIEF COMPLAINT QUOTE
Pt walked into ED and said "I need a doctor" and syncopized  in the ED Pt walked into ED and said "I need a doctor" and syncopized on the floor. Pt sent to critical

## 2023-03-06 NOTE — ED PROVIDER NOTE - PHYSICAL EXAMINATION
Constitutional: Awake, alert, mildly agitated and kicking his legs  Eyes: no scleral icterus  HENT: normocephalic, atraumatic, moist oral mucosa  Neck: supple  CV: RRR, no murmur  Pulm: non-labored respirations, CTAB  Abdomen: soft, non-tender, non-distended  Extremities: no edema, no deformity  Skin: no rash, no jaundice  Neuro: AAOx3, moving all extremities equally

## 2023-03-06 NOTE — ED PROVIDER NOTE - PROGRESS NOTE DETAILS
Aldo: Pt calmer, still complaining of abdominal discomfort. Abdomen remains soft and non-tender. Signed out to Dr. Henson pending further reassessment. JK - CT WNL. Belly soft nontender. Tolerating PO, ambulating on his own. Clinically sober. Ready for DC with return precautions.

## 2023-03-06 NOTE — ED PROVIDER NOTE - CLINICAL SUMMARY MEDICAL DECISION MAKING FREE TEXT BOX
27y M w/ hx polysubstance abuse, gastritis, reflux esophagitis, pancreatitis, hx of cholecystectomy; presents for abdominal pain. Pt known to me from prior ED visit with similar presentation. Per review of prior records under alternate MRN, pt has been evaluated multiple times in this hospital for similar complaints, including admission with GI workup. Suspect component of polysubstance abuse / cyclic vomiting syndrome. Treated with haldol and ativan for agitation / pain. EKG and labs without acute findings.

## 2023-03-06 NOTE — ED ADULT NURSE NOTE - NSIMPLEMENTINTERV_GEN_ALL_ED
Implemented All Fall Risk Interventions:  [place] to call system. Call bell, personal items and telephone within reach. Instruct patient to call for assistance. Room bathroom lighting operational. Non-slip footwear when patient is off stretcher. Physically safe environment: no spills, clutter or unnecessary equipment. Stretcher in lowest position, wheels locked, appropriate side rails in place. Provide visual cue, wrist band, yellow gown, etc. Monitor gait and stability. Monitor for mental status changes and reorient to person, place, and time. Review medications for side effects contributing to fall risk. Reinforce activity limits and safety measures with patient and family. Implemented All Fall Risk Interventions:  [place] to call system. Call bell, personal items and telephone within reach. Instruct patient to call for assistance. Room bathroom lighting operational. Non-slip footwear when patient is off stretcher. Physically safe environment: no spills, clutter or unnecessary equipment. Stretcher in lowest position, wheels locked, appropriate side rails in place. Provide visual cue, wrist band, yellow gown, etc. Monitor gait and stability. Monitor for mental status changes and reorient to person, place, and time. Review medications for side effects contributing to fall risk. Reinforce activity limits and safety measures with patient and family. Implemented All Fall Risk Interventions:  [place] to call system. Call bell, personal items and telephone within reach. Instruct patient to call for assistance. Room bathroom lighting operational. Non-slip footwear when patient is off stretcher. Physically safe environment: no spills, clutter or unnecessary equipment. Stretcher in lowest position, wheels locked, appropriate side rails in place. Provide visual cue, wrist band, yellow gown, etc. Monitor gait and stability. Monitor for mental status changes and reorient to person, place, and time. Review medications for side effects contributing to fall risk. Reinforce activity limits and safety measures with patient and family.

## 2023-03-06 NOTE — ED PROVIDER NOTE - OBJECTIVE STATEMENT
27y M (alternate MRN [id number] 27y M (alternate MRN [id number] 27y M (alternate MRN [id number] 27y M w/ hx polysubstance abuse, gastritis, reflux esophagitis, pancreatitis, hx of cholecystectomy; presents for abdominal pain. Pt had apparent syncopal episode in the waiting room; said he "needed to see a doctor" and then collapsed to the floor. Pt brought to critical care area; subsequently awoke, kicking his legs and demanding pain medication for abdominal pain. Pt has previously been evaluated in this hospital multiple times for similar complaints. Was just seen in this ED on 3/1 (under alternate MRN [id number]); was seen by psych for agitation and cleared. Has also been registered under [id number]. 27y M w/ hx polysubstance abuse, gastritis, reflux esophagitis, pancreatitis, hx of cholecystectomy; presents for abdominal pain. Pt had apparent syncopal episode in the waiting room; said he "needed to see a doctor" and then collapsed to the floor. Pt brought to critical care area; subsequently awoke, kicking his legs and demanding pain medication for abdominal pain. Pt has previously been evaluated in this hospital multiple times for similar complaints. Was just seen in this ED on 3/1 (under alternate MRN [id number]); was seen by psych for agitation and cleared. Has also been registered under [id number]. 27y M w/ hx polysubstance abuse, gastritis, reflux esophagitis, pancreatitis, hx of cholecystectomy; presents for abdominal pain. Pt had apparent syncopal episode in the waiting room; said he "needed to see a doctor" and then collapsed to the floor. Pt brought to critical care area; subsequently awoke, kicking his legs and demanding pain medication for abdominal pain. Pt has previously been evaluated in this hospital multiple times for similar complaints. Was just seen in this ED on 3/1 (under alternate MRN [id number]); was seen by psych for agitation and cleared. Has also been registered under [id number].

## 2023-03-06 NOTE — ED PROVIDER NOTE - CARE PROVIDERS DIRECT ADDRESSES
[email or web address]

## 2023-03-06 NOTE — ED PROVIDER NOTE - PATIENT PORTAL LINK FT
You can access the FollowMyHealth Patient Portal offered by [facility] by registering at the following website: [email or web address]. By joining [employer]’s FollowMyHealth portal, you will also be able to view your health information using other applications (apps) compatible with our system. You can access the FollowMyHealth Patient Portal offered by [facility] by registering at the following website: [email or web address]. By joining [employer]’s FollowMyHealth portal, you will also be able to view your health information using other applications (apps) compatible with our system. You can access the FollowMyHealth Patient Portal offered by [facility] by registering at the following website: [email or web address]. By joining [employer]’s FollowMyHealth portal, you will also be able to view your health information using other applications (apps) compatible with our system.

## 2023-03-06 NOTE — ED PROVIDER NOTE - CARE PROVIDER_API CALL
Carley Quevedo (DO)  Gastroenterology  [address], [address]  [place] [address]  Phone: [phone]  Fax: [phone]  Follow Up Time:    Carley Quevedo (DO)  Gastroenterology  [address], [address]  [place] [address]  Phone: [phone]  Fax: [phone]  Follow Up Time:    Carley Quevedo (DO)  Gastroenterology  [address], [address]  [place] [address]  Phone: [phone]  Fax: [phone]  Follow Up Time:

## 2023-03-06 NOTE — ED PROVIDER NOTE - NS ED ROS FT
Constitutional: no fever, no chills  Eyes: no vision changes  ENT: no nasal congestion, no sore throat  CV: no chest pain  Resp: no cough, no shortness of breath  GI: +abdominal pain, no vomiting, no diarrhea  : no dysuria  MSK: no joint pain  Skin: no rash  Neuro: no headache, no focal weakness, no paresthesias

## 2023-03-06 NOTE — ED ADULT NURSE REASSESSMENT NOTE - NS ED NURSE REASSESS COMMENT FT1
Assumed care or pt from previous RN at this time. PT is awake and alert. RR even and unlabored. skin is warm and dry. Pt resting on stretcher.

## 2023-03-06 NOTE — ED PROVIDER NOTE - ATTENDING CONTRIBUTION TO CARE
NATALIA Henson: see mdm    I have personally performed a face to face diagnostic evaluation on this patient.  I have reviewed the resident's note and agree with the history, exam, and plan of care, except as noted.   My medical decision making and observations are found above.

## 2023-03-06 NOTE — ED ADULT NURSE NOTE - PRO INTERPRETER NEED 2
Verbal/written post procedure instructions were given to patient/caregiver./Keep the cast/splint/dressing clean and dry./Instructed patient/caregiver to follow-up with primary care physician./Instructed patient/caregiver regarding signs and symptoms of infection./Elevate the injured extremity as instructed. English

## 2023-03-06 NOTE — ED ADULT NURSE NOTE - OBJECTIVE STATEMENT
RNs called to walk in triage d/t pt having syncopal episode and falling on ground. pt placed on stretcher and brought to critical care area of ED. Dr Carlson at bedside. pt uncooperative with assessment, just stating "I need something for pain in my stomach". pt became aggressive, spitting and attempting to hit and kick staff members. security brought to bedside. pt not responsive to verbal de-escalations attempts and pt was medicated for aggression. respirations even and unlabored. pt placed on telemetry and  monitoring, NSR on telemetry, oxygen saturation >95% on room air. pt placed in yellow gown

## 2023-03-09 ENCOUNTER — EMERGENCY (EMERGENCY)
Facility: HOSPITAL | Age: 28
LOS: 1 days | Discharge: HOME | End: 2023-03-09
Attending: STUDENT IN AN ORGANIZED HEALTH CARE EDUCATION/TRAINING PROGRAM
Payer: SELF-PAY

## 2023-03-09 VITALS
SYSTOLIC BLOOD PRESSURE: 101 MMHG | TEMPERATURE: 98 F | RESPIRATION RATE: 18 BRPM | HEART RATE: 82 BPM | WEIGHT: 139.99 LBS | OXYGEN SATURATION: 98 % | DIASTOLIC BLOOD PRESSURE: 67 MMHG

## 2023-03-09 DIAGNOSIS — Z90.49 ACQUIRED ABSENCE OF OTHER SPECIFIED PARTS OF DIGESTIVE TRACT: Chronic | ICD-10-CM

## 2023-03-09 PROCEDURE — 99284 EMERGENCY DEPT VISIT MOD MDM: CPT

## 2023-03-09 PROCEDURE — 99053 MED SERV 10PM-8AM 24 HR FAC: CPT

## 2023-03-09 RX ORDER — IBUPROFEN 200 MG
600 TABLET ORAL ONCE
Refills: 0 | Status: COMPLETED | OUTPATIENT
Start: 2023-03-09 | End: 2023-03-09

## 2023-03-09 RX ADMIN — Medication 600 MILLIGRAM(S): at 04:24

## 2023-03-09 NOTE — ED PROVIDER NOTE - OBJECTIVE STATEMENT
28yo M w/pmh polysubstance abuse, gastritis, reflux esophagitis, pancreatitis, hx of cholecystectomy presents for diffuse body aches x2d. Reports nausea/vomiting x1 episode earlier today, now resolved. Reports he took tylenol earlier for pain. Denies CP, SOB, abdominal pain. Last crack cocaine use was yesterday. Recently seen in this ER 3d ago, workup including CT abdomen/pelvis was negative. 26yo M w/pmh polysubstance abuse, gastritis, reflux esophagitis, pancreatitis, hx of cholecystectomy presents for diffuse body aches x2d. Reports nausea/vomiting x1 episode earlier today, now resolved. Reports he took tylenol earlier for pain. Denies CP, SOB, abdominal pain. Last crack cocaine use was yesterday. Recently seen in this ER 3d ago, workup including CT abdomen/pelvis was negative.

## 2023-03-09 NOTE — ED PROVIDER NOTE - NS ED ROS FT
Constitutional: no fever, no sweats, and no chills.  CV: no chest pain, no edema.  Resp:no congestion, no dyspnea  GI: no abdominal pain, +nausea and +vomiting.  MSK: +msk pain, no weakness  Skin: no lesions, and no rashes.  Neuro: no headache, no dizziness  ROS otherwise negative except as noted in HPI.

## 2023-03-09 NOTE — ED PROVIDER NOTE - PHYSICAL EXAMINATION
General: well appearing, NAD, poor hygiene, wearing yellow gown and paper scrubs from previous visit under coat  Head: NC/AT, poor dentition  Cardiac: RRR, no m/r/g  Respiratory: CTABL, equal chest wall expansions, no conversational dyspnea  Abdomen: soft, ND, NT  Neuro: AAOx3, coordinated movement of all 4 extremities  Psych: calm, cooperative, normal affect  Skin: warm and dry

## 2023-03-09 NOTE — ED PROVIDER NOTE - NSFOLLOWUPINSTRUCTIONS_ED_ALL_ED_FT
- Take tylenol and ibuprofen for pain. Add zofran as needed for nausea.  - Follow up with your doctor within 2-3 days.   - Return to the ED for any new or worsening symptoms.

## 2023-03-09 NOTE — ED PROVIDER NOTE - ATTENDING CONTRIBUTION TO CARE
Pt with 1 episode of vomiting tonight and co body aches.  Pt now eating many snacks in the department. no other complaints.    physical - rrr. ctab. abd - soft, nt. no edema. no rash.    plan - pt reassured. given motrin. will d/c with outpt f/up and return instructions.

## 2023-03-09 NOTE — ED ADULT TRIAGE NOTE - CHIEF COMPLAINT QUOTE
pt c/o vomiting and body pain, pt admits to smoke marijuana denies alcohol drinking, pt refused to answer any more questions in triage

## 2023-03-09 NOTE — ED PROVIDER NOTE - CLINICAL SUMMARY MEDICAL DECISION MAKING FREE TEXT BOX
28yo M w/pmh polysubstance abuse, gastritis, reflux esophagitis, pancreatitis, hx of cholecystectomy presents for diffuse body aches x2d. Left bed immediately to find snack on arrival to ER. Recent workup 3d ago negative. VSS, benign exam. Ambulatory, tolerating PO. Ibuprofen for pain.  Okay for discharge with outpatient follow-up and strict return precautions. 26yo M w/pmh polysubstance abuse, gastritis, reflux esophagitis, pancreatitis, hx of cholecystectomy presents for diffuse body aches x2d. Left bed immediately to find snack on arrival to ER. Recent workup 3d ago negative. VSS, benign exam. Ambulatory, tolerating PO. Ibuprofen for pain.  Okay for discharge with outpatient follow-up and strict return precautions.

## 2023-03-09 NOTE — ED ADULT TRIAGE NOTE - NSWEIGHTCALCTOOLDRUG_GEN_A_CORE
9/24/21 @ 200 Notified Dr. Aline Toure of Pulmonology consult via [address] [place] [address]  used

## 2023-03-09 NOTE — ED PROVIDER NOTE - PATIENT PORTAL LINK FT
You can access the FollowMyHealth Patient Portal offered by [facility] by registering at the following website: [email or web address]. By joining [employer]’s FollowMyHealth portal, you will also be able to view your health information using other applications (apps) compatible with our system. You can access the FollowMyHealth Patient Portal offered by [facility] by registering at the following website: [email or web address]. By joining [employer]’s FollowMyHealth portal, you will also be able to view your health information using other applications (apps) compatible with our system. You can access the FollowMyHealth Patient Portal offered by [facility] by registering at the following website: [email or web address]. By joining [employer]’s FollowMyHealth portal, you will also be able to view your health information using other applications (apps) compatible with our system.

## 2023-04-03 ENCOUNTER — EMERGENCY (EMERGENCY)
Facility: HOSPITAL | Age: 28
LOS: 1 days | Discharge: HOME | End: 2023-04-03
Attending: EMERGENCY MEDICINE
Payer: MEDICAID

## 2023-04-03 VITALS — TEMPERATURE: 100 F

## 2023-04-03 VITALS
HEART RATE: 104 BPM | OXYGEN SATURATION: 95 % | RESPIRATION RATE: 16 BRPM | DIASTOLIC BLOOD PRESSURE: 103 MMHG | SYSTOLIC BLOOD PRESSURE: 136 MMHG | TEMPERATURE: 98 F

## 2023-04-03 LAB
ALBUMIN SERPL ELPH-MCNC: 4.3 G/DL — SIGNIFICANT CHANGE UP (ref 3.3–5.2)
ALP SERPL-CCNC: 143 U/L — HIGH (ref 40–120)
ALT FLD-CCNC: 5 U/L — SIGNIFICANT CHANGE UP
ANION GAP SERPL CALC-SCNC: 10 MMOL/L — SIGNIFICANT CHANGE UP (ref 5–17)
AST SERPL-CCNC: 15 U/L — SIGNIFICANT CHANGE UP
BASOPHILS # BLD AUTO: 0.05 K/UL — SIGNIFICANT CHANGE UP (ref 0–0.2)
BASOPHILS NFR BLD AUTO: 0.3 % — SIGNIFICANT CHANGE UP (ref 0–2)
BILIRUB SERPL-MCNC: <0.2 MG/DL — LOW (ref 0.4–2)
BUN SERPL-MCNC: 8.1 MG/DL — SIGNIFICANT CHANGE UP (ref 8–20)
CALCIUM SERPL-MCNC: 9.1 MG/DL — SIGNIFICANT CHANGE UP (ref 8.4–10.5)
CHLORIDE SERPL-SCNC: 100 MMOL/L — SIGNIFICANT CHANGE UP (ref 96–108)
CO2 SERPL-SCNC: 28 MMOL/L — SIGNIFICANT CHANGE UP (ref 22–29)
CREAT SERPL-MCNC: 0.66 MG/DL — SIGNIFICANT CHANGE UP (ref 0.5–1.3)
EGFR: 128 ML/MIN/1.73M2 — SIGNIFICANT CHANGE UP
EOSINOPHIL # BLD AUTO: 0.08 K/UL — SIGNIFICANT CHANGE UP (ref 0–0.5)
EOSINOPHIL NFR BLD AUTO: 0.5 % — SIGNIFICANT CHANGE UP (ref 0–6)
ETHANOL SERPL-MCNC: <10 MG/DL — SIGNIFICANT CHANGE UP (ref 0–9)
GLUCOSE SERPL-MCNC: 108 MG/DL — HIGH (ref 70–99)
HCT VFR BLD CALC: 45.8 % — SIGNIFICANT CHANGE UP (ref 39–50)
HGB BLD-MCNC: 15.2 G/DL — SIGNIFICANT CHANGE UP (ref 13–17)
IMM GRANULOCYTES NFR BLD AUTO: 0.5 % — SIGNIFICANT CHANGE UP (ref 0–0.9)
LYMPHOCYTES # BLD AUTO: 14.8 % — SIGNIFICANT CHANGE UP (ref 13–44)
LYMPHOCYTES # BLD AUTO: 2.25 K/UL — SIGNIFICANT CHANGE UP (ref 1–3.3)
MCHC RBC-ENTMCNC: 29.9 PG — SIGNIFICANT CHANGE UP (ref 27–34)
MCHC RBC-ENTMCNC: 33.2 GM/DL — SIGNIFICANT CHANGE UP (ref 32–36)
MCV RBC AUTO: 90 FL — SIGNIFICANT CHANGE UP (ref 80–100)
MONOCYTES # BLD AUTO: 0.57 K/UL — SIGNIFICANT CHANGE UP (ref 0–0.9)
MONOCYTES NFR BLD AUTO: 3.8 % — SIGNIFICANT CHANGE UP (ref 2–14)
NEUTROPHILS # BLD AUTO: 12.16 K/UL — HIGH (ref 1.8–7.4)
NEUTROPHILS NFR BLD AUTO: 80.1 % — HIGH (ref 43–77)
PCP SPEC-MCNC: SIGNIFICANT CHANGE UP
PLATELET # BLD AUTO: 362 K/UL — SIGNIFICANT CHANGE UP (ref 150–400)
POTASSIUM SERPL-MCNC: 4.3 MMOL/L — SIGNIFICANT CHANGE UP (ref 3.5–5.3)
POTASSIUM SERPL-SCNC: 4.3 MMOL/L — SIGNIFICANT CHANGE UP (ref 3.5–5.3)
PROT SERPL-MCNC: 7.1 G/DL — SIGNIFICANT CHANGE UP (ref 6.6–8.7)
RAPID RVP RESULT: SIGNIFICANT CHANGE UP
RBC # BLD: 5.09 M/UL — SIGNIFICANT CHANGE UP (ref 4.2–5.8)
RBC # FLD: 13.4 % — SIGNIFICANT CHANGE UP (ref 10.3–14.5)
SARS-COV-2 RNA SPEC QL NAA+PROBE: SIGNIFICANT CHANGE UP
SODIUM SERPL-SCNC: 138 MMOL/L — SIGNIFICANT CHANGE UP (ref 135–145)
WBC # BLD: 15.18 K/UL — HIGH (ref 3.8–10.5)
WBC # FLD AUTO: 15.18 K/UL — HIGH (ref 3.8–10.5)

## 2023-04-03 RX ORDER — MIDAZOLAM HYDROCHLORIDE 1 MG/ML
5 INJECTION, SOLUTION INTRAMUSCULAR; INTRAVENOUS ONCE
Refills: 0 | Status: DISCONTINUED | OUTPATIENT
Start: 2023-04-03 | End: 2023-04-03

## 2023-04-03 RX ORDER — SODIUM CHLORIDE 9 MG/ML
1000 INJECTION, SOLUTION INTRAVENOUS ONCE
Refills: 0 | Status: COMPLETED | OUTPATIENT
Start: 2023-04-03 | End: 2023-04-03

## 2023-04-03 RX ORDER — PROCHLORPERAZINE MALEATE 5 MG
10 TABLET ORAL ONCE
Refills: 0 | Status: DISCONTINUED | OUTPATIENT
Start: 2023-04-03 | End: 2023-04-03

## 2023-04-03 RX ORDER — SUCRALFATE 1 G
1 TABLET ORAL ONCE
Refills: 0 | Status: COMPLETED | OUTPATIENT
Start: 2023-04-03 | End: 2023-04-03

## 2023-04-03 RX ORDER — DIPHENHYDRAMINE HCL 50 MG
25 CAPSULE ORAL ONCE
Refills: 0 | Status: COMPLETED | OUTPATIENT
Start: 2023-04-03 | End: 2023-04-03

## 2023-04-03 RX ORDER — FAMOTIDINE 10 MG/ML
20 INJECTION INTRAVENOUS ONCE
Refills: 0 | Status: COMPLETED | OUTPATIENT
Start: 2023-04-03 | End: 2023-04-03

## 2023-04-03 RX ORDER — ONDANSETRON 8 MG/1
4 TABLET, FILM COATED ORAL ONCE
Refills: 0 | Status: COMPLETED | OUTPATIENT
Start: 2023-04-03 | End: 2023-04-03

## 2023-04-03 RX ORDER — SODIUM CHLORIDE 9 MG/ML
3 INJECTION INTRAMUSCULAR; INTRAVENOUS; SUBCUTANEOUS ONCE
Refills: 0 | Status: COMPLETED | OUTPATIENT
Start: 2023-04-03 | End: 2023-04-03

## 2023-04-03 RX ADMIN — SODIUM CHLORIDE 3 MILLILITER(S): 9 INJECTION INTRAMUSCULAR; INTRAVENOUS; SUBCUTANEOUS at 04:47

## 2023-04-03 RX ADMIN — Medication 1 MILLIGRAM(S): at 12:05

## 2023-04-03 RX ADMIN — ONDANSETRON 4 MILLIGRAM(S): 8 TABLET, FILM COATED ORAL at 08:17

## 2023-04-03 RX ADMIN — MIDAZOLAM HYDROCHLORIDE 5 MILLIGRAM(S): 1 INJECTION, SOLUTION INTRAMUSCULAR; INTRAVENOUS at 00:50

## 2023-04-03 RX ADMIN — Medication 25 MILLIGRAM(S): at 04:46

## 2023-04-03 RX ADMIN — Medication 1 GRAM(S): at 17:12

## 2023-04-03 RX ADMIN — SODIUM CHLORIDE 1000 MILLILITER(S): 9 INJECTION, SOLUTION INTRAVENOUS at 16:15

## 2023-04-03 RX ADMIN — FAMOTIDINE 20 MILLIGRAM(S): 10 INJECTION INTRAVENOUS at 04:46

## 2023-04-03 RX ADMIN — Medication 0.1 MILLIGRAM(S): at 08:17

## 2023-04-03 RX ADMIN — SODIUM CHLORIDE 1000 MILLILITER(S): 9 INJECTION, SOLUTION INTRAVENOUS at 04:47

## 2023-04-03 NOTE — ED PROVIDER NOTE - OBJECTIVE STATEMENT
29 yo M with hx of polysubstance abuse- last ED visit 3/9/23 brought to ED by a friend stating pt is withdrawing from ? opiates.  Pt awake, but non-verbal and refusing to answer question and stay on stretcher.  Multiple verbal attempts made to explain to pt that he needs to stay in the stretcher and refusing to follow commands,  no obvious signs of trauma or injury.

## 2023-04-03 NOTE — ED PROVIDER NOTE - PROGRESS NOTE DETAILS
Anamaria ADKINS: Patient reassesed--no complaints.  Vital signs normal.  Resting comfortably.  A&Ox3.  Speech clear. Gait normal.  Clinically sober.

## 2023-04-03 NOTE — ED ADULT NURSE NOTE - OBJECTIVE STATEMENT
assumed care of pt from triage, pt alert to pain, pt refusing to speak to staff, pt combative hitting/kicking staff and attempting to jump out of his bed, pt unchanged and placed into yellow gown in critical, pt given meds as per MD order, pt belongings secured in locker, pt placed on , pt endorses heroin use in triage

## 2023-04-03 NOTE — ED PROVIDER NOTE - PRO INTERPRETER NEED 2
Is This A New Presentation, Or A Follow-Up?: Skin Lesions
How Severe Is Your Skin Lesion?: moderate
Have Your Skin Lesions Been Treated?: not been treated
Which Family Member (Optional)?: Sister, uncle
English

## 2023-04-03 NOTE — ED PROVIDER NOTE - PATIENT PORTAL LINK FT
You can access the FollowMyHealth Patient Portal offered by [facility] by registering at the following website: [email or web address]. By joining [email or web address]’s FollowMyHealth portal, you will also be able to view your health information using other applications (apps) compatible with our system.

## 2023-04-03 NOTE — ED ADULT NURSE REASSESSMENT NOTE - NS ED NURSE REASSESS COMMENT FT1
Pt resting comfortably in stretcher, resp even and unlabored, pt on , pt c/o abd. pain at this time, endorses that the last time he used heroin was 2 days ago, MD at bedside w/ pt, meds administered per MD order, pt aware of plan of care, safety maintained
patient resting comfortably in stretcher sandwich provided, pt. denies discomfort at this time
Patient laying in stretcher awake and alert staring at ceiling and is refusing to answer RN questioning. Pt spoken to in English and Persian and still refusing to answer. MD aware and awaiting further orders.

## 2023-04-03 NOTE — ED ADULT TRIAGE NOTE - CHIEF COMPLAINT QUOTE
Pt. BIBA for withdrawal. Pt. not answering questions, states he last used heroin 2 days ago. Pt. COWS 7.

## 2023-04-03 NOTE — ED PROVIDER NOTE - CLINICAL SUMMARY MEDICAL DECISION MAKING FREE TEXT BOX
31 yo M with prior ED visits for substance abuse with reported opiate withdrawal.  Pt non-verbal, agitated and refusing to follow verbal commands requiring medication for agitation.  Will check labs, tox, observe, monitor and re-eval

## 2023-04-03 NOTE — ED ADULT TRIAGE NOTE - PRO INTERPRETER NEED 2
Patient called wanting to speak with dr fraga about his test results and some ongoing issues he's having. Please give pt a call   English

## 2023-04-03 NOTE — ED PROVIDER NOTE - NSFOLLOWUPINSTRUCTIONS_ED_ALL_ED_FT
- Follow up with your doctor within 2-3 days.   - Bring results with you to the appointment.   - Return to the ED for any new or worsening symptoms.     Substance Abuse    Chemical dependency is an addiction to drugs or alcohol. It is characterized by the repeated behavior of seeking out and using drugs and alcohol despite harmful consequences to the health and safety of oneself and others. Using drugs in a manner that brought you to an Emergency Room suggests you may have an drug abuse problem. Seek help at a drug addiction center.    SEEK IMMEDIATE MEDICAL CARE IF YOU HAVE ANY OF THE FOLLOWING SYMPTOMS: chest pain, shortness of breath, change in mental status, thoughts about hurting killing yourself, thoughts about hurting or killing somebody else, hallucinations, or worsening depression.

## 2023-05-05 NOTE — ED ADULT NURSE NOTE - HISTORY OF COVID-19 VACCINATION
Vaccine status unknown PAST SURGICAL HISTORY:  Birthmark of skin approx. 1978 - Excision of birthmark from navel area    S/P arthroscopy of shoulder Oct 2013 - Left shoulder    S/P colonoscopy with polypectomy 2012

## 2023-06-13 ENCOUNTER — EMERGENCY (EMERGENCY)
Facility: HOSPITAL | Age: 28
LOS: 1 days | Discharge: HOME | End: 2023-06-13
Attending: EMERGENCY MEDICINE
Payer: MEDICAID

## 2023-06-13 VITALS
OXYGEN SATURATION: 100 % | RESPIRATION RATE: 16 BRPM | TEMPERATURE: 98 F | SYSTOLIC BLOOD PRESSURE: 150 MMHG | HEART RATE: 73 BPM | DIASTOLIC BLOOD PRESSURE: 90 MMHG

## 2023-06-13 DIAGNOSIS — Z90.49 ACQUIRED ABSENCE OF OTHER SPECIFIED PARTS OF DIGESTIVE TRACT: Chronic | ICD-10-CM

## 2023-06-13 DIAGNOSIS — S52.209A UNSPECIFIED FRACTURE OF SHAFT OF UNSPECIFIED ULNA, INITIAL ENCOUNTER FOR CLOSED FRACTURE: Chronic | ICD-10-CM

## 2023-06-13 LAB
ALBUMIN SERPL ELPH-MCNC: 4.8 G/DL — SIGNIFICANT CHANGE UP (ref 3.3–5.2)
ALP SERPL-CCNC: 140 U/L — HIGH (ref 40–120)
ALT FLD-CCNC: 6 U/L — SIGNIFICANT CHANGE UP
ANION GAP SERPL CALC-SCNC: 14 MMOL/L — SIGNIFICANT CHANGE UP (ref 5–17)
AST SERPL-CCNC: 22 U/L — SIGNIFICANT CHANGE UP
BILIRUB SERPL-MCNC: 0.4 MG/DL — SIGNIFICANT CHANGE UP (ref 0.4–2)
BUN SERPL-MCNC: 8.7 MG/DL — SIGNIFICANT CHANGE UP (ref 8–20)
CALCIUM SERPL-MCNC: 9.8 MG/DL — SIGNIFICANT CHANGE UP (ref 8.4–10.5)
CHLORIDE SERPL-SCNC: 99 MMOL/L — SIGNIFICANT CHANGE UP (ref 96–108)
CO2 SERPL-SCNC: 23 MMOL/L — SIGNIFICANT CHANGE UP (ref 22–29)
CREAT SERPL-MCNC: 0.6 MG/DL — SIGNIFICANT CHANGE UP (ref 0.5–1.3)
EGFR: 134 ML/MIN/1.73M2 — SIGNIFICANT CHANGE UP
GLUCOSE SERPL-MCNC: 127 MG/DL — HIGH (ref 70–99)
HCT VFR BLD CALC: 47.2 % — SIGNIFICANT CHANGE UP (ref 39–50)
HGB BLD-MCNC: 16.1 G/DL — SIGNIFICANT CHANGE UP (ref 13–17)
MCHC RBC-ENTMCNC: 29.9 PG — SIGNIFICANT CHANGE UP (ref 27–34)
MCHC RBC-ENTMCNC: 34.1 GM/DL — SIGNIFICANT CHANGE UP (ref 32–36)
MCV RBC AUTO: 87.6 FL — SIGNIFICANT CHANGE UP (ref 80–100)
PLATELET # BLD AUTO: 391 K/UL — SIGNIFICANT CHANGE UP (ref 150–400)
POTASSIUM SERPL-MCNC: 4.2 MMOL/L — SIGNIFICANT CHANGE UP (ref 3.5–5.3)
POTASSIUM SERPL-SCNC: 4.2 MMOL/L — SIGNIFICANT CHANGE UP (ref 3.5–5.3)
PROT SERPL-MCNC: 7.4 G/DL — SIGNIFICANT CHANGE UP (ref 6.6–8.7)
RBC # BLD: 5.39 M/UL — SIGNIFICANT CHANGE UP (ref 4.2–5.8)
RBC # FLD: 13.4 % — SIGNIFICANT CHANGE UP (ref 10.3–14.5)
SODIUM SERPL-SCNC: 136 MMOL/L — SIGNIFICANT CHANGE UP (ref 135–145)
WBC # BLD: 13.78 K/UL — HIGH (ref 3.8–10.5)
WBC # FLD AUTO: 13.78 K/UL — HIGH (ref 3.8–10.5)

## 2023-06-13 PROCEDURE — 99284 EMERGENCY DEPT VISIT MOD MDM: CPT

## 2023-06-13 RX ORDER — FAMOTIDINE 10 MG/ML
20 INJECTION INTRAVENOUS ONCE
Refills: 0 | Status: COMPLETED | OUTPATIENT
Start: 2023-06-13 | End: 2023-06-13

## 2023-06-13 RX ORDER — ONDANSETRON 8 MG/1
4 TABLET, FILM COATED ORAL ONCE
Refills: 0 | Status: COMPLETED | OUTPATIENT
Start: 2023-06-13 | End: 2023-06-13

## 2023-06-13 RX ORDER — SODIUM CHLORIDE 9 MG/ML
1000 INJECTION INTRAMUSCULAR; INTRAVENOUS; SUBCUTANEOUS ONCE
Refills: 0 | Status: COMPLETED | OUTPATIENT
Start: 2023-06-13 | End: 2023-06-13

## 2023-06-13 RX ADMIN — SODIUM CHLORIDE 1000 MILLILITER(S): 9 INJECTION INTRAMUSCULAR; INTRAVENOUS; SUBCUTANEOUS at 22:21

## 2023-06-13 RX ADMIN — ONDANSETRON 4 MILLIGRAM(S): 8 TABLET, FILM COATED ORAL at 22:21

## 2023-06-13 RX ADMIN — FAMOTIDINE 20 MILLIGRAM(S): 10 INJECTION INTRAVENOUS at 22:21

## 2023-06-13 NOTE — ED PROVIDER NOTE - CLINICAL SUMMARY MEDICAL DECISION MAKING FREE TEXT BOX
patient with epigastric pain nausea and vomiting similar to prior episodes.  Has had multiple CAT scans this year.  Her labs, symptom control and reassess. patient with epigastric pain nausea and vomiting similar to prior episodes.  Has had multiple CAT scans this year.  Her labs, symptom control and reassess.    Romy ALEGRIA: symptoms improving, now with benign abdominal exam. Ready for dc, Return precautions given.

## 2023-06-13 NOTE — ED PROVIDER NOTE - PATIENT PORTAL LINK FT
You can access the FollowMyHealth Patient Portal offered by [facility] by registering at the following website: [email or web address]. By joining [employer]’s FollowMyHealth portal, you will also be able to view your health information using other applications (apps) compatible with our system. You can access the FollowMyHealth Patient Portal offered by [facility] by registering at the following website: [email or web address]. By joining [email or web address]’s FollowMyHealth portal, you will also be able to view your health information using other applications (apps) compatible with our system. You can access the FollowMyHealth Patient Portal offered by [facility] by registering at the following website: [email or web address]. By joining [employer]’s FollowMyHealth portal, you will also be able to view your health information using other applications (apps) compatible with our system.

## 2023-06-13 NOTE — ED PROVIDER NOTE - OBJECTIVE STATEMENT
Patient seen multiple times under MRN [id number] and MRN [id number]  27-year-old male history of polysubstance abuse brought in by [employer] for fit for confinement.  While in holding cell patient started having epigastric pain nausea and vomiting.  Nonbloody nonbilious.  Patient states it feels similar to his prior gastritis.  Last heroin and cocaine use 2 days ago as per patient.  Denies diarrhea, fever, urinary symptoms.  Has had multiple CAT scans this year for similar symptoms. Patient seen multiple times under MRN [id number] and MRN [id number]  27-year-old male history of polysubstance abuse brought in by [employer] for fit for confinement.  While in holding cell patient started having epigastric pain nausea and vomiting.  Nonbloody nonbilious.  Patient states it feels similar to his prior gastritis.  Last heroin and cocaine use 2 days ago as per patient.  Denies diarrhea, fever, urinary symptoms.  Has had multiple CAT scans this year for similar symptoms. Patient seen multiple times under MRN [id number] and MRN [id number]  27-year-old male history of polysubstance abuse brought in by [employer] for fit for confinement.  While in holding cell patient started having epigastric pain nausea and vomiting.  Nonbloody nonbilious.  Patient states it feels similar to his prior gastritis.  Last heroin and cocaine use 2 days ago as per patient.  Denies diarrhea, fever, urinary symptoms.  Has had multiple CAT scans this year for similar symptoms.

## 2023-06-13 NOTE — ED ADULT TRIAGE NOTE - CHIEF COMPLAINT QUOTE
patient under arrest with pd and ems stating he has abdominal pain, nausea, is flailing, stating he last used heroin 2 days ago, pd needs ffc

## 2023-06-13 NOTE — ED PROVIDER NOTE - PHYSICAL EXAMINATION
Gen: Well appearing  male with poor hygiene in NAD  Head: NC/AT  Neck: trachea midline  Resp:  No distress, CTAB  CV: RRR  GI: soft,  nondistended, mild epigastric tenderness palpation.  Ext: no deformities, no LE edema  Neuro:  A&O appears non focal  Skin:  Warm and dry as visualized  Psych:  Normal affect and mood

## 2023-06-13 NOTE — ED PROVIDER NOTE - NSFOLLOWUPINSTRUCTIONS_ED_ALL_ED_FT
- Follow up with your doctor within 2-3 days.   - Bring results with you to the appointment.   - Return to the ED for any new or worsening symptoms.     Abdominal Pain    Many things can cause abdominal pain. Many times, abdominal pain is not caused by a disease and will improve without treatment. Your health care provider will do a physical exam to determine if there is a dangerous cause of your pain; blood tests and imaging may help determine the cause of your pain. However, in many cases, no cause may be found and you may need further testing as an outpatient. Monitor your abdominal pain for any changes.     SEEK IMMEDIATE MEDICAL CARE IF YOU HAVE ANY OF THE FOLLOWING SYMPTOMS: worsening abdominal pain, uncontrollable vomiting, profuse diarrhea, inability to have bowel movements or pass gas, black or bloody stools, fever accompanying chest pain or back pain, or fainting. These symptoms may represent a serious problem that is an emergency. Do not wait to see if the symptoms will go away. Get medical help right away. Call 911 and do not drive yourself to the hospital.

## 2023-06-14 ENCOUNTER — EMERGENCY (EMERGENCY)
Facility: HOSPITAL | Age: 28
LOS: 1 days | Discharge: HOME | End: 2023-06-14
Attending: STUDENT IN AN ORGANIZED HEALTH CARE EDUCATION/TRAINING PROGRAM
Payer: MEDICAID

## 2023-06-14 VITALS
SYSTOLIC BLOOD PRESSURE: 132 MMHG | HEART RATE: 75 BPM | RESPIRATION RATE: 18 BRPM | OXYGEN SATURATION: 97 % | DIASTOLIC BLOOD PRESSURE: 75 MMHG | TEMPERATURE: 98 F

## 2023-06-14 VITALS
WEIGHT: 119.93 LBS | RESPIRATION RATE: 18 BRPM | HEART RATE: 62 BPM | SYSTOLIC BLOOD PRESSURE: 130 MMHG | TEMPERATURE: 99 F | DIASTOLIC BLOOD PRESSURE: 82 MMHG | OXYGEN SATURATION: 100 %

## 2023-06-14 LAB
ALBUMIN SERPL ELPH-MCNC: 5.1 G/DL — SIGNIFICANT CHANGE UP (ref 3.3–5.2)
ALP SERPL-CCNC: 150 U/L — HIGH (ref 40–120)
ALT FLD-CCNC: 7 U/L — SIGNIFICANT CHANGE UP
ANION GAP SERPL CALC-SCNC: 15 MMOL/L — SIGNIFICANT CHANGE UP (ref 5–17)
ANION GAP SERPL CALC-SCNC: 19 MMOL/L — HIGH (ref 5–17)
APAP SERPL-MCNC: <3 UG/ML — LOW (ref 10–26)
AST SERPL-CCNC: 36 U/L — SIGNIFICANT CHANGE UP
BASOPHILS # BLD AUTO: 0.02 K/UL — SIGNIFICANT CHANGE UP (ref 0–0.2)
BASOPHILS NFR BLD AUTO: 0.2 % — SIGNIFICANT CHANGE UP (ref 0–2)
BILIRUB SERPL-MCNC: 0.7 MG/DL — SIGNIFICANT CHANGE UP (ref 0.4–2)
BUN SERPL-MCNC: 13.5 MG/DL — SIGNIFICANT CHANGE UP (ref 8–20)
BUN SERPL-MCNC: 13.6 MG/DL — SIGNIFICANT CHANGE UP (ref 8–20)
CALCIUM SERPL-MCNC: 10.3 MG/DL — SIGNIFICANT CHANGE UP (ref 8.4–10.5)
CALCIUM SERPL-MCNC: 8.7 MG/DL — SIGNIFICANT CHANGE UP (ref 8.4–10.5)
CHLORIDE SERPL-SCNC: 103 MMOL/L — SIGNIFICANT CHANGE UP (ref 96–108)
CHLORIDE SERPL-SCNC: 96 MMOL/L — SIGNIFICANT CHANGE UP (ref 96–108)
CO2 SERPL-SCNC: 23 MMOL/L — SIGNIFICANT CHANGE UP (ref 22–29)
CREAT SERPL-MCNC: 0.53 MG/DL — SIGNIFICANT CHANGE UP (ref 0.5–1.3)
CREAT SERPL-MCNC: 0.56 MG/DL — SIGNIFICANT CHANGE UP (ref 0.5–1.3)
EGFR: 137 ML/MIN/1.73M2 — SIGNIFICANT CHANGE UP
EGFR: 139 ML/MIN/1.73M2 — SIGNIFICANT CHANGE UP
EOSINOPHIL # BLD AUTO: 0 K/UL — SIGNIFICANT CHANGE UP (ref 0–0.5)
EOSINOPHIL NFR BLD AUTO: 0 % — SIGNIFICANT CHANGE UP (ref 0–6)
ETHANOL SERPL-MCNC: <10 MG/DL — SIGNIFICANT CHANGE UP (ref 0–9)
GLUCOSE SERPL-MCNC: 103 MG/DL — HIGH (ref 70–99)
GLUCOSE SERPL-MCNC: 94 MG/DL — SIGNIFICANT CHANGE UP (ref 70–99)
HCT VFR BLD CALC: 51.3 % — HIGH (ref 39–50)
HGB BLD-MCNC: 18 G/DL — HIGH (ref 13–17)
IMM GRANULOCYTES NFR BLD AUTO: 0.3 % — SIGNIFICANT CHANGE UP (ref 0–0.9)
LIDOCAIN IGE QN: 23 U/L — SIGNIFICANT CHANGE UP (ref 22–51)
LYMPHOCYTES # BLD AUTO: 1.76 K/UL — SIGNIFICANT CHANGE UP (ref 1–3.3)
LYMPHOCYTES # BLD AUTO: 13.7 % — SIGNIFICANT CHANGE UP (ref 13–44)
MAGNESIUM SERPL-MCNC: 2.3 MG/DL — SIGNIFICANT CHANGE UP (ref 1.6–2.6)
MCHC RBC-ENTMCNC: 30.2 PG — SIGNIFICANT CHANGE UP (ref 27–34)
MCHC RBC-ENTMCNC: 35.1 GM/DL — SIGNIFICANT CHANGE UP (ref 32–36)
MCV RBC AUTO: 85.9 FL — SIGNIFICANT CHANGE UP (ref 80–100)
MONOCYTES # BLD AUTO: 0.57 K/UL — SIGNIFICANT CHANGE UP (ref 0–0.9)
MONOCYTES NFR BLD AUTO: 4.4 % — SIGNIFICANT CHANGE UP (ref 2–14)
NEUTROPHILS # BLD AUTO: 10.43 K/UL — HIGH (ref 1.8–7.4)
NEUTROPHILS NFR BLD AUTO: 81.4 % — HIGH (ref 43–77)
PLATELET # BLD AUTO: 446 K/UL — HIGH (ref 150–400)
POTASSIUM SERPL-MCNC: 3.5 MMOL/L — SIGNIFICANT CHANGE UP (ref 3.5–5.3)
POTASSIUM SERPL-MCNC: 5 MMOL/L — SIGNIFICANT CHANGE UP (ref 3.5–5.3)
POTASSIUM SERPL-SCNC: 3.5 MMOL/L — SIGNIFICANT CHANGE UP (ref 3.5–5.3)
POTASSIUM SERPL-SCNC: 5 MMOL/L — SIGNIFICANT CHANGE UP (ref 3.5–5.3)
PROT SERPL-MCNC: 8.8 G/DL — HIGH (ref 6.6–8.7)
RBC # BLD: 5.97 M/UL — HIGH (ref 4.2–5.8)
RBC # FLD: 13.8 % — SIGNIFICANT CHANGE UP (ref 10.3–14.5)
SALICYLATES SERPL-MCNC: <0.6 MG/DL — LOW (ref 10–20)
SODIUM SERPL-SCNC: 138 MMOL/L — SIGNIFICANT CHANGE UP (ref 135–145)
SODIUM SERPL-SCNC: 141 MMOL/L — SIGNIFICANT CHANGE UP (ref 135–145)
T4 AB SER-ACNC: 10.7 UG/DL — SIGNIFICANT CHANGE UP (ref 4.5–12)
TSH SERPL-MCNC: <0.1 UIU/ML — LOW (ref 0.27–4.2)
WBC # BLD: 12.82 K/UL — HIGH (ref 3.8–10.5)
WBC # FLD AUTO: 12.82 K/UL — HIGH (ref 3.8–10.5)

## 2023-06-14 PROCEDURE — 99284 EMERGENCY DEPT VISIT MOD MDM: CPT

## 2023-06-14 PROCEDURE — 70450 CT HEAD/BRAIN W/O DYE: CPT | Mod: 26,MA

## 2023-06-14 PROCEDURE — 72125 CT NECK SPINE W/O DYE: CPT | Mod: 26,MA

## 2023-06-14 RX ORDER — FAMOTIDINE 10 MG/ML
20 INJECTION INTRAVENOUS ONCE
Refills: 0 | Status: COMPLETED | OUTPATIENT
Start: 2023-06-14 | End: 2023-06-14

## 2023-06-14 RX ORDER — SODIUM CHLORIDE 9 MG/ML
1000 INJECTION INTRAMUSCULAR; INTRAVENOUS; SUBCUTANEOUS ONCE
Refills: 0 | Status: COMPLETED | OUTPATIENT
Start: 2023-06-14 | End: 2023-06-14

## 2023-06-14 RX ORDER — KETOROLAC TROMETHAMINE 30 MG/ML
15 SYRINGE (ML) INJECTION ONCE
Refills: 0 | Status: DISCONTINUED | OUTPATIENT
Start: 2023-06-14 | End: 2023-06-14

## 2023-06-14 RX ADMIN — Medication 15 MILLIGRAM(S): at 19:32

## 2023-06-14 RX ADMIN — SODIUM CHLORIDE 1000 MILLILITER(S): 9 INJECTION INTRAMUSCULAR; INTRAVENOUS; SUBCUTANEOUS at 19:32

## 2023-06-14 RX ADMIN — Medication 0.1 MILLIGRAM(S): at 19:33

## 2023-06-14 RX ADMIN — FAMOTIDINE 20 MILLIGRAM(S): 10 INJECTION INTRAVENOUS at 17:19

## 2023-06-14 RX ADMIN — Medication 30 MILLILITER(S): at 17:18

## 2023-06-14 RX ADMIN — SODIUM CHLORIDE 1000 MILLILITER(S): 9 INJECTION INTRAMUSCULAR; INTRAVENOUS; SUBCUTANEOUS at 17:17

## 2023-06-14 NOTE — ED ADULT TRIAGE NOTE - CHIEF COMPLAINT QUOTE
Pt was at  office and had seizure like episode and fell. +hit head.  Pt continually tried to get up and would fall back down. NO hx of seizures.  Pt awake and lethargic.  Priority CT called.

## 2023-06-14 NOTE — ED PROVIDER NOTE - ATTENDING APP SHARED VISIT CONTRIBUTION OF CARE
I personally saw the patient with the HEBER, and completed the key components of the history and physical exam. I then discussed the management plan with the HEBER.    Pt with abd pain and previous visits for same in the past, no acute findings. Pt under arrest and possible seizure like activity although he states he just had pain. Pt with no acute findings on CT. Pt initially with anion gap likely due to dehydration, improved with fluids. Pt no loc while here. Pt with mild epigastric ttp no other tenderness on exam, improving. pt stable for dc under custody

## 2023-06-14 NOTE — ED PROVIDER NOTE - NS ED ATTENDING STATEMENT MOD
This was a shared visit with the HEBER. I reviewed and verified the documentation and independently performed the documented:

## 2023-06-14 NOTE — ED PROVIDER NOTE - OBJECTIVE STATEMENT
27M with pmh polysubstance abuse presenting from [facility] custody. Per PO at bedside, officers were called by other inmates for pt falling, hitting his head and shaking afterwards. Pt tried getting up after incident but kept falling down. States he has epigastric pain x 1 week. Was seen in [facility] ED 1 day ago for same.   Pt denies HA, cp, sob    Last heroin use 3 days ago per pt. Pt is a poor historian and not forthcoming with further information in ROS. 27M with pmh polysubstance abuse presenting from [facility] custody. Per PO at bedside, officers were called by other inmates for pt falling, hitting his head and shaking afterwards. Pt tried getting up after incident but kept falling down. States he has epigastric pain x 1 week. Was seen in [facility] ED 1 day ago for same.   Pt denies HA, cp, sob    Last heroin use 3 days ago per pt. Pt is a poor historian and not forthcoming with further information in ROS. 27M with pmh polysubstance abuse presenting from [facility] custody. Per PO at bedside, officers were called by other inmates for pt falling, hitting his head and shaking afterwards. Pt tried getting up after incident but kept falling down. States he has epigastric pain x 1 week. Was seen in [facility] ED 1 day ago for same.   Pt denies HA, cp, sob    Last heroin use 3 days ago per pt. Pt is a poor historian and not forthcoming with further information in ROS.

## 2023-06-14 NOTE — ED PROVIDER NOTE - NSFOLLOWUPINSTRUCTIONS_ED_ALL_ED_FT
Dolor abdominal en adultos      El dolor abdominal puede tener muchas causas. A menudo, no es grave y mejora sin tratamiento o con tratamiento en la casa. Sin embargo, a veces el dolor abdominal es intenso. El médico revisará deandre antecedentes médicos y le hará un examen físico para tratar de determinar la causa del dolor abdominal.    Siga estas instrucciones en yu casa:  [place] los medicamentos de venta teressa y los recetados solamente gustavo se lo haya indicado el médico. No tome un laxante a menos que se lo haya indicado el médico.  Cielo suficiente líquido para mantener la orina ramila o de color amarillo pálido.   Controle yu afección para lety si hay cambios.  Concurra a todas las visitas de control gustavo se lo haya indicado el médico. [place] es importante.    Comuníquese con un médico si:  El dolor abdominal cambia o empeora.  No tiene apetito o baja de peso sin proponérselo.  Está estreñido o tiene diarrea alissa más de 2 o 3 nae.  Tiene dolor cuando orina o defeca.  El dolor abdominal lo despierta de noche.  El dolor empeora con las comidas, después de comer o con determinados alimentos.  Tiene vómitos y no puede retener nada.  Tiene fiebre.    Solicite ayuda de inmediato si:  El dolor no desaparece tan pronto gustavo el médico le dijo que era esperable.  No puede detener los vómitos.  El dolor se siente solo en zonas del abdomen, gustavo el lado derecho o la parte inferior izquierda del abdomen.  Las heces son sanguinolentas o de color kellen, o de aspecto alquitranado.  Tiene dolor intenso, cólicos, o meteorismo en el abdomen.  Tiene signos de deshidratación, por ejemplo:  Orina oscura, muy escasa o falta de orina.  Labios agrietados.  [place] seca.  Ojos hundidos.  Somnolencia.  Debilidad.    Lesión en la ganga, en adultos  Head Injury, Adult    Hay muchos tipos de lesiones en la ganga. Pueden ser tan leves gustavo un chichón o pueden ser más graves. Algunas de las lesiones graves en la ganga son:    Lesión que provoque un impacto en el cerebro (conmoción cerebral).  Hematoma en el cerebro (contusión). [place] significa que hay hemorragia en el cerebro que puede causar un edema.  Fisura en el cráneo (fractura de cráneo).  Hemorragia en el cerebro que se acumula, se coagula y forma luis alberto protuberancia (hematoma).    Después de luis alberto lesión en la ganga, es posible que deba estar en observación alissa un tiempo en el servicio de emergencias. También puede ser necesario hospitalizarlo.    Después de que ocurre luis alberto lesión en la ganga, la mayoría de los problemas ocurre dentro de las primeras 24 horas, catarino los efectos secundarios pueden aparecer entre 7 y 10 días después de la lesión. Es importante controlar yu afección para lety si hay cambios.     ¿Cuáles son las causas?  Hay muchas causas posibles de luis alberto lesión en la ganga. [place] lesión grave en la ganga puede ocurrirle a alguien que tuvo un accidente automovilístico (colisión en un vehículo de motor). Otras causas de lesiones importantes en la ganga incluyen accidentes en bicicleta o motocicleta, lesiones deportivas y caídas.    Factores de riesgo  Es más probable que esta afección se manifiesta en personas que:    Beben mucho alcohol o usan drogas ilícitas.  Tienen más de 65 años de edad.  Están en riesgo de sufrir caídas.    ¿Cuáles son los síntomas?  Hay muchos síntomas posibles de luis alberto lesión en la ganga. Los síntomas visibles incluyen un moretón, un chichón o luis alberto hemorragia en el lugar de la lesión. Otros síntomas no visibles incluyen:    Somnolencia o no poder mantenerse despierto.  Desmayo.  Dolor de ganga.  Convulsiones.  Mareos.  Confusión.  Problemas de memoria.  Náuseas o vómitos.    Otros síntomas posibles que pueden aparecer después de la lesión en la ganga incluyen los siguientes:    Falta de atención y concentración.  Fatiga o cansarse fácilmente.  Irritabilidad.  Sentir incomodidad cerca de luces brillantes o ruidos keiko.  Ansiedad o depresión.  Trastornos del sueño.    ¿Cómo se diagnostica?  Esta afección suele diagnosticarse en función de los síntomas, de luis alberto descripción de la lesión y de un examen físico. También pueden hacerle estudios de diagnóstico por imágenes, gustavo luis alberto resonancia magnética (RM) o luis alberto exploración por tomografía computarizada (TC). Lo controlarán estrictamente.    ¿Cómo se trata?  El tratamiento de esta afección depende de la gravedad y el tipo de lesión que sufrió. El objetivo principal del tratamiento es prevenir complicaciones y darle tiempo al cerebro para que se recupere.    En el yossi de luis alberto lesión leve en la ganga, es posible que lo envíen a casa, y el tratamiento puede incluir lo siguiente:    Observación. Un adulto responsable debe quedarse con usted alissa 24 horas después de producida la lesión y controlarlo con frecuencia.  [place] físico.  [place] cerebral.  Analgésicos.    En el yossi de luis alberto lesión grave en la ganga, el tratamiento puede incluir lo siguiente:    Observación minuciosa. [place] incluye hospitalización con exámenes físicos frecuentes. Puede necesitar ir a un hospital que se especialice en lesiones en la ganga.  Analgésicos.  Asistencia respiratoria. [place] puede incluir un respirador.  Control de la presión dentro del cerebro (presión intracraneal o PIC). Estos pueden incluir los siguientes:  Monitoreo de la PIC.  Administrar medicamentos para disminuir la PIC.  Cambiar yu posición para disminuir la PIC.  Medicamentos para prevenir convulsiones.  Cirugía para detener la hemorragia o extraer coágulos de gómez (craneotomía).  Cirugía para extraer parte del cráneo (craneotomía descompresiva). [place] permite que el cerebro tenga lugar para hincharse.    Siga estas indicaciones en yu casa:  Actividad    Descanse todo lo posible y evite actividades que leslie físicamente difíciles o agotadoras.  Asegúrese de dormir lo suficiente.  Limite las actividades que requieran pensar mucho o prestar atención, gustavo las siguientes:  Mirar televisión.  Jugar juegos de memoria y armar rompecabezas.  Tareas para el hogar o trabajos relacionados con el empleo.  Trabajar en la computadora, participar en redes sociales y enviar mensajes de texto.  Evite actividades que puedan causar otra lesión en la ganga, gustavo practicar deportes, hasta que el médico lo autorice. Puede ser peligroso tener otra lesión en la ganga antes de que se haya recuperado de la primera.  Pregúntele al médico cuándo puede retomar deandre actividades habituales, incluidos el trabajo o el estudio. [place] al médico un plan escalonado para retomar deandre actividades de forma gradual.  Consulte a yu médico sobre cuándo puede conducir, andar en bicicleta o usar maquinaria pesada. La capacidad para reaccionar puede ser más lenta luego de luis alberto lesión cerebral. Nunca realice estas actividades si se siente mareado.    Estilo de natalie    No cielo alcohol hasta que el médico lo autorice y evite el consumo de drogas. El alcohol y ciertas drogas pueden demorar yu recuperación y ponerlo en riesgo de nuevas lesiones.  Si le resulta más difícil que lo habitual recordar las cosas, escríbalas.  Trate de hacer luis alberto cosa por vez si se distrae con facilidad.  Consulte con familiares y amigos si debe bekah decisiones importantes.  Cuénteles sobre yu lesión, los síntomas y las restricciones a deandre amigos, a yu caitlin, a un colega de confianza y a yu gerente en el trabajo. Pídales que observen si aparecen nuevos problemas o empeoran los existentes.    Instrucciones generales    [place] los medicamentos de venta teressa y los recetados solamente gustavo se lo haya indicado el médico.  [place] a alguien que lo acompañe alissa 24 horas después de la lesión en la ganga. Esta persona debe observar cambios en los síntomas y estar preparada para obtener ayuda médica, si es necesario.  Concurra a todas las visitas de control gustavo se lo haya indicado el médico. [place] es importante.    ¿Cómo se zoltan?  Trabaje para mejorar el equilibrio y la fuerza a fin de evitar caídas.  Use el cinturón de seguridad cuando se encuentre en un vehículo en movimiento.  Use un fareed al andar en bicicleta, esquiar o practicar cualquier otro deporte o actividad que tenga riesgo de lesiones.  Cielo alcohol solo con moderación.  [place] medidas de seguridad en yu hogar, por ejemplo:  Mantenga los pisos y las escaleras en orden.  Coloque barras para sostén en los radha y pasamanos en las escaleras.  Ponga alfombras antideslizantes en pisos y bañeras.  Mejore la iluminación en zonas de penumbra.    Solicite ayuda de inmediato si:  Tiene los siguientes síntomas:  Dolor de ganga intenso que no desaparece con los medicamentos.  Dificultad para caminar, debilidad en los brazos o las piernas, o pérdida del equilibrio.  Secreción transparente o con gómez que proviene de la nariz o de los oídos.  Cambios en la visión.  Convulsiones.  Vomita.  Los síntomas empeoran.  Arrastra las palabras.  Se desmaya.  Está más somnoliento o tiene dificultad para mantenerse despierto.  Las pupilas cambian de tamaño.    Estos síntomas pueden representar un problema grave que constituye luis alberto emergencia. No espere hasta que los síntomas desaparezcan. Solicite atención médica de inmediato. Comuníquese con el servicio de emergencias de yu localidad (911 en los [place]). No conduzca por deandre propios medios hasta el hospital.    Por favor tenga seguimiento con yu doctor primario entre 2 bustos.  Regrese a urgencias por cualquier preocupacion medica. Dolor abdominal en adultos      El dolor abdominal puede tener muchas causas. A menudo, no es grave y mejora sin tratamiento o con tratamiento en la casa. Sin embargo, a veces el dolor abdominal es intenso. El médico revisará deandre antecedentes médicos y le hará un examen físico para tratar de determinar la causa del dolor abdominal.    Siga estas instrucciones en yu casa:  [place] los medicamentos de venta teressa y los recetados solamente gustavo se lo haya indicado el médico. No tome un laxante a menos que se lo haya indicado el médico.  Cielo suficiente líquido para mantener la orina ramila o de color amarillo pálido.   Controle yu afección para lety si hay cambios.  Concurra a todas las visitas de control gustavo se lo haya indicado el médico. [place] es importante.    Comuníquese con un médico si:  El dolor abdominal cambia o empeora.  No tiene apetito o baja de peso sin proponérselo.  Está estreñido o tiene diarrea alissa más de 2 o 3 nae.  Tiene dolor cuando orina o defeca.  El dolor abdominal lo despierta de noche.  El dolor empeora con las comidas, después de comer o con determinados alimentos.  Tiene vómitos y no puede retener nada.  Tiene fiebre.    Solicite ayuda de inmediato si:  El dolor no desaparece tan pronto gustavo el médico le dijo que era esperable.  No puede detener los vómitos.  El dolor se siente solo en zonas del abdomen, gustavo el lado derecho o la parte inferior izquierda del abdomen.  Las heces son sanguinolentas o de color kellen, o de aspecto alquitranado.  Tiene dolor intenso, cólicos, o meteorismo en el abdomen.  Tiene signos de deshidratación, por ejemplo:  Orina oscura, muy escasa o falta de orina.  Labios agrietados.  [place].  Ojos hundidos.  Somnolencia.  Debilidad.    Lesión en la ganga, en adultos  Head Injury, Adult    Hay muchos tipos de lesiones en la ganga. Pueden ser tan leves gustavo un chichón o pueden ser más graves. Algunas de las lesiones graves en la ganga son:    Lesión que provoque un impacto en el cerebro (conmoción cerebral).  Hematoma en el cerebro (contusión). [place] significa que hay hemorragia en el cerebro que puede causar un edema.  Fisura en el cráneo (fractura de cráneo).  Hemorragia en el cerebro que se acumula, se coagula y forma luis alberto protuberancia (hematoma).    Después de luis alberto lesión en la ganga, es posible que deba estar en observación alissa un tiempo en el servicio de emergencias. También puede ser necesario hospitalizarlo.    Después de que ocurre luis alberto lesión en la ganga, la mayoría de los problemas ocurre dentro de las primeras 24 horas, catarino los efectos secundarios pueden aparecer entre 7 y 10 días después de la lesión. Es importante controlar yu afección para lety si hay cambios.     ¿Cuáles son las causas?  Hay muchas causas posibles de luis alberto lesión en la ganga. [place] lesión grave en la ganga puede ocurrirle a alguien que tuvo un accidente automovilístico (colisión en un vehículo de motor). Otras causas de lesiones importantes en la ganga incluyen accidentes en bicicleta o motocicleta, lesiones deportivas y caídas.    Factores de riesgo  Es más probable que esta afección se manifiesta en personas que:    Beben mucho alcohol o usan drogas ilícitas.  Tienen más de 65 años de edad.  Están en riesgo de sufrir caídas.    ¿Cuáles son los síntomas?  Hay muchos síntomas posibles de luis alberto lesión en la ganga. Los síntomas visibles incluyen un moretón, un chichón o luis alberto hemorragia en el lugar de la lesión. Otros síntomas no visibles incluyen:    Somnolencia o no poder mantenerse despierto.  Desmayo.  Dolor de ganga.  Convulsiones.  Mareos.  Confusión.  Problemas de memoria.  Náuseas o vómitos.    Otros síntomas posibles que pueden aparecer después de la lesión en la ganga incluyen los siguientes:    Falta de atención y concentración.  Fatiga o cansarse fácilmente.  Irritabilidad.  Sentir incomodidad cerca de luces brillantes o ruidos keiko.  Ansiedad o depresión.  Trastornos del sueño.    ¿Cómo se diagnostica?  Esta afección suele diagnosticarse en función de los síntomas, de luis alberto descripción de la lesión y de un examen físico. También pueden hacerle estudios de diagnóstico por imágenes, gustavo luis alberto resonancia magnética (RM) o luis alberto exploración por tomografía computarizada (TC). Lo controlarán estrictamente.    ¿Cómo se trata?  El tratamiento de esta afección depende de la gravedad y el tipo de lesión que sufrió. El objetivo principal del tratamiento es prevenir complicaciones y darle tiempo al cerebro para que se recupere.    En el yossi de luis alberto lesión leve en la ganga, es posible que lo envíen a casa, y el tratamiento puede incluir lo siguiente:    Observación. Un adulto responsable debe quedarse con usted alissa 24 horas después de producida la lesión y controlarlo con frecuencia.  [place] físico.  [place] cerebral.  Analgésicos.    En el yossi de luis alberto lesión grave en la ganga, el tratamiento puede incluir lo siguiente:    Observación minuciosa. [place] incluye hospitalización con exámenes físicos frecuentes. Puede necesitar ir a un hospital que se especialice en lesiones en la ganga.  Analgésicos.  Asistencia respiratoria. [place] puede incluir un respirador.  Control de la presión dentro del cerebro (presión intracraneal o PIC). Estos pueden incluir los siguientes:  Monitoreo de la PIC.  Administrar medicamentos para disminuir la PIC.  Cambiar yu posición para disminuir la PIC.  Medicamentos para prevenir convulsiones.  [place] para detener la hemorragia o extraer coágulos de gómez (craneotomía).  Cirugía para extraer parte del cráneo (craneotomía descompresiva). [place] permite que el cerebro tenga lugar para hincharse.    Siga estas indicaciones en yu casa:  Actividad    Descanse todo lo posible y evite actividades que leslie físicamente difíciles o agotadoras.  Asegúrese de dormir lo suficiente.  Limite las actividades que requieran pensar mucho o prestar atención, gustavo las siguientes:  Mirar televisión.  Jugar juegos de memoria y armar rompecabezas.  Tareas para el hogar o trabajos relacionados con el empleo.  Trabajar en la computadora, participar en redes sociales y enviar mensajes de texto.  Evite actividades que puedan causar otra lesión en la ganga, gustavo practicar deportes, hasta que el médico lo autorice. Puede ser peligroso tener otra lesión en la ganga antes de que se haya recuperado de la primera.  Pregúntele al médico cuándo puede retomar deandre actividades habituales, incluidos el trabajo o el estudio. [place] al médico un plan escalonado para retomar deandre actividades de forma gradual.  Consulte a yu médico sobre cuándo puede conducir, andar en bicicleta o usar maquinaria pesada. La capacidad para reaccionar puede ser más lenta luego de luis alberto lesión cerebral. Nunca realice estas actividades si se siente mareado.    Estilo de natalie    No cielo alcohol hasta que el médico lo autorice y evite el consumo de drogas. El alcohol y ciertas drogas pueden demorar yu recuperación y ponerlo en riesgo de nuevas lesiones.  Si le resulta más difícil que lo habitual recordar las cosas, escríbalas.  Trate de hacer luis alberto cosa por vez si se distrae con facilidad.  Consulte con familiares y amigos si debe bekah decisiones importantes.  Cuénteles sobre yu lesión, los síntomas y las restricciones a deandre amigos, a yu caitlin, a un colega de confianza y a yu gerente en el trabajo. Pídales que observen si aparecen nuevos problemas o empeoran los existentes.    Instrucciones generales    [place] los medicamentos de venta teressa y los recetados solamente gustavo se lo haya indicado el médico.  [place] a alguien que lo acompañe alissa 24 horas después de la lesión en la ganga. Esta persona debe observar cambios en los síntomas y estar preparada para obtener ayuda médica, si es necesario.  Concurra a todas las visitas de control gustavo se lo haya indicado el médico. [place] es importante.    ¿Cómo se zoltan?  Trabaje para mejorar el equilibrio y la fuerza a fin de evitar caídas.  Use el cinturón de seguridad cuando se encuentre en un vehículo en movimiento.  Use un fareed al andar en bicicleta, esquiar o practicar cualquier otro deporte o actividad que tenga riesgo de lesiones.  Cielo alcohol solo con moderación.  [place] medidas de seguridad en yu hogar, por ejemplo:  Mantenga los pisos y las escaleras en orden.  Coloque barras para sostén en los radha y pasamanos en las escaleras.  Ponga alfombras antideslizantes en pisos y bañeras.  Mejore la iluminación en zonas de penumbra.    Solicite ayuda de inmediato si:  Tiene los siguientes síntomas:  Dolor de ganga intenso que no desaparece con los medicamentos.  Dificultad para caminar, debilidad en los brazos o las piernas, o pérdida del equilibrio.  Secreción transparente o con gómez que proviene de la nariz o de los oídos.  Cambios en la visión.  Convulsiones.  Vomita.  Los síntomas empeoran.  Arrastra las palabras.  Se desmaya.  Está más somnoliento o tiene dificultad para mantenerse despierto.  Las pupilas cambian de tamaño.    Estos síntomas pueden representar un problema grave que constituye luis alberto emergencia. No espere hasta que los síntomas desaparezcan. Solicite atención médica de inmediato. Comuníquese con el servicio de emergencias de yu localidad (911 en [place]). No conduzca por deandre propios medios hasta el hospital.    Por favor tenga seguimiento con yu doctor primario entre 2 bustos.  Regrese a urgencias por cualquier preocupacion medica. Dolor abdominal en adultos      El dolor abdominal puede tener muchas causas. A menudo, no es grave y mejora sin tratamiento o con tratamiento en la casa. Sin embargo, a veces el dolor abdominal es intenso. El médico revisará deandre antecedentes médicos y le hará un examen físico para tratar de determinar la causa del dolor abdominal.    Siga estas instrucciones en yu casa:  [place] los medicamentos de venta teressa y los recetados solamente gustavo se lo haya indicado el médico. No tome un laxante a menos que se lo haya indicado el médico.  Cielo suficiente líquido para mantener la orina ramila o de color amarillo pálido.   Controle yu afección para lety si hay cambios.  Concurra a todas las visitas de control gustavo se lo haya indicado el médico. [place] es importante.    Comuníquese con un médico si:  El dolor abdominal cambia o empeora.  No tiene apetito o baja de peso sin proponérselo.  Está estreñido o tiene diarrea alissa más de 2 o 3 nae.  Tiene dolor cuando orina o defeca.  El dolor abdominal lo despierta de noche.  El dolor empeora con las comidas, después de comer o con determinados alimentos.  Tiene vómitos y no puede retener nada.  Tiene fiebre.    Solicite ayuda de inmediato si:  El dolor no desaparece tan pronto gustavo el médico le dijo que era esperable.  No puede detener los vómitos.  El dolor se siente solo en zonas del abdomen, gustavo el lado derecho o la parte inferior izquierda del abdomen.  Las heces son sanguinolentas o de color kellen, o de aspecto alquitranado.  Tiene dolor intenso, cólicos, o meteorismo en el abdomen.  Tiene signos de deshidratación, por ejemplo:  Orina oscura, muy escasa o falta de orina.  Labios agrietados.  [place].  Ojos hundidos.  Somnolencia.  Debilidad.    Lesión en la ganga, en adultos  Head Injury, Adult    Hay muchos tipos de lesiones en la ganga. Pueden ser tan leves gustavo un chichón o pueden ser más graves. Algunas de las lesiones graves en la ganga son:    Lesión que provoque un impacto en el cerebro (conmoción cerebral).  Hematoma en el cerebro (contusión). [place] significa que hay hemorragia en el cerebro que puede causar un edema.  Fisura en el cráneo (fractura de cráneo).  Hemorragia en el cerebro que se acumula, se coagula y forma luis alberto protuberancia (hematoma).    Después de luis alberto lesión en la ganga, es posible que deba estar en observación alissa un tiempo en el servicio de emergencias. También puede ser necesario hospitalizarlo.    Después de que ocurre luis alberto lesión en la ganga, la mayoría de los problemas ocurre dentro de las primeras 24 horas, catarino los efectos secundarios pueden aparecer entre 7 y 10 días después de la lesión. Es importante controlar yu afección para lety si hay cambios.     ¿Cuáles son las causas?  Hay muchas causas posibles de luis alberto lesión en la agnga. [place] lesión grave en la ganga puede ocurrirle a alguien que tuvo un accidente automovilístico (colisión en un vehículo de motor). Otras causas de lesiones importantes en la ganga incluyen accidentes en bicicleta o motocicleta, lesiones deportivas y caídas.    Factores de riesgo  Es más probable que esta afección se manifiesta en personas que:    Beben mucho alcohol o usan drogas ilícitas.  Tienen más de 65 años de edad.  Están en riesgo de sufrir caídas.    ¿Cuáles son los síntomas?  Hay muchos síntomas posibles de luis alberto lesión en la ganga. Los síntomas visibles incluyen un moretón, un chichón o luis alberto hemorragia en el lugar de la lesión. Otros síntomas no visibles incluyen:    Somnolencia o no poder mantenerse despierto.  Desmayo.  Dolor de ganga.  Convulsiones.  Mareos.  Confusión.  Problemas de memoria.  Náuseas o vómitos.    Otros síntomas posibles que pueden aparecer después de la lesión en la ganga incluyen los siguientes:    Falta de atención y concentración.  Fatiga o cansarse fácilmente.  Irritabilidad.  Sentir incomodidad cerca de luces brillantes o ruidos keiko.  Ansiedad o depresión.  Trastornos del sueño.    ¿Cómo se diagnostica?  Esta afección suele diagnosticarse en función de los síntomas, de luis alberto descripción de la lesión y de un examen físico. También pueden hacerle estudios de diagnóstico por imágenes, gustavo luis alberto resonancia magnética (RM) o luis alberto exploración por tomografía computarizada (TC). Lo controlarán estrictamente.    ¿Cómo se trata?  El tratamiento de esta afección depende de la gravedad y el tipo de lesión que sufrió. El objetivo principal del tratamiento es prevenir complicaciones y darle tiempo al cerebro para que se recupere.    En el yossi de luis alberto lesión leve en la ganga, es posible que lo envíen a casa, y el tratamiento puede incluir lo siguiente:    Observación. Un adulto responsable debe quedarse con usted alissa 24 horas después de producida la lesión y controlarlo con frecuencia.  [place] físico.  [place] cerebral.  Analgésicos.    En el yossi de luis alberto lesión grave en la ganga, el tratamiento puede incluir lo siguiente:    Observación minuciosa. [place] incluye hospitalización con exámenes físicos frecuentes. Puede necesitar ir a un hospital que se especialice en lesiones en la ganga.  Analgésicos.  Asistencia respiratoria. [place] puede incluir un respirador.  Control de la presión dentro del cerebro (presión intracraneal o PIC). Estos pueden incluir los siguientes:  Monitoreo de la PIC.  Administrar medicamentos para disminuir la PIC.  Cambiar yu posición para disminuir la PIC.  Medicamentos para prevenir convulsiones.  Cirugía para detener la hemorragia o extraer coágulos de gómez (craneotomía).  Cirugía para extraer parte del cráneo (craneotomía descompresiva). [place] permite que el cerebro tenga lugar para hincharse.    Siga estas indicaciones en yu casa:  Actividad    Descanse todo lo posible y evite actividades que leslie físicamente difíciles o agotadoras.  Asegúrese de dormir lo suficiente.  Limite las actividades que requieran pensar mucho o prestar atención, gustavo las siguientes:  Mirar televisión.  Jugar juegos de memoria y armar rompecabezas.  Tareas para el hogar o trabajos relacionados con el empleo.  Trabajar en la computadora, participar en redes sociales y enviar mensajes de texto.  Evite actividades que puedan causar otra lesión en la ganga, gustavo practicar deportes, hasta que el médico lo autorice. Puede ser peligroso tener otra lesión en la ganga antes de que se haya recuperado de la primera.  Pregúntele al médico cuándo puede retomar deandre actividades habituales, incluidos el trabajo o el estudio. Pídale al médico un plan escalonado para retomar deandre actividades de forma gradual.  Consulte a yu médico sobre cuándo puede conducir, andar en bicicleta o usar maquinaria pesada. La capacidad para reaccionar puede ser más lenta luego de luis alberto lesión cerebral. Nunca realice estas actividades si se siente mareado.    Estilo de natalie    No cielo alcohol hasta que el médico lo autorice y evite el consumo de drogas. El alcohol y ciertas drogas pueden demorar yu recuperación y ponerlo en riesgo de nuevas lesiones.  Si le resulta más difícil que lo habitual recordar las cosas, escríbalas.  Trate de hacer luis alberto cosa por vez si se distrae con facilidad.  Consulte con familiares y amigos si debe bekah decisiones importantes.  Cuénteles sobre yu lesión, los síntomas y las restricciones a deandre amigos, a yu caitlin, a un colega de confianza y a yu gerente en el trabajo. Pídales que observen si aparecen nuevos problemas o empeoran los existentes.    Instrucciones generales    Glandorf los medicamentos de venta teressa y los recetados solamente gustavo se lo haya indicado el médico.  Pídale a alguien que lo acompañe alissa 24 horas después de la lesión en la ganga. Esta persona debe observar cambios en los síntomas y estar preparada para obtener ayuda médica, si es necesario.  Concurra a todas las visitas de control gustavo se lo haya indicado el médico. [place]enda es importante.    ¿Cómo se zoltan?  Trabaje para mejorar el equilibrio y la fuerza a fin de evitar caídas.  Use el cinturón de seguridad cuando se encuentre en un vehículo en movimiento.  Use un fareed al andar en bicicleta, esquiar o practicar cualquier otro deporte o actividad que tenga riesgo de lesiones.  Cielo alcohol solo con moderación.  Glandorf medidas de seguridad en yu hogar, por ejemplo:  Mantenga los pisos y las escaleras en orden.  Coloque barras para sostén en los radha y pasamanos en las escaleras.  Ponga alfombras antideslizantes en pisos y bañeras.  Mejore la iluminación en zonas de penumbra.    Solicite ayuda de inmediato si:  Tiene los siguientes síntomas:  Dolor de ganga intenso que no desaparece con los medicamentos.  Dificultad para caminar, debilidad en los brazos o las piernas, o pérdida del equilibrio.  Secreción transparente o con gómez que proviene de la nariz o de los oídos.  Cambios en la visión.  Convulsiones.  Vomita.  Los síntomas empeoran.  Arrastra las palabras.  Se desmaya.  Está más somnoliento o tiene dificultad para mantenerse despierto.  Las pupilas cambian de tamaño.    Estos síntomas pueden representar un problema grave que constituye luis alberto emergencia. No espere hasta que los síntomas desaparezcan. Solicite atención médica de inmediato. Comuníquese con el servicio de emergencias de yu localidad (911 en los [place]). No conduzca por deandre propios medios hasta el hospital.    Por favor tenga seguimiento con yu doctor primario entre 2 bustos.  Regrese a urgencias por cualquier preocupacion medica.
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
## 2023-06-14 NOTE — ED ADULT NURSE NOTE - OBJECTIVE STATEMENT
Pt is a 27YOM who is here for a "seizure like episode", pt was in custody at the time,  states pt tried to get back up on several occassions and fell back down, pt denies any seizure hx, pt is a&o4, pt states that he has a hx of heroin abuse and his last use was 3 days ago, pt states he is likely in withdrawal, pt denies any pain or discomfort at this time, pt has no shortness of breath, difficulty breathing, complains of abdominal cramping and anxiety due to his withdrawal.

## 2023-06-14 NOTE — ED PROVIDER NOTE - CARE PLAN
Principal Discharge DX:	Abdominal pain  Secondary Diagnosis:	CHI (closed head injury), initial encounter   1

## 2023-06-14 NOTE — ED ADULT TRIAGE NOTE - ARRIVAL FROM
[facility] office [facility] office [facility] office

## 2023-06-14 NOTE — ED PROVIDER NOTE - PATIENT PORTAL LINK FT
You can access the FollowMyHealth Patient Portal offered by [facility] by registering at the following website: [email or web address]. By joining [employer]’s FollowMyHealth portal, you will also be able to view your health information using other applications (apps) compatible with our system. You can access the FollowMyHealth Patient Portal offered by [facility] by registering at the following website: [email or web address]. By joining [employer]’s FollowMyHealth portal, you will also be able to view your health information using other applications (apps) compatible with our system. You can access the FollowMyHealth Patient Portal offered by [facility] by registering at the following website: [email or web address]. By joining [employer]’s FollowMyHealth portal, you will also be able to view your health information using other applications (apps) compatible with our system.

## 2023-06-14 NOTE — ED ADULT NURSE NOTE - NSFALLRISKINTERV_ED_ALL_ED
Communicate fall risk and risk factors to all staff, patient, and family/Provide visual cue: yellow wristband, yellow gown, etc/Reinforce activity limits and safety measures with patient and family/Use of alarms - bed, stretcher, chair and/or video monitoring/Call bell, personal items and telephone in reach/Instruct patient to call for assistance before getting out of bed/chair/stretcher/Non-slip footwear applied when patient is off stretcher/[place] to call system/Physically safe environment - no spills, clutter or unnecessary equipment/Purposeful Proactive Rounding/Room/bathroom lighting operational, light cord in reach Communicate fall risk and risk factors to all staff, patient, and family/Provide visual cue: yellow wristband, yellow gown, etc/Reinforce activity limits and safety measures with patient and family/Use of alarms - bed, stretcher, chair and/or video monitoring/Call bell, personal items and telephone in reach/Instruct patient to call for assistance before getting out of bed/chair/stretcher/Non-slip footwear applied when patient is off stretcher/[place] to call system/Physically safe environment - no spills, clutter or unnecessary equipment/Purposeful Proactive Rounding/Room/bathroom lighting operational, light cord in reach Communicate fall risk and risk factors to all staff, patient, and family/Provide visual cue: yellow wristband, yellow gown, etc/Reinforce activity limits and safety measures with patient and family/Use of alarms - bed, stretcher, chair and/or video monitoring/Call bell, personal items and telephone in reach/Instruct patient to call for assistance before getting out of bed/chair/stretcher/Non-slip footwear applied when patient is off stretcher/[place] to call system/Physically safe environment - no spills, clutter or unnecessary equipment/Purposeful Proactive Rounding/Room/bathroom lighting operational, light cord in reach

## 2023-06-14 NOTE — ED ADULT NURSE NOTE - CAS ELECT INFOMATION PROVIDED
DC instructions given and verbalized understanding. Pt remains alert and O x4. NAD noted. Resp E/U. Pt denies any pain./DC instructions

## 2023-06-14 NOTE — ED PROVIDER NOTE - PHYSICAL EXAMINATION
Gen: No acute distress, non toxic. pt placed blanket over his head.   HEENT: NCAT. Mucous membranes moist, pink conjunctivae, EOMI  CV: RRR, nl s1/s2.  Resp: CTAB, normal rate and effort  GI: Abdomen soft, ND. No rebound, no guarding. +epigastric TTP  : No CVAT  Neuro: alert, moving all 4 extremities. pt refusing to participate in further neuro exam   MSK: No spine or joint tenderness to palpation  Skin: No rashes. intact and perfused.

## 2023-06-15 ENCOUNTER — EMERGENCY (EMERGENCY)
Facility: HOSPITAL | Age: 28
LOS: 1 days | Discharge: LEFT BEFORE DISCHARGE | End: 2023-06-15
Attending: EMERGENCY MEDICINE
Payer: SELF-PAY

## 2023-06-15 VITALS
HEIGHT: 67 IN | HEART RATE: 62 BPM | SYSTOLIC BLOOD PRESSURE: 149 MMHG | DIASTOLIC BLOOD PRESSURE: 84 MMHG | OXYGEN SATURATION: 100 % | TEMPERATURE: 98 F | WEIGHT: 119.93 LBS | RESPIRATION RATE: 19 BRPM

## 2023-06-15 DIAGNOSIS — Z90.49 ACQUIRED ABSENCE OF OTHER SPECIFIED PARTS OF DIGESTIVE TRACT: Chronic | ICD-10-CM

## 2023-06-15 DIAGNOSIS — Z98.890 OTHER SPECIFIED POSTPROCEDURAL STATES: Chronic | ICD-10-CM

## 2023-06-15 LAB
ALBUMIN SERPL ELPH-MCNC: 4.9 G/DL — SIGNIFICANT CHANGE UP (ref 3.3–5.2)
ALP SERPL-CCNC: 130 U/L — HIGH (ref 40–120)
ALT FLD-CCNC: 6 U/L — SIGNIFICANT CHANGE UP
ANION GAP SERPL CALC-SCNC: 14 MMOL/L — SIGNIFICANT CHANGE UP (ref 5–17)
APTT BLD: 33.5 SEC — SIGNIFICANT CHANGE UP (ref 27.5–35.5)
AST SERPL-CCNC: 20 U/L — SIGNIFICANT CHANGE UP
BASOPHILS # BLD AUTO: 0.02 K/UL — SIGNIFICANT CHANGE UP (ref 0–0.2)
BASOPHILS NFR BLD AUTO: 0.2 % — SIGNIFICANT CHANGE UP (ref 0–2)
BILIRUB SERPL-MCNC: 0.7 MG/DL — SIGNIFICANT CHANGE UP (ref 0.4–2)
BUN SERPL-MCNC: 14.1 MG/DL — SIGNIFICANT CHANGE UP (ref 8–20)
CALCIUM SERPL-MCNC: 9.8 MG/DL — SIGNIFICANT CHANGE UP (ref 8.4–10.5)
CHLORIDE SERPL-SCNC: 104 MMOL/L — SIGNIFICANT CHANGE UP (ref 96–108)
CO2 SERPL-SCNC: 24 MMOL/L — SIGNIFICANT CHANGE UP (ref 22–29)
CREAT SERPL-MCNC: 0.63 MG/DL — SIGNIFICANT CHANGE UP (ref 0.5–1.3)
EGFR: 132 ML/MIN/1.73M2 — SIGNIFICANT CHANGE UP
EOSINOPHIL # BLD AUTO: 0.01 K/UL — SIGNIFICANT CHANGE UP (ref 0–0.5)
EOSINOPHIL NFR BLD AUTO: 0.1 % — SIGNIFICANT CHANGE UP (ref 0–6)
GLUCOSE SERPL-MCNC: 102 MG/DL — HIGH (ref 70–99)
HCT VFR BLD CALC: 48.3 % — SIGNIFICANT CHANGE UP (ref 39–50)
HGB BLD-MCNC: 16.5 G/DL — SIGNIFICANT CHANGE UP (ref 13–17)
IMM GRANULOCYTES NFR BLD AUTO: 0.4 % — SIGNIFICANT CHANGE UP (ref 0–0.9)
INR BLD: 1.13 RATIO — SIGNIFICANT CHANGE UP (ref 0.88–1.16)
LIDOCAIN IGE QN: 37 U/L — SIGNIFICANT CHANGE UP (ref 22–51)
LYMPHOCYTES # BLD AUTO: 1.94 K/UL — SIGNIFICANT CHANGE UP (ref 1–3.3)
LYMPHOCYTES # BLD AUTO: 17.8 % — SIGNIFICANT CHANGE UP (ref 13–44)
MAGNESIUM SERPL-MCNC: 2.5 MG/DL — SIGNIFICANT CHANGE UP (ref 1.8–2.6)
MCHC RBC-ENTMCNC: 29.8 PG — SIGNIFICANT CHANGE UP (ref 27–34)
MCHC RBC-ENTMCNC: 34.2 GM/DL — SIGNIFICANT CHANGE UP (ref 32–36)
MCV RBC AUTO: 87.2 FL — SIGNIFICANT CHANGE UP (ref 80–100)
MONOCYTES # BLD AUTO: 0.79 K/UL — SIGNIFICANT CHANGE UP (ref 0–0.9)
MONOCYTES NFR BLD AUTO: 7.3 % — SIGNIFICANT CHANGE UP (ref 2–14)
NEUTROPHILS # BLD AUTO: 8.07 K/UL — HIGH (ref 1.8–7.4)
NEUTROPHILS NFR BLD AUTO: 74.2 % — SIGNIFICANT CHANGE UP (ref 43–77)
NT-PROBNP SERPL-SCNC: 33 PG/ML — SIGNIFICANT CHANGE UP (ref 0–300)
PLATELET # BLD AUTO: 426 K/UL — HIGH (ref 150–400)
POTASSIUM SERPL-MCNC: 4 MMOL/L — SIGNIFICANT CHANGE UP (ref 3.5–5.3)
POTASSIUM SERPL-SCNC: 4 MMOL/L — SIGNIFICANT CHANGE UP (ref 3.5–5.3)
PROT SERPL-MCNC: 7.3 G/DL — SIGNIFICANT CHANGE UP (ref 6.6–8.7)
PROTHROM AB SERPL-ACNC: 13.1 SEC — SIGNIFICANT CHANGE UP (ref 10.5–13.4)
RBC # BLD: 5.54 M/UL — SIGNIFICANT CHANGE UP (ref 4.2–5.8)
RBC # FLD: 13.7 % — SIGNIFICANT CHANGE UP (ref 10.3–14.5)
SODIUM SERPL-SCNC: 142 MMOL/L — SIGNIFICANT CHANGE UP (ref 135–145)
TROPONIN T SERPL-MCNC: <0.01 NG/ML — SIGNIFICANT CHANGE UP (ref 0–0.06)
WBC # BLD: 10.87 K/UL — HIGH (ref 3.8–10.5)
WBC # FLD AUTO: 10.87 K/UL — HIGH (ref 3.8–10.5)

## 2023-06-15 PROCEDURE — 99284 EMERGENCY DEPT VISIT MOD MDM: CPT

## 2023-06-15 PROCEDURE — 71045 X-RAY EXAM CHEST 1 VIEW: CPT | Mod: 26

## 2023-06-15 PROCEDURE — 93010 ELECTROCARDIOGRAM REPORT: CPT | Mod: 76

## 2023-06-15 RX ORDER — ONDANSETRON 8 MG/1
4 TABLET, FILM COATED ORAL ONCE
Refills: 0 | Status: COMPLETED | OUTPATIENT
Start: 2023-06-15 | End: 2023-06-15

## 2023-06-15 RX ORDER — FAMOTIDINE 10 MG/ML
20 INJECTION INTRAVENOUS ONCE
Refills: 0 | Status: COMPLETED | OUTPATIENT
Start: 2023-06-15 | End: 2023-06-15

## 2023-06-15 RX ORDER — ACETAMINOPHEN 500 MG
1000 TABLET ORAL ONCE
Refills: 0 | Status: COMPLETED | OUTPATIENT
Start: 2023-06-15 | End: 2023-06-15

## 2023-06-15 RX ORDER — SODIUM CHLORIDE 9 MG/ML
1000 INJECTION, SOLUTION INTRAVENOUS ONCE
Refills: 0 | Status: COMPLETED | OUTPATIENT
Start: 2023-06-15 | End: 2023-06-15

## 2023-06-15 RX ADMIN — Medication 400 MILLIGRAM(S): at 13:26

## 2023-06-15 RX ADMIN — FAMOTIDINE 20 MILLIGRAM(S): 10 INJECTION INTRAVENOUS at 13:25

## 2023-06-15 RX ADMIN — ONDANSETRON 4 MILLIGRAM(S): 8 TABLET, FILM COATED ORAL at 13:26

## 2023-06-15 RX ADMIN — SODIUM CHLORIDE 1000 MILLILITER(S): 9 INJECTION, SOLUTION INTRAVENOUS at 13:48

## 2023-06-15 NOTE — ED ADULT NURSE NOTE - CHIEF COMPLAINT QUOTE
Pt BIBA from [facility] where pt had syncopal episode.  Pt states he felt weak then stood up and "passed out."  Pt endorsing abdominal pain with "vomiting 20 times today."  No obvious injury noted to pt. Pt BIBA from [facility] where pt had syncopal episode.  Pt states he felt weak then stood up and "passed out."  Pt endorsing abdominal pain with "vomiting 20 times today."  No obvious injury noted to pt. Pt BIBA from [facility] where pt had syncopal episode.  Pt states he felt weak then stood up and "passed out."  Pt endorsing abdominal pain with "vomiting 20 times today."  No obvious injury noted to pt.

## 2023-06-15 NOTE — ED PROVIDER NOTE - PROGRESS NOTE DETAILS
The patient is leaving against medical advice. The patient has the capacity to refuse further medical evaluation and care. I had a lengthy discussion with the patient in which appropriate further evaluation and treatment was offered to the patient as well as acceptable alternative measures, and they declined. The patient understands that as a consequence of this decision they may be at risk for clinical deterioration including arrythmia, heart attack, cardiac arrest, permanent disability and death. The patient understands and verbalized the risks of leaving without further evaulation and treatment. Clear return precautions were discussed. The patient was urged to seek follow-up care, with appropriate referrals made as needed. -Feilpe DO The patient is leaving against medical advice. The patient has the capacity to refuse further medical evaluation and care. I had a lengthy discussion with the patient in which appropriate further evaluation and treatment was offered to the patient as well as acceptable alternative measures, and they declined. The patient understands that as a consequence of this decision they may be at risk for clinical deterioration including arrythmia, heart attack, cardiac arrest, permanent disability and death. The patient understands and verbalized the risks of leaving without further evaulation and treatment. Clear return precautions were discussed. The patient was urged to seek follow-up care, with appropriate referrals made as needed. -Felipe DO

## 2023-06-15 NOTE — ED PROVIDER NOTE - PATIENT PORTAL LINK FT
You can access the FollowMyHealth Patient Portal offered by [facility] by registering at the following website: [email or web address]. By joining [employer]’s FollowMyHealth portal, you will also be able to view your health information using other applications (apps) compatible with our system. You can access the FollowMyHealth Patient Portal offered by [facility] by registering at the following website: [email or web address]. By joining [employer]’s FollowMyHealth portal, you will also be able to view your health information using other applications (apps) compatible with our system. You can access the FollowMyHealth Patient Portal offered by [facility] by registering at the following website: [email or web address]. By joining [employer]’s FollowMyHealth portal, you will also be able to view your health information using other applications (apps) compatible with our system.

## 2023-06-15 NOTE — ED PROVIDER NOTE - NSFOLLOWUPINSTRUCTIONS_ED_ALL_ED_FT
1. Return to ED for worsening, progressive or any other concerning symptoms   2. Follow up with your primary care doctor in 2-3days  3. Follow up with [facility] [phone].     Syncope    Syncope is when you temporarily lose consciousness, also called fainting or passing out. It is caused by a sudden decrease in blood flow to the brain. Even though most causes of syncope are not dangerous, syncope can possibly be a sign of a serious medical problem. Signs that you may be about to faint include feeling dizzy, lightheaded, nausea, visual changes, or cold/clammy skin. Do not drive, operate heavy machinery, or play sports until your health care provider says it is okay.    SEEK IMMEDIATE MEDICAL CARE IF YOU HAVE ANY OF THE FOLLOWING SYMPTOMS: severe headache, pain in your chest/abdomen/back, bleeding from your mouth or rectum, palpitations, shortness of breath, pain with breathing, seizure, confusion, or trouble walking. 1. Return to ED for worsening, progressive or any other concerning symptoms   2. Follow up with your primary care doctor in 2-3days  3. Follow up with [facility] [phone].     Syncope    Syncope is when you temporarily lose consciousness, also called fainting or passing out. It is caused by a sudden decrease in blood flow to the brain. Even though most causes of syncope are not dangerous, syncope can possibly be a sign of a serious medical problem. Signs that you may be about to faint include feeling dizzy, lightheaded, nausea, visual changes, or cold/clammy skin. Do not drive, operate heavy machinery, or play sports until your health care provider says it is okay.    SEEK IMMEDIATE MEDICAL CARE IF YOU HAVE ANY OF THE FOLLOWING SYMPTOMS: severe headache, pain in your chest/abdomen/back, bleeding from your mouth or rectum, palpitations, shortness of breath, pain with breathing, seizure, confusion, or trouble walking. 1. Return to ED for worsening, progressive or any other concerning symptoms   2. Follow up with your primary care doctor in 2-3days  3. Follow up with [facility] [phone].     Syncope    Syncope is when you temporarily lose consciousness, also called fainting or passing out. It is caused by a sudden decrease in blood flow to the brain. Even though most causes of syncope are not dangerous, syncope can possibly be a sign of a serious medical problem. Signs that you may be about to faint include feeling dizzy, lightheaded, nausea, visual changes, or cold/clammy skin. Do not drive, operate heavy machinery, or play sports until your health care provider says it is okay.    SEEK IMMEDIATE MEDICAL CARE IF YOU HAVE ANY OF THE FOLLOWING SYMPTOMS: severe headache, pain in your chest/abdomen/back, bleeding from your mouth or rectum, palpitations, shortness of breath, pain with breathing, seizure, confusion, or trouble walking.

## 2023-06-15 NOTE — ED PROVIDER NOTE - CLINICAL SUMMARY MEDICAL DECISION MAKING FREE TEXT BOX
Pt with polysubstance abuse, presenting with nausea, vomiting and syncope. ekg noted to be abnormal and changed from 3/2023. no CP/SOB. +epigastric tttp. labs without signifcant abnormalites. trop x1 negativ.e reocmmend patient to stay for TTE and repeat trop. patient requesting to leave. patient has capacity. in police custody. patient AMAd

## 2023-06-15 NOTE — ED PROVIDER NOTE - OBJECTIVE STATEMENT
26yo M with heroin/alcohol abuse Presenting with nausea vomiting epigastric pain and syncopal episode at the court today.  Apparently was seen in the ED yesterday for maybe seizure-like activity.  Officers present with the patient do not know exactly what happened.  Patient states has been having some abdominal pain unable to tolerate p.o. worse with eating.  States he feels anxious because he has not used heroin in 3 days.  No chest pain no shortness of breath no cardiac history no family history of heart disease denies cigarette 28yo M with heroin/alcohol abuse Presenting with nausea vomiting epigastric pain and syncopal episode at the court today.  Apparently was seen in the ED yesterday for maybe seizure-like activity.  Officers present with the patient do not know exactly what happened.  Patient states has been having some abdominal pain unable to tolerate p.o. worse with eating.  States he feels anxious because he has not used heroin in 3 days.  No chest pain no shortness of breath no cardiac history no family history of heart disease denies cigarette

## 2023-06-15 NOTE — ED ADULT TRIAGE NOTE - CHIEF COMPLAINT QUOTE
Pt BIBA from [facility] where pt had syncopal episode.  Pt states he felt weak then stood up and "passed out."  Pt endorsing abdominal pain with "vomiting 20 times today."  No obvious injury noted to pt. Pt BIBA from [facility] where pt had syncopal episode.  Pt states he felt weak then stood up and "passed out."  Pt endorsing abdominal pain with "vomiting 20 times today."  No obvious injury noted to pt. Pt BIBA from [facility] where pt had syncopal episode.  Pt states he felt weak then stood up and "passed out."  Pt endorsing abdominal pain with "vomiting 20 times today."  No obvious injury noted to pt.

## 2023-06-15 NOTE — ED ADULT NURSE NOTE - NSFALLRISKINTERV_ED_ALL_ED
Communicate fall risk and risk factors to all staff, patient, and family/Provide visual cue: yellow wristband, yellow gown, etc/Reinforce activity limits and safety measures with patient and family/Call bell, personal items and telephone in reach/Instruct patient to call for assistance before getting out of bed/chair/stretcher/Non-slip footwear applied when patient is off stretcher/[place] to call system/Physically safe environment - no spills, clutter or unnecessary equipment/Purposeful Proactive Rounding/Room/bathroom lighting operational, light cord in reach Communicate fall risk and risk factors to all staff, patient, and family/Provide visual cue: yellow wristband, yellow gown, etc/Reinforce activity limits and safety measures with patient and family/Call bell, personal items and telephone in reach/Instruct patient to call for assistance before getting out of bed/chair/stretcher/Non-slip footwear applied when patient is off stretcher/[place] to call system/Physically safe environment - no spills, clutter or unnecessary equipment/Purposeful Proactive Rounding/Room/bathroom lighting operational, light cord in reach Communicate fall risk and risk factors to all staff, patient, and family/Provide visual cue: yellow wristband, yellow gown, etc/Reinforce activity limits and safety measures with patient and family/Call bell, personal items and telephone in reach/Instruct patient to call for assistance before getting out of bed/chair/stretcher/Non-slip footwear applied when patient is off stretcher/[place] to call system/Physically safe environment - no spills, clutter or unnecessary equipment/Purposeful Proactive Rounding/Room/bathroom lighting operational, light cord in reach

## 2023-06-15 NOTE — ED ADULT NURSE NOTE - OBJECTIVE STATEMENT
Patient is alert and oriented X4 from [facility] where pt had syncopal episode.  Patient states he felt weak then stood up and "passed out."  Patient endorsing abdominal pain with "vomiting 20 times today."  No obvious injury noted to pt. Patient is alert and oriented X4 from [facility] where pt had syncopal episode.  Patient states he felt weak then stood up and "passed out."  Patient endorsing abdominal pain with "vomiting 20 times today."  No obvious injury noted to pt. Patient is alert and oriented X4 from [facility] where pt had syncopal episode.  Patient states he felt weak then stood up and "passed out."  Patient endorsing abdominal pain with "vomiting 20 times today."  No obvious injury noted to pt.

## 2023-06-15 NOTE — ED PROVIDER NOTE - PHYSICAL EXAMINATION
Gen: NAD, AOx3  Head: NCAT  HEENT: EOMI, oral mucosa moist, normal conjunctiva, neck supple, poor dentition  Lung: CTAB, no respiratory distress  CV: rrr, no murmur, Normal perfusion  Abd: soft, +epigastric ttp, ND  MSK: No edema, no visible deformities  Neuro: No focal neurologic deficits  Skin: No rash   Psych: normal affect

## 2023-06-23 PROBLEM — Z09 HOSPITAL DISCHARGE FOLLOW-UP: Status: ACTIVE | Noted: 2023-01-01

## 2023-06-23 PROBLEM — R10.9 STOMACH PAIN: Status: RESOLVED | Noted: 2023-01-01 | Resolved: 2023-01-01

## 2023-06-23 PROBLEM — Z87.898 HISTORY OF SYNCOPE: Status: RESOLVED | Noted: 2023-01-01 | Resolved: 2023-01-01

## 2023-06-23 PROBLEM — Z83.3 FAMILY HISTORY OF DIABETES MELLITUS: Status: ACTIVE | Noted: 2023-01-01

## 2023-06-23 PROBLEM — Z87.891 FORMER SMOKER: Status: ACTIVE | Noted: 2023-01-01

## 2023-06-23 PROBLEM — R55 SYNCOPE AND COLLAPSE: Status: ACTIVE | Noted: 2023-01-01

## 2023-06-23 NOTE — PHYSICAL EXAM
[Normal] : moves all extremities, no focal deficits, normal speech [de-identified] : No carotid bruits auscultated bilaterally
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
## 2023-06-23 NOTE — DISCUSSION/SUMMARY
[FreeTextEntry1] : ECG and cardiac physical examination is within normal limits. \par \par Don't suspect underlying cardiac pathology.\par \par There is no known family history in first degree relatives of cardiovascular disease.\par \par No further cardiac testing at this time. [EKG obtained to assist in diagnosis and management of assessed problem(s)] : EKG obtained to assist in diagnosis and management of assessed problem(s)

## 2023-06-30 NOTE — ED PROVIDER NOTE - NSDCPRINTRESULTS_ED_ALL_ED
30-Jun-2023 22:20 Patient requests all Lab, Cardiology, and Radiology Results on their Discharge Instructions

## 2024-01-01 ENCOUNTER — RESULT REVIEW (OUTPATIENT)
Age: 29
End: 2024-01-01

## 2024-01-01 ENCOUNTER — EMERGENCY (EMERGENCY)
Facility: HOSPITAL | Age: 29
LOS: 1 days | Discharge: HOME | End: 2024-01-01
Attending: EMERGENCY MEDICINE
Payer: SELF-PAY

## 2024-01-01 ENCOUNTER — INPATIENT (INPATIENT)
Facility: HOSPITAL | Age: 29
LOS: 12 days | DRG: 948 | End: 2024-06-21
Attending: STUDENT IN AN ORGANIZED HEALTH CARE EDUCATION/TRAINING PROGRAM | Admitting: STUDENT IN AN ORGANIZED HEALTH CARE EDUCATION/TRAINING PROGRAM
Payer: COMMERCIAL

## 2024-01-01 VITALS
HEART RATE: 103 BPM | OXYGEN SATURATION: 99 % | SYSTOLIC BLOOD PRESSURE: 126 MMHG | HEIGHT: 70 IN | DIASTOLIC BLOOD PRESSURE: 92 MMHG | TEMPERATURE: 98 F | RESPIRATION RATE: 18 BRPM | WEIGHT: 134.92 LBS

## 2024-01-01 VITALS
HEART RATE: 86 BPM | OXYGEN SATURATION: 100 % | RESPIRATION RATE: 20 BRPM | SYSTOLIC BLOOD PRESSURE: 124 MMHG | DIASTOLIC BLOOD PRESSURE: 85 MMHG

## 2024-01-01 VITALS
OXYGEN SATURATION: 98 % | HEIGHT: 70 IN | TEMPERATURE: 98 F | HEART RATE: 77 BPM | WEIGHT: 160.06 LBS | RESPIRATION RATE: 20 BRPM | DIASTOLIC BLOOD PRESSURE: 78 MMHG | SYSTOLIC BLOOD PRESSURE: 120 MMHG

## 2024-01-01 VITALS
HEIGHT: 70 IN | RESPIRATION RATE: 16 BRPM | DIASTOLIC BLOOD PRESSURE: 82 MMHG | TEMPERATURE: 98 F | HEART RATE: 77 BPM | SYSTOLIC BLOOD PRESSURE: 117 MMHG | OXYGEN SATURATION: 99 %

## 2024-01-01 DIAGNOSIS — F19.10 OTHER PSYCHOACTIVE SUBSTANCE ABUSE, UNCOMPLICATED: ICD-10-CM

## 2024-01-01 DIAGNOSIS — M79.672 PAIN IN LEFT FOOT: ICD-10-CM

## 2024-01-01 DIAGNOSIS — Z98.890 OTHER SPECIFIED POSTPROCEDURAL STATES: Chronic | ICD-10-CM

## 2024-01-01 DIAGNOSIS — M79.671 PAIN IN RIGHT FOOT: ICD-10-CM

## 2024-01-01 DIAGNOSIS — Z90.49 ACQUIRED ABSENCE OF OTHER SPECIFIED PARTS OF DIGESTIVE TRACT: Chronic | ICD-10-CM

## 2024-01-01 DIAGNOSIS — S52.209A UNSPECIFIED FRACTURE OF SHAFT OF UNSPECIFIED ULNA, INITIAL ENCOUNTER FOR CLOSED FRACTURE: Chronic | ICD-10-CM

## 2024-01-01 DIAGNOSIS — T14.8XXA OTHER INJURY OF UNSPECIFIED BODY REGION, INITIAL ENCOUNTER: ICD-10-CM

## 2024-01-01 DIAGNOSIS — Z99.11 DEPENDENCE ON RESPIRATOR [VENTILATOR] STATUS: ICD-10-CM

## 2024-01-01 DIAGNOSIS — G93.49 OTHER ENCEPHALOPATHY: ICD-10-CM

## 2024-01-01 DIAGNOSIS — Z51.5 ENCOUNTER FOR PALLIATIVE CARE: ICD-10-CM

## 2024-01-01 DIAGNOSIS — R41.0 DISORIENTATION, UNSPECIFIED: ICD-10-CM

## 2024-01-01 DIAGNOSIS — R10.9 UNSPECIFIED ABDOMINAL PAIN: ICD-10-CM

## 2024-01-01 DIAGNOSIS — J13 PNEUMONIA DUE TO STREPTOCOCCUS PNEUMONIAE: ICD-10-CM
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
## 2024-01-01 LAB
-  CEFTRIAXONE (NON-MENINGITIDIS): SIGNIFICANT CHANGE UP
-  CLINDAMYCIN: SIGNIFICANT CHANGE UP
-  ERYTHROMYCIN: SIGNIFICANT CHANGE UP
-  LEVOFLOXACIN: SIGNIFICANT CHANGE UP
-  PENICILLIN (NON-MENINGITIDIS): SIGNIFICANT CHANGE UP
-  TETRACYCLINE: SIGNIFICANT CHANGE UP
-  TRIMETHOPRIM/SULFAMETHOXAZOLE: SIGNIFICANT CHANGE UP
-  VANCOMYCIN: SIGNIFICANT CHANGE UP
ALBUMIN SERPL ELPH-MCNC: 2.4 G/DL — LOW (ref 3.3–5.2)
ALBUMIN SERPL ELPH-MCNC: 2.5 G/DL — LOW (ref 3.3–5.2)
ALBUMIN SERPL ELPH-MCNC: 2.8 G/DL — LOW (ref 3.3–5.2)
ALBUMIN SERPL ELPH-MCNC: 2.9 G/DL — LOW (ref 3.3–5.2)
ALBUMIN SERPL ELPH-MCNC: 3 G/DL — LOW (ref 3.3–5.2)
ALBUMIN SERPL ELPH-MCNC: 3.1 G/DL — LOW (ref 3.3–5.2)
ALBUMIN SERPL ELPH-MCNC: 3.2 G/DL — LOW (ref 3.3–5.2)
ALBUMIN SERPL ELPH-MCNC: 3.2 G/DL — LOW (ref 3.3–5.2)
ALBUMIN SERPL ELPH-MCNC: 3.3 G/DL — SIGNIFICANT CHANGE UP (ref 3.3–5.2)
ALBUMIN SERPL ELPH-MCNC: 3.4 G/DL — SIGNIFICANT CHANGE UP (ref 3.3–5.2)
ALBUMIN SERPL ELPH-MCNC: 3.5 G/DL — SIGNIFICANT CHANGE UP (ref 3.3–5.2)
ALBUMIN SERPL ELPH-MCNC: 3.6 G/DL — SIGNIFICANT CHANGE UP (ref 3.3–5.2)
ALBUMIN SERPL ELPH-MCNC: 3.6 G/DL — SIGNIFICANT CHANGE UP (ref 3.3–5.2)
ALBUMIN SERPL ELPH-MCNC: 3.7 G/DL — SIGNIFICANT CHANGE UP (ref 3.3–5.2)
ALBUMIN SERPL ELPH-MCNC: 3.8 G/DL — SIGNIFICANT CHANGE UP (ref 3.3–5.2)
ALBUMIN SERPL ELPH-MCNC: 4 G/DL — SIGNIFICANT CHANGE UP (ref 3.3–5.2)
ALP SERPL-CCNC: 102 U/L — SIGNIFICANT CHANGE UP (ref 40–120)
ALP SERPL-CCNC: 103 U/L — SIGNIFICANT CHANGE UP (ref 40–120)
ALP SERPL-CCNC: 104 U/L — SIGNIFICANT CHANGE UP (ref 40–120)
ALP SERPL-CCNC: 107 U/L — SIGNIFICANT CHANGE UP (ref 40–120)
ALP SERPL-CCNC: 113 U/L — SIGNIFICANT CHANGE UP (ref 40–120)
ALP SERPL-CCNC: 114 U/L — SIGNIFICANT CHANGE UP (ref 40–120)
ALP SERPL-CCNC: 120 U/L — SIGNIFICANT CHANGE UP (ref 40–120)
ALP SERPL-CCNC: 123 U/L — HIGH (ref 40–120)
ALP SERPL-CCNC: 123 U/L — HIGH (ref 40–120)
ALP SERPL-CCNC: 125 U/L — HIGH (ref 40–120)
ALP SERPL-CCNC: 129 U/L — HIGH (ref 40–120)
ALP SERPL-CCNC: 131 U/L — HIGH (ref 40–120)
ALP SERPL-CCNC: 154 U/L — HIGH (ref 40–120)
ALP SERPL-CCNC: 84 U/L — SIGNIFICANT CHANGE UP (ref 40–120)
ALP SERPL-CCNC: 91 U/L — SIGNIFICANT CHANGE UP (ref 40–120)
ALP SERPL-CCNC: 98 U/L — SIGNIFICANT CHANGE UP (ref 40–120)
ALP SERPL-CCNC: 99 U/L — SIGNIFICANT CHANGE UP (ref 40–120)
ALT FLD-CCNC: 10 U/L — SIGNIFICANT CHANGE UP
ALT FLD-CCNC: 11 U/L — SIGNIFICANT CHANGE UP
ALT FLD-CCNC: 11 U/L — SIGNIFICANT CHANGE UP
ALT FLD-CCNC: 12 U/L — SIGNIFICANT CHANGE UP
ALT FLD-CCNC: 59 U/L — HIGH
ALT FLD-CCNC: 6 U/L — SIGNIFICANT CHANGE UP
ALT FLD-CCNC: 7 U/L — SIGNIFICANT CHANGE UP
ALT FLD-CCNC: 8 U/L — SIGNIFICANT CHANGE UP
ALT FLD-CCNC: 9 U/L — SIGNIFICANT CHANGE UP
ALT FLD-CCNC: 90 U/L — HIGH
AMMONIA BLD-MCNC: 174 UMOL/L — HIGH (ref 11–55)
AMMONIA BLD-MCNC: 43 UMOL/L — SIGNIFICANT CHANGE UP (ref 11–55)
AMMONIA BLD-MCNC: 47 UMOL/L — SIGNIFICANT CHANGE UP (ref 11–55)
AMPHET UR-MCNC: NEGATIVE — SIGNIFICANT CHANGE UP
ANION GAP SERPL CALC-SCNC: 10 MMOL/L — SIGNIFICANT CHANGE UP (ref 5–17)
ANION GAP SERPL CALC-SCNC: 11 MMOL/L — SIGNIFICANT CHANGE UP (ref 5–17)
ANION GAP SERPL CALC-SCNC: 12 MMOL/L — SIGNIFICANT CHANGE UP (ref 5–17)
ANION GAP SERPL CALC-SCNC: 13 MMOL/L — SIGNIFICANT CHANGE UP (ref 5–17)
ANION GAP SERPL CALC-SCNC: 14 MMOL/L — SIGNIFICANT CHANGE UP (ref 5–17)
ANION GAP SERPL CALC-SCNC: 15 MMOL/L — SIGNIFICANT CHANGE UP (ref 5–17)
ANION GAP SERPL CALC-SCNC: 15 MMOL/L — SIGNIFICANT CHANGE UP (ref 5–17)
ANION GAP SERPL CALC-SCNC: 17 MMOL/L — SIGNIFICANT CHANGE UP (ref 5–17)
ANION GAP SERPL CALC-SCNC: 18 MMOL/L — HIGH (ref 5–17)
ANION GAP SERPL CALC-SCNC: 21 MMOL/L — HIGH (ref 5–17)
ANION GAP SERPL CALC-SCNC: 6 MMOL/L — SIGNIFICANT CHANGE UP (ref 5–17)
ANION GAP SERPL CALC-SCNC: 7 MMOL/L — SIGNIFICANT CHANGE UP (ref 5–17)
ANION GAP SERPL CALC-SCNC: 8 MMOL/L — SIGNIFICANT CHANGE UP (ref 5–17)
ANION GAP SERPL CALC-SCNC: 9 MMOL/L — SIGNIFICANT CHANGE UP (ref 5–17)
APAP SERPL-MCNC: <3 UG/ML — LOW (ref 10–26)
APAP SERPL-MCNC: <3 UG/ML — LOW (ref 10–26)
APPEARANCE CSF: CLEAR — SIGNIFICANT CHANGE UP
APPEARANCE SPUN FLD: COLORLESS — SIGNIFICANT CHANGE UP
APPEARANCE UR: ABNORMAL
APPEARANCE UR: CLEAR — SIGNIFICANT CHANGE UP
AST SERPL-CCNC: 14 U/L — SIGNIFICANT CHANGE UP
AST SERPL-CCNC: 14 U/L — SIGNIFICANT CHANGE UP
AST SERPL-CCNC: 15 U/L — SIGNIFICANT CHANGE UP
AST SERPL-CCNC: 15 U/L — SIGNIFICANT CHANGE UP
AST SERPL-CCNC: 16 U/L — SIGNIFICANT CHANGE UP
AST SERPL-CCNC: 17 U/L — SIGNIFICANT CHANGE UP
AST SERPL-CCNC: 17 U/L — SIGNIFICANT CHANGE UP
AST SERPL-CCNC: 19 U/L — SIGNIFICANT CHANGE UP
AST SERPL-CCNC: 19 U/L — SIGNIFICANT CHANGE UP
AST SERPL-CCNC: 20 U/L — SIGNIFICANT CHANGE UP
AST SERPL-CCNC: 206 U/L — HIGH
AST SERPL-CCNC: 24 U/L — SIGNIFICANT CHANGE UP
AST SERPL-CCNC: 24 U/L — SIGNIFICANT CHANGE UP
AST SERPL-CCNC: 249 U/L — HIGH
AST SERPL-CCNC: 29 U/L — SIGNIFICANT CHANGE UP
AST SERPL-CCNC: 35 U/L — SIGNIFICANT CHANGE UP
AST SERPL-CCNC: 35 U/L — SIGNIFICANT CHANGE UP
AST SERPL-CCNC: 38 U/L — SIGNIFICANT CHANGE UP
AST SERPL-CCNC: 38 U/L — SIGNIFICANT CHANGE UP
B BURGDOR DNA SPEC QL NAA+PROBE: NEGATIVE — SIGNIFICANT CHANGE UP
BACTERIA # UR AUTO: ABNORMAL /HPF
BACTERIA # UR AUTO: NEGATIVE /HPF — SIGNIFICANT CHANGE UP
BACTERIA # UR AUTO: NEGATIVE /HPF — SIGNIFICANT CHANGE UP
BARBITURATES UR SCN-MCNC: POSITIVE
BASE EXCESS BLDA CALC-SCNC: 3.3 MMOL/L — HIGH (ref -2–3)
BASE EXCESS BLDA CALC-SCNC: 5 MMOL/L — HIGH (ref -2–3)
BASE EXCESS BLDA CALC-SCNC: 5.2 MMOL/L — HIGH (ref -2–3)
BASE EXCESS BLDA CALC-SCNC: 5.7 MMOL/L — HIGH (ref -2–3)
BASE EXCESS BLDA CALC-SCNC: 8.9 MMOL/L — HIGH (ref -2–3)
BASE EXCESS BLDV CALC-SCNC: -0.3 MMOL/L — SIGNIFICANT CHANGE UP (ref -2–3)
BASE EXCESS BLDV CALC-SCNC: -1.3 MMOL/L — SIGNIFICANT CHANGE UP (ref -2–3)
BASOPHILS # BLD AUTO: 0 K/UL — SIGNIFICANT CHANGE UP (ref 0–0.2)
BASOPHILS # BLD AUTO: 0.01 K/UL — SIGNIFICANT CHANGE UP (ref 0–0.2)
BASOPHILS # BLD AUTO: 0.02 K/UL — SIGNIFICANT CHANGE UP (ref 0–0.2)
BASOPHILS # BLD AUTO: 0.03 K/UL — SIGNIFICANT CHANGE UP (ref 0–0.2)
BASOPHILS # BLD AUTO: 0.05 K/UL — SIGNIFICANT CHANGE UP (ref 0–0.2)
BASOPHILS # BLD AUTO: 0.05 K/UL — SIGNIFICANT CHANGE UP (ref 0–0.2)
BASOPHILS # BLD AUTO: 0.07 K/UL — SIGNIFICANT CHANGE UP (ref 0–0.2)
BASOPHILS # BLD AUTO: 0.11 K/UL — SIGNIFICANT CHANGE UP (ref 0–0.2)
BASOPHILS NFR BLD AUTO: 0 % — SIGNIFICANT CHANGE UP (ref 0–2)
BASOPHILS NFR BLD AUTO: 0.1 % — SIGNIFICANT CHANGE UP (ref 0–2)
BASOPHILS NFR BLD AUTO: 0.2 % — SIGNIFICANT CHANGE UP (ref 0–2)
BASOPHILS NFR BLD AUTO: 0.3 % — SIGNIFICANT CHANGE UP (ref 0–2)
BASOPHILS NFR BLD AUTO: 0.3 % — SIGNIFICANT CHANGE UP (ref 0–2)
BASOPHILS NFR BLD AUTO: 0.5 % — SIGNIFICANT CHANGE UP (ref 0–2)
BENZODIAZ UR-MCNC: NEGATIVE — SIGNIFICANT CHANGE UP
BILIRUB DIRECT SERPL-MCNC: 0.1 MG/DL — SIGNIFICANT CHANGE UP (ref 0–0.3)
BILIRUB INDIRECT FLD-MCNC: 0.1 MG/DL — LOW (ref 0.2–1)
BILIRUB SERPL-MCNC: 0.2 MG/DL — LOW (ref 0.4–2)
BILIRUB SERPL-MCNC: 0.3 MG/DL — LOW (ref 0.4–2)
BILIRUB SERPL-MCNC: 0.5 MG/DL — SIGNIFICANT CHANGE UP (ref 0.4–2)
BILIRUB SERPL-MCNC: 0.6 MG/DL — SIGNIFICANT CHANGE UP (ref 0.4–2)
BILIRUB SERPL-MCNC: 0.6 MG/DL — SIGNIFICANT CHANGE UP (ref 0.4–2)
BILIRUB SERPL-MCNC: <0.2 MG/DL — LOW (ref 0.4–2)
BILIRUB UR-MCNC: NEGATIVE — SIGNIFICANT CHANGE UP
BLD GP AB SCN SERPL QL: SIGNIFICANT CHANGE UP
BLOOD GAS COMMENTS ARTERIAL: SIGNIFICANT CHANGE UP
BUN SERPL-MCNC: 10.1 MG/DL — SIGNIFICANT CHANGE UP (ref 8–20)
BUN SERPL-MCNC: 10.4 MG/DL — SIGNIFICANT CHANGE UP (ref 8–20)
BUN SERPL-MCNC: 10.9 MG/DL — SIGNIFICANT CHANGE UP (ref 8–20)
BUN SERPL-MCNC: 11 MG/DL — SIGNIFICANT CHANGE UP (ref 8–20)
BUN SERPL-MCNC: 11.4 MG/DL — SIGNIFICANT CHANGE UP (ref 8–20)
BUN SERPL-MCNC: 11.6 MG/DL — SIGNIFICANT CHANGE UP (ref 8–20)
BUN SERPL-MCNC: 11.8 MG/DL — SIGNIFICANT CHANGE UP (ref 8–20)
BUN SERPL-MCNC: 12 MG/DL — SIGNIFICANT CHANGE UP (ref 8–20)
BUN SERPL-MCNC: 12.3 MG/DL — SIGNIFICANT CHANGE UP (ref 8–20)
BUN SERPL-MCNC: 12.8 MG/DL — SIGNIFICANT CHANGE UP (ref 8–20)
BUN SERPL-MCNC: 13.2 MG/DL — SIGNIFICANT CHANGE UP (ref 8–20)
BUN SERPL-MCNC: 13.2 MG/DL — SIGNIFICANT CHANGE UP (ref 8–20)
BUN SERPL-MCNC: 13.7 MG/DL — SIGNIFICANT CHANGE UP (ref 8–20)
BUN SERPL-MCNC: 13.9 MG/DL — SIGNIFICANT CHANGE UP (ref 8–20)
BUN SERPL-MCNC: 14.1 MG/DL — SIGNIFICANT CHANGE UP (ref 8–20)
BUN SERPL-MCNC: 14.8 MG/DL — SIGNIFICANT CHANGE UP (ref 8–20)
BUN SERPL-MCNC: 15.1 MG/DL — SIGNIFICANT CHANGE UP (ref 8–20)
BUN SERPL-MCNC: 16.2 MG/DL — SIGNIFICANT CHANGE UP (ref 8–20)
BUN SERPL-MCNC: 16.3 MG/DL — SIGNIFICANT CHANGE UP (ref 8–20)
BUN SERPL-MCNC: 16.7 MG/DL — SIGNIFICANT CHANGE UP (ref 8–20)
BUN SERPL-MCNC: 17.1 MG/DL — SIGNIFICANT CHANGE UP (ref 8–20)
BUN SERPL-MCNC: 17.5 MG/DL — SIGNIFICANT CHANGE UP (ref 8–20)
BUN SERPL-MCNC: 18.1 MG/DL — SIGNIFICANT CHANGE UP (ref 8–20)
BUN SERPL-MCNC: 18.6 MG/DL — SIGNIFICANT CHANGE UP (ref 8–20)
BUN SERPL-MCNC: 19.3 MG/DL — SIGNIFICANT CHANGE UP (ref 8–20)
BUN SERPL-MCNC: 21.8 MG/DL — HIGH (ref 8–20)
BUN SERPL-MCNC: 22.3 MG/DL — HIGH (ref 8–20)
BUN SERPL-MCNC: 26.7 MG/DL — HIGH (ref 8–20)
BUN SERPL-MCNC: 29 MG/DL — HIGH (ref 8–20)
BUN SERPL-MCNC: 30.3 MG/DL — HIGH (ref 8–20)
BUN SERPL-MCNC: 30.7 MG/DL — HIGH (ref 8–20)
BUN SERPL-MCNC: 5 MG/DL — LOW (ref 8–20)
BUN SERPL-MCNC: 5.6 MG/DL — LOW (ref 8–20)
BUN SERPL-MCNC: 5.9 MG/DL — LOW (ref 8–20)
BUN SERPL-MCNC: 7.6 MG/DL — LOW (ref 8–20)
BUN SERPL-MCNC: 8.9 MG/DL — SIGNIFICANT CHANGE UP (ref 8–20)
BUN SERPL-MCNC: 9.2 MG/DL — SIGNIFICANT CHANGE UP (ref 8–20)
BUN SERPL-MCNC: 9.2 MG/DL — SIGNIFICANT CHANGE UP (ref 8–20)
BURR CELLS BLD QL SMEAR: PRESENT — SIGNIFICANT CHANGE UP
C NEOFORM RRNA SPEC NAA+PROBE-ACNC: SIGNIFICANT CHANGE UP
CA-I SERPL-SCNC: 1.16 MMOL/L — SIGNIFICANT CHANGE UP (ref 1.15–1.33)
CA-I SERPL-SCNC: 1.24 MMOL/L — SIGNIFICANT CHANGE UP (ref 1.15–1.33)
CALCIUM SERPL-MCNC: 6.8 MG/DL — LOW (ref 8.4–10.5)
CALCIUM SERPL-MCNC: 7.9 MG/DL — LOW (ref 8.4–10.5)
CALCIUM SERPL-MCNC: 8 MG/DL — LOW (ref 8.4–10.5)
CALCIUM SERPL-MCNC: 8.3 MG/DL — LOW (ref 8.4–10.5)
CALCIUM SERPL-MCNC: 8.4 MG/DL — SIGNIFICANT CHANGE UP (ref 8.4–10.5)
CALCIUM SERPL-MCNC: 8.4 MG/DL — SIGNIFICANT CHANGE UP (ref 8.4–10.5)
CALCIUM SERPL-MCNC: 8.5 MG/DL — SIGNIFICANT CHANGE UP (ref 8.4–10.5)
CALCIUM SERPL-MCNC: 8.6 MG/DL — SIGNIFICANT CHANGE UP (ref 8.4–10.5)
CALCIUM SERPL-MCNC: 8.7 MG/DL — SIGNIFICANT CHANGE UP (ref 8.4–10.5)
CALCIUM SERPL-MCNC: 8.8 MG/DL — SIGNIFICANT CHANGE UP (ref 8.4–10.5)
CALCIUM SERPL-MCNC: 8.9 MG/DL — SIGNIFICANT CHANGE UP (ref 8.4–10.5)
CALCIUM SERPL-MCNC: 9 MG/DL — SIGNIFICANT CHANGE UP (ref 8.4–10.5)
CALCIUM SERPL-MCNC: 9.1 MG/DL — SIGNIFICANT CHANGE UP (ref 8.4–10.5)
CALCIUM SERPL-MCNC: 9.2 MG/DL — SIGNIFICANT CHANGE UP (ref 8.4–10.5)
CALCIUM SERPL-MCNC: 9.2 MG/DL — SIGNIFICANT CHANGE UP (ref 8.4–10.5)
CALCIUM SERPL-MCNC: 9.3 MG/DL — SIGNIFICANT CHANGE UP (ref 8.4–10.5)
CALCIUM SERPL-MCNC: 9.4 MG/DL — SIGNIFICANT CHANGE UP (ref 8.4–10.5)
CALCIUM SERPL-MCNC: 9.4 MG/DL — SIGNIFICANT CHANGE UP (ref 8.4–10.5)
CALCIUM SERPL-MCNC: 9.6 MG/DL — SIGNIFICANT CHANGE UP (ref 8.4–10.5)
CALCIUM SERPL-MCNC: 9.7 MG/DL — SIGNIFICANT CHANGE UP (ref 8.4–10.5)
CAST: 21 /LPF — HIGH (ref 0–4)
CAST: 4 /LPF — SIGNIFICANT CHANGE UP (ref 0–4)
CHLORIDE BLDV-SCNC: 104 MMOL/L — SIGNIFICANT CHANGE UP (ref 96–108)
CHLORIDE BLDV-SCNC: 104 MMOL/L — SIGNIFICANT CHANGE UP (ref 96–108)
CHLORIDE SERPL-SCNC: 100 MMOL/L — SIGNIFICANT CHANGE UP (ref 96–108)
CHLORIDE SERPL-SCNC: 101 MMOL/L — SIGNIFICANT CHANGE UP (ref 96–108)
CHLORIDE SERPL-SCNC: 102 MMOL/L — SIGNIFICANT CHANGE UP (ref 96–108)
CHLORIDE SERPL-SCNC: 103 MMOL/L — SIGNIFICANT CHANGE UP (ref 96–108)
CHLORIDE SERPL-SCNC: 105 MMOL/L — SIGNIFICANT CHANGE UP (ref 96–108)
CHLORIDE SERPL-SCNC: 106 MMOL/L — SIGNIFICANT CHANGE UP (ref 96–108)
CHLORIDE SERPL-SCNC: 107 MMOL/L — SIGNIFICANT CHANGE UP (ref 96–108)
CHLORIDE SERPL-SCNC: 108 MMOL/L — SIGNIFICANT CHANGE UP (ref 96–108)
CHLORIDE SERPL-SCNC: 109 MMOL/L — HIGH (ref 96–108)
CHLORIDE SERPL-SCNC: 111 MMOL/L — HIGH (ref 96–108)
CHLORIDE SERPL-SCNC: 113 MMOL/L — HIGH (ref 96–108)
CHLORIDE SERPL-SCNC: 113 MMOL/L — HIGH (ref 96–108)
CHLORIDE SERPL-SCNC: 115 MMOL/L — HIGH (ref 96–108)
CHLORIDE SERPL-SCNC: 115 MMOL/L — HIGH (ref 96–108)
CHLORIDE SERPL-SCNC: 116 MMOL/L — HIGH (ref 96–108)
CHLORIDE SERPL-SCNC: 99 MMOL/L — SIGNIFICANT CHANGE UP (ref 96–108)
CHOLEST SERPL-MCNC: 113 MG/DL — SIGNIFICANT CHANGE UP
CHOLEST SERPL-MCNC: 120 MG/DL — SIGNIFICANT CHANGE UP
CHOLEST SERPL-MCNC: 128 MG/DL — SIGNIFICANT CHANGE UP
CHOLEST SERPL-MCNC: 98 MG/DL — SIGNIFICANT CHANGE UP
CK MB CFR SERPL CALC: 4.9 NG/ML — SIGNIFICANT CHANGE UP (ref 0–6.7)
CK MB CFR SERPL CALC: 5.5 NG/ML — SIGNIFICANT CHANGE UP (ref 0–6.7)
CK MB CFR SERPL CALC: 5.7 NG/ML — SIGNIFICANT CHANGE UP (ref 0–6.7)
CK SERPL-CCNC: 303 U/L — HIGH (ref 30–200)
CK SERPL-CCNC: 447 U/L — HIGH (ref 30–200)
CK SERPL-CCNC: 803 U/L — HIGH (ref 30–200)
CMV DNA CSF QL NAA+PROBE: SIGNIFICANT CHANGE UP
CO2 SERPL-SCNC: 19 MMOL/L — LOW (ref 22–29)
CO2 SERPL-SCNC: 21 MMOL/L — LOW (ref 22–29)
CO2 SERPL-SCNC: 23 MMOL/L — SIGNIFICANT CHANGE UP (ref 22–29)
CO2 SERPL-SCNC: 24 MMOL/L — SIGNIFICANT CHANGE UP (ref 22–29)
CO2 SERPL-SCNC: 25 MMOL/L — SIGNIFICANT CHANGE UP (ref 22–29)
CO2 SERPL-SCNC: 26 MMOL/L — SIGNIFICANT CHANGE UP (ref 22–29)
CO2 SERPL-SCNC: 27 MMOL/L — SIGNIFICANT CHANGE UP (ref 22–29)
CO2 SERPL-SCNC: 28 MMOL/L — SIGNIFICANT CHANGE UP (ref 22–29)
CO2 SERPL-SCNC: 29 MMOL/L — SIGNIFICANT CHANGE UP (ref 22–29)
CO2 SERPL-SCNC: 30 MMOL/L — HIGH (ref 22–29)
CO2 SERPL-SCNC: 31 MMOL/L — HIGH (ref 22–29)
CO2 SERPL-SCNC: 33 MMOL/L — HIGH (ref 22–29)
COCAINE METAB.OTHER UR-MCNC: POSITIVE
COLOR CSF: SIGNIFICANT CHANGE UP
COLOR SPEC: ABNORMAL
COLOR SPEC: YELLOW — SIGNIFICANT CHANGE UP
CREAT ?TM UR-MCNC: 8 MG/DL — SIGNIFICANT CHANGE UP
CREAT SERPL-MCNC: 0.22 MG/DL — LOW (ref 0.5–1.3)
CREAT SERPL-MCNC: 0.29 MG/DL — LOW (ref 0.5–1.3)
CREAT SERPL-MCNC: 0.3 MG/DL — LOW (ref 0.5–1.3)
CREAT SERPL-MCNC: 0.3 MG/DL — LOW (ref 0.5–1.3)
CREAT SERPL-MCNC: 0.31 MG/DL — LOW (ref 0.5–1.3)
CREAT SERPL-MCNC: 0.32 MG/DL — LOW (ref 0.5–1.3)
CREAT SERPL-MCNC: 0.33 MG/DL — LOW (ref 0.5–1.3)
CREAT SERPL-MCNC: 0.34 MG/DL — LOW (ref 0.5–1.3)
CREAT SERPL-MCNC: 0.34 MG/DL — LOW (ref 0.5–1.3)
CREAT SERPL-MCNC: 0.35 MG/DL — LOW (ref 0.5–1.3)
CREAT SERPL-MCNC: 0.36 MG/DL — LOW (ref 0.5–1.3)
CREAT SERPL-MCNC: 0.37 MG/DL — LOW (ref 0.5–1.3)
CREAT SERPL-MCNC: 0.38 MG/DL — LOW (ref 0.5–1.3)
CREAT SERPL-MCNC: 0.38 MG/DL — LOW (ref 0.5–1.3)
CREAT SERPL-MCNC: 0.39 MG/DL — LOW (ref 0.5–1.3)
CREAT SERPL-MCNC: 0.39 MG/DL — LOW (ref 0.5–1.3)
CREAT SERPL-MCNC: 0.43 MG/DL — LOW (ref 0.5–1.3)
CREAT SERPL-MCNC: 0.44 MG/DL — LOW (ref 0.5–1.3)
CREAT SERPL-MCNC: 0.47 MG/DL — LOW (ref 0.5–1.3)
CREAT SERPL-MCNC: 0.47 MG/DL — LOW (ref 0.5–1.3)
CREAT SERPL-MCNC: 0.48 MG/DL — LOW (ref 0.5–1.3)
CREAT SERPL-MCNC: 0.48 MG/DL — LOW (ref 0.5–1.3)
CREAT SERPL-MCNC: 0.5 MG/DL — SIGNIFICANT CHANGE UP (ref 0.5–1.3)
CREAT SERPL-MCNC: 0.51 MG/DL — SIGNIFICANT CHANGE UP (ref 0.5–1.3)
CREAT SERPL-MCNC: 0.55 MG/DL — SIGNIFICANT CHANGE UP (ref 0.5–1.3)
CREAT SERPL-MCNC: 0.57 MG/DL — SIGNIFICANT CHANGE UP (ref 0.5–1.3)
CREAT SERPL-MCNC: 0.6 MG/DL — SIGNIFICANT CHANGE UP (ref 0.5–1.3)
CREAT SERPL-MCNC: 0.6 MG/DL — SIGNIFICANT CHANGE UP (ref 0.5–1.3)
CREAT SERPL-MCNC: 0.63 MG/DL — SIGNIFICANT CHANGE UP (ref 0.5–1.3)
CREAT SERPL-MCNC: 0.65 MG/DL — SIGNIFICANT CHANGE UP (ref 0.5–1.3)
CREAT SERPL-MCNC: 0.68 MG/DL — SIGNIFICANT CHANGE UP (ref 0.5–1.3)
CREAT SERPL-MCNC: 0.82 MG/DL — SIGNIFICANT CHANGE UP (ref 0.5–1.3)
CREAT SERPL-MCNC: 0.87 MG/DL — SIGNIFICANT CHANGE UP (ref 0.5–1.3)
CRYPTOC AG CSF-ACNC: NEGATIVE — SIGNIFICANT CHANGE UP
CSF PCR RESULT: SIGNIFICANT CHANGE UP
CULTURE RESULTS: ABNORMAL
CULTURE RESULTS: NO GROWTH — SIGNIFICANT CHANGE UP
CULTURE RESULTS: SIGNIFICANT CHANGE UP
DACRYOCYTES BLD QL SMEAR: SLIGHT — SIGNIFICANT CHANGE UP
DIFF PNL FLD: ABNORMAL
DIFF PNL FLD: NEGATIVE — SIGNIFICANT CHANGE UP
E COLI K1 DNA CSF QL NAA+NON-PROBE: SIGNIFICANT CHANGE UP
EBV PCR: SIGNIFICANT CHANGE UP IU/ML
EGFR: 119 ML/MIN/1.73M2 — SIGNIFICANT CHANGE UP
EGFR: 121 ML/MIN/1.73M2 — SIGNIFICANT CHANGE UP
EGFR: 128 ML/MIN/1.73M2 — SIGNIFICANT CHANGE UP
EGFR: 130 ML/MIN/1.73M2 — SIGNIFICANT CHANGE UP
EGFR: 131 ML/MIN/1.73M2 — SIGNIFICANT CHANGE UP
EGFR: 133 ML/MIN/1.73M2 — SIGNIFICANT CHANGE UP
EGFR: 133 ML/MIN/1.73M2 — SIGNIFICANT CHANGE UP
EGFR: 135 ML/MIN/1.73M2 — SIGNIFICANT CHANGE UP
EGFR: 137 ML/MIN/1.73M2 — SIGNIFICANT CHANGE UP
EGFR: 140 ML/MIN/1.73M2 — SIGNIFICANT CHANGE UP
EGFR: 141 ML/MIN/1.73M2 — SIGNIFICANT CHANGE UP
EGFR: 142 ML/MIN/1.73M2 — SIGNIFICANT CHANGE UP
EGFR: 142 ML/MIN/1.73M2 — SIGNIFICANT CHANGE UP
EGFR: 143 ML/MIN/1.73M2 — SIGNIFICANT CHANGE UP
EGFR: 143 ML/MIN/1.73M2 — SIGNIFICANT CHANGE UP
EGFR: 146 ML/MIN/1.73M2 — SIGNIFICANT CHANGE UP
EGFR: 147 ML/MIN/1.73M2 — SIGNIFICANT CHANGE UP
EGFR: 152 ML/MIN/1.73M2 — SIGNIFICANT CHANGE UP
EGFR: 152 ML/MIN/1.73M2 — SIGNIFICANT CHANGE UP
EGFR: 153 ML/MIN/1.73M2 — SIGNIFICANT CHANGE UP
EGFR: 153 ML/MIN/1.73M2 — SIGNIFICANT CHANGE UP
EGFR: 154 ML/MIN/1.73M2 — SIGNIFICANT CHANGE UP
EGFR: 155 ML/MIN/1.73M2 — SIGNIFICANT CHANGE UP
EGFR: 157 ML/MIN/1.73M2 — SIGNIFICANT CHANGE UP
EGFR: 158 ML/MIN/1.73M2 — SIGNIFICANT CHANGE UP
EGFR: 158 ML/MIN/1.73M2 — SIGNIFICANT CHANGE UP
EGFR: 160 ML/MIN/1.73M2 — SIGNIFICANT CHANGE UP
EGFR: 161 ML/MIN/1.73M2 — SIGNIFICANT CHANGE UP
EGFR: 163 ML/MIN/1.73M2 — SIGNIFICANT CHANGE UP
EGFR: 164 ML/MIN/1.73M2 — SIGNIFICANT CHANGE UP
EGFR: 164 ML/MIN/1.73M2 — SIGNIFICANT CHANGE UP
EGFR: 166 ML/MIN/1.73M2 — SIGNIFICANT CHANGE UP
EGFR: 180 ML/MIN/1.73M2 — SIGNIFICANT CHANGE UP
ELLIPTOCYTES BLD QL SMEAR: SLIGHT — SIGNIFICANT CHANGE UP
EOSINOPHIL # BLD AUTO: 0 K/UL — SIGNIFICANT CHANGE UP (ref 0–0.5)
EOSINOPHIL # BLD AUTO: 0.01 K/UL — SIGNIFICANT CHANGE UP (ref 0–0.5)
EOSINOPHIL # BLD AUTO: 0.04 K/UL — SIGNIFICANT CHANGE UP (ref 0–0.5)
EOSINOPHIL # BLD AUTO: 0.11 K/UL — SIGNIFICANT CHANGE UP (ref 0–0.5)
EOSINOPHIL # BLD AUTO: 0.13 K/UL — SIGNIFICANT CHANGE UP (ref 0–0.5)
EOSINOPHIL # BLD AUTO: 0.22 K/UL — SIGNIFICANT CHANGE UP (ref 0–0.5)
EOSINOPHIL NFR BLD AUTO: 0 % — SIGNIFICANT CHANGE UP (ref 0–6)
EOSINOPHIL NFR BLD AUTO: 0.1 % — SIGNIFICANT CHANGE UP (ref 0–6)
EOSINOPHIL NFR BLD AUTO: 0.1 % — SIGNIFICANT CHANGE UP (ref 0–6)
EOSINOPHIL NFR BLD AUTO: 0.4 % — SIGNIFICANT CHANGE UP (ref 0–6)
EOSINOPHIL NFR BLD AUTO: 0.7 % — SIGNIFICANT CHANGE UP (ref 0–6)
EOSINOPHIL NFR BLD AUTO: 0.9 % — SIGNIFICANT CHANGE UP (ref 0–6)
EOSINOPHIL NFR BLD AUTO: 0.9 % — SIGNIFICANT CHANGE UP (ref 0–6)
ESCHERICHIA COLI K1: SIGNIFICANT CHANGE UP
ETHANOL SERPL-MCNC: <10 MG/DL — SIGNIFICANT CHANGE UP (ref 0–9)
ETHANOL SERPL-MCNC: <10 MG/DL — SIGNIFICANT CHANGE UP (ref 0–9)
EV RNA CSF QL NAA+PROBE: SIGNIFICANT CHANGE UP
FENTANYL UR QL SCN: POSITIVE
GAS PNL BLDA: SIGNIFICANT CHANGE UP
GAS PNL BLDV: 135 MMOL/L — LOW (ref 136–145)
GAS PNL BLDV: 137 MMOL/L — SIGNIFICANT CHANGE UP (ref 136–145)
GAS PNL BLDV: SIGNIFICANT CHANGE UP
GLUCOSE BLDC GLUCOMTR-MCNC: 109 MG/DL — HIGH (ref 70–99)
GLUCOSE BLDC GLUCOMTR-MCNC: 133 MG/DL — HIGH (ref 70–99)
GLUCOSE BLDC GLUCOMTR-MCNC: 154 MG/DL — HIGH (ref 70–99)
GLUCOSE BLDV-MCNC: 104 MG/DL — HIGH (ref 70–99)
GLUCOSE BLDV-MCNC: 76 MG/DL — SIGNIFICANT CHANGE UP (ref 70–99)
GLUCOSE CSF-MCNC: 85 MG/DLG/24H — HIGH (ref 40–70)
GLUCOSE SERPL-MCNC: 101 MG/DL — HIGH (ref 70–99)
GLUCOSE SERPL-MCNC: 105 MG/DL — HIGH (ref 70–99)
GLUCOSE SERPL-MCNC: 107 MG/DL — HIGH (ref 70–99)
GLUCOSE SERPL-MCNC: 108 MG/DL — HIGH (ref 70–99)
GLUCOSE SERPL-MCNC: 112 MG/DL — HIGH (ref 70–99)
GLUCOSE SERPL-MCNC: 117 MG/DL — HIGH (ref 70–99)
GLUCOSE SERPL-MCNC: 118 MG/DL — HIGH (ref 70–99)
GLUCOSE SERPL-MCNC: 120 MG/DL — HIGH (ref 70–99)
GLUCOSE SERPL-MCNC: 125 MG/DL — HIGH (ref 70–99)
GLUCOSE SERPL-MCNC: 126 MG/DL — HIGH (ref 70–99)
GLUCOSE SERPL-MCNC: 128 MG/DL — HIGH (ref 70–99)
GLUCOSE SERPL-MCNC: 129 MG/DL — HIGH (ref 70–99)
GLUCOSE SERPL-MCNC: 130 MG/DL — HIGH (ref 70–99)
GLUCOSE SERPL-MCNC: 130 MG/DL — HIGH (ref 70–99)
GLUCOSE SERPL-MCNC: 132 MG/DL — HIGH (ref 70–99)
GLUCOSE SERPL-MCNC: 133 MG/DL — HIGH (ref 70–99)
GLUCOSE SERPL-MCNC: 137 MG/DL — HIGH (ref 70–99)
GLUCOSE SERPL-MCNC: 138 MG/DL — HIGH (ref 70–99)
GLUCOSE SERPL-MCNC: 142 MG/DL — HIGH (ref 70–99)
GLUCOSE SERPL-MCNC: 143 MG/DL — HIGH (ref 70–99)
GLUCOSE SERPL-MCNC: 144 MG/DL — HIGH (ref 70–99)
GLUCOSE SERPL-MCNC: 146 MG/DL — HIGH (ref 70–99)
GLUCOSE SERPL-MCNC: 149 MG/DL — HIGH (ref 70–99)
GLUCOSE SERPL-MCNC: 157 MG/DL — HIGH (ref 70–99)
GLUCOSE SERPL-MCNC: 158 MG/DL — HIGH (ref 70–99)
GLUCOSE SERPL-MCNC: 164 MG/DL — HIGH (ref 70–99)
GLUCOSE SERPL-MCNC: 169 MG/DL — HIGH (ref 70–99)
GLUCOSE SERPL-MCNC: 187 MG/DL — HIGH (ref 70–99)
GLUCOSE SERPL-MCNC: 189 MG/DL — HIGH (ref 70–99)
GLUCOSE SERPL-MCNC: 292 MG/DL — HIGH (ref 70–99)
GLUCOSE SERPL-MCNC: 68 MG/DL — LOW (ref 70–99)
GLUCOSE SERPL-MCNC: 82 MG/DL — SIGNIFICANT CHANGE UP (ref 70–99)
GLUCOSE SERPL-MCNC: 89 MG/DL — SIGNIFICANT CHANGE UP (ref 70–99)
GLUCOSE SERPL-MCNC: 93 MG/DL — SIGNIFICANT CHANGE UP (ref 70–99)
GLUCOSE SERPL-MCNC: 97 MG/DL — SIGNIFICANT CHANGE UP (ref 70–99)
GLUCOSE SERPL-MCNC: 99 MG/DL — SIGNIFICANT CHANGE UP (ref 70–99)
GLUCOSE UR QL: NEGATIVE MG/DL — SIGNIFICANT CHANGE UP
GP B STREP DNA SPEC QL NAA+PROBE: SIGNIFICANT CHANGE UP
GRAM STN FLD: ABNORMAL
GRAM STN FLD: SIGNIFICANT CHANGE UP
H CAPSUL AG CSF IA-MCNC: SIGNIFICANT CHANGE UP
HAEM INFLU DNA SPEC QL NAA+PROBE: SIGNIFICANT CHANGE UP
HAV IGM SER-ACNC: SIGNIFICANT CHANGE UP
HBV CORE IGM SER-ACNC: SIGNIFICANT CHANGE UP
HBV SURFACE AG SER-ACNC: SIGNIFICANT CHANGE UP
HCO3 BLDA-SCNC: 28 MMOL/L — SIGNIFICANT CHANGE UP (ref 21–28)
HCO3 BLDA-SCNC: 30 MMOL/L — HIGH (ref 21–28)
HCO3 BLDA-SCNC: 31 MMOL/L — HIGH (ref 21–28)
HCO3 BLDA-SCNC: 32 MMOL/L — HIGH (ref 21–28)
HCO3 BLDA-SCNC: 34 MMOL/L — HIGH (ref 21–28)
HCO3 BLDV-SCNC: 25 MMOL/L — SIGNIFICANT CHANGE UP (ref 22–29)
HCO3 BLDV-SCNC: 25 MMOL/L — SIGNIFICANT CHANGE UP (ref 22–29)
HCT VFR BLD CALC: 33.3 % — LOW (ref 39–50)
HCT VFR BLD CALC: 34 % — LOW (ref 39–50)
HCT VFR BLD CALC: 34.2 % — LOW (ref 39–50)
HCT VFR BLD CALC: 35.7 % — LOW (ref 39–50)
HCT VFR BLD CALC: 36.5 % — LOW (ref 39–50)
HCT VFR BLD CALC: 37.6 % — LOW (ref 39–50)
HCT VFR BLD CALC: 39.9 % — SIGNIFICANT CHANGE UP (ref 39–50)
HCT VFR BLD CALC: 39.9 % — SIGNIFICANT CHANGE UP (ref 39–50)
HCT VFR BLD CALC: 40 % — SIGNIFICANT CHANGE UP (ref 39–50)
HCT VFR BLD CALC: 40.2 % — SIGNIFICANT CHANGE UP (ref 39–50)
HCT VFR BLD CALC: 40.4 % — SIGNIFICANT CHANGE UP (ref 39–50)
HCT VFR BLD CALC: 40.8 % — SIGNIFICANT CHANGE UP (ref 39–50)
HCT VFR BLD CALC: 41.4 % — SIGNIFICANT CHANGE UP (ref 39–50)
HCT VFR BLD CALC: 41.5 % — SIGNIFICANT CHANGE UP (ref 39–50)
HCT VFR BLD CALC: 41.6 % — SIGNIFICANT CHANGE UP (ref 39–50)
HCT VFR BLD CALC: 41.7 % — SIGNIFICANT CHANGE UP (ref 39–50)
HCT VFR BLD CALC: 41.8 % — SIGNIFICANT CHANGE UP (ref 39–50)
HCT VFR BLD CALC: 43.2 % — SIGNIFICANT CHANGE UP (ref 39–50)
HCT VFR BLD CALC: 43.5 % — SIGNIFICANT CHANGE UP (ref 39–50)
HCT VFR BLD CALC: 43.6 % — SIGNIFICANT CHANGE UP (ref 39–50)
HCT VFR BLD CALC: 44.2 % — SIGNIFICANT CHANGE UP (ref 39–50)
HCT VFR BLD CALC: 45.2 % — SIGNIFICANT CHANGE UP (ref 39–50)
HCT VFR BLD CALC: 45.5 % — SIGNIFICANT CHANGE UP (ref 39–50)
HCT VFR BLD CALC: 46 % — SIGNIFICANT CHANGE UP (ref 39–50)
HCT VFR BLD CALC: 47.9 % — SIGNIFICANT CHANGE UP (ref 39–50)
HCT VFR BLDA CALC: 43 % — SIGNIFICANT CHANGE UP
HCT VFR BLDA CALC: 45 % — SIGNIFICANT CHANGE UP
HCV AB S/CO SERPL IA: 0.11 S/CO — SIGNIFICANT CHANGE UP (ref 0–0.99)
HCV AB SERPL-IMP: SIGNIFICANT CHANGE UP
HDLC SERPL-MCNC: 27 MG/DL — LOW
HDLC SERPL-MCNC: 28 MG/DL — LOW
HDLC SERPL-MCNC: 62 MG/DL — SIGNIFICANT CHANGE UP
HDLC SERPL-MCNC: 77 MG/DL — SIGNIFICANT CHANGE UP
HGB BLD CALC-MCNC: 14.3 G/DL — SIGNIFICANT CHANGE UP (ref 12.6–17.4)
HGB BLD CALC-MCNC: 15.1 G/DL — SIGNIFICANT CHANGE UP (ref 12.6–17.4)
HGB BLD-MCNC: 11.2 G/DL — LOW (ref 13–17)
HGB BLD-MCNC: 11.4 G/DL — LOW (ref 13–17)
HGB BLD-MCNC: 11.6 G/DL — LOW (ref 13–17)
HGB BLD-MCNC: 12.1 G/DL — LOW (ref 13–17)
HGB BLD-MCNC: 12.3 G/DL — LOW (ref 13–17)
HGB BLD-MCNC: 12.6 G/DL — LOW (ref 13–17)
HGB BLD-MCNC: 13.4 G/DL — SIGNIFICANT CHANGE UP (ref 13–17)
HGB BLD-MCNC: 13.6 G/DL — SIGNIFICANT CHANGE UP (ref 13–17)
HGB BLD-MCNC: 13.8 G/DL — SIGNIFICANT CHANGE UP (ref 13–17)
HGB BLD-MCNC: 13.8 G/DL — SIGNIFICANT CHANGE UP (ref 13–17)
HGB BLD-MCNC: 13.9 G/DL — SIGNIFICANT CHANGE UP (ref 13–17)
HGB BLD-MCNC: 13.9 G/DL — SIGNIFICANT CHANGE UP (ref 13–17)
HGB BLD-MCNC: 14 G/DL — SIGNIFICANT CHANGE UP (ref 13–17)
HGB BLD-MCNC: 14 G/DL — SIGNIFICANT CHANGE UP (ref 13–17)
HGB BLD-MCNC: 14.1 G/DL — SIGNIFICANT CHANGE UP (ref 13–17)
HGB BLD-MCNC: 14.2 G/DL — SIGNIFICANT CHANGE UP (ref 13–17)
HGB BLD-MCNC: 14.5 G/DL — SIGNIFICANT CHANGE UP (ref 13–17)
HGB BLD-MCNC: 14.7 G/DL — SIGNIFICANT CHANGE UP (ref 13–17)
HGB BLD-MCNC: 14.9 G/DL — SIGNIFICANT CHANGE UP (ref 13–17)
HGB BLD-MCNC: 15 G/DL — SIGNIFICANT CHANGE UP (ref 13–17)
HGB BLD-MCNC: 15.2 G/DL — SIGNIFICANT CHANGE UP (ref 13–17)
HGB BLD-MCNC: 15.5 G/DL — SIGNIFICANT CHANGE UP (ref 13–17)
HGB BLD-MCNC: 16.1 G/DL — SIGNIFICANT CHANGE UP (ref 13–17)
HHV6 DNA CSF QL NAA+PROBE: SIGNIFICANT CHANGE UP
HIV 1 & 2 AB SERPL IA.RAPID: SIGNIFICANT CHANGE UP
HIV 2 PROVIRAL DNA SERPL QL NAA+PROBE: SIGNIFICANT CHANGE UP
HOROWITZ INDEX BLDA+IHG-RTO: 40 — SIGNIFICANT CHANGE UP
HOROWITZ INDEX BLDA+IHG-RTO: 50 — SIGNIFICANT CHANGE UP
HOROWITZ INDEX BLDA+IHG-RTO: 80 — SIGNIFICANT CHANGE UP
HOROWITZ INDEX BLDA+IHG-RTO: SIGNIFICANT CHANGE UP
HOROWITZ INDEX BLDA+IHG-RTO: SIGNIFICANT CHANGE UP
HSV1 DNA CSF QL NAA+PROBE: SIGNIFICANT CHANGE UP
HSV2 DNA CSF QL NAA+PROBE: SIGNIFICANT CHANGE UP
IMM GRANULOCYTES NFR BLD AUTO: 0.2 % — SIGNIFICANT CHANGE UP (ref 0–0.9)
IMM GRANULOCYTES NFR BLD AUTO: 0.6 % — SIGNIFICANT CHANGE UP (ref 0–0.9)
IMM GRANULOCYTES NFR BLD AUTO: 0.7 % — SIGNIFICANT CHANGE UP (ref 0–0.9)
IMM GRANULOCYTES NFR BLD AUTO: 0.8 % — SIGNIFICANT CHANGE UP (ref 0–0.9)
IMM GRANULOCYTES NFR BLD AUTO: 0.9 % — SIGNIFICANT CHANGE UP (ref 0–0.9)
IMM GRANULOCYTES NFR BLD AUTO: 0.9 % — SIGNIFICANT CHANGE UP (ref 0–0.9)
IMM GRANULOCYTES NFR BLD AUTO: 1.2 % — HIGH (ref 0–0.9)
IMM GRANULOCYTES NFR BLD AUTO: 1.3 % — HIGH (ref 0–0.9)
IMM GRANULOCYTES NFR BLD AUTO: 1.3 % — HIGH (ref 0–0.9)
IMM GRANULOCYTES NFR BLD AUTO: 1.5 % — HIGH (ref 0–0.9)
JCPYV DNA # CSF NAA+PROBE: SIGNIFICANT CHANGE UP COPIES/ML
KETONES UR-MCNC: 15 MG/DL
KETONES UR-MCNC: NEGATIVE MG/DL — SIGNIFICANT CHANGE UP
L MONOCYTOG DNA SPEC QL NAA+PROBE: SIGNIFICANT CHANGE UP
LACTATE BLDV-MCNC: 1.6 MMOL/L — SIGNIFICANT CHANGE UP (ref 0.5–2)
LACTATE BLDV-MCNC: 4.6 MMOL/L — CRITICAL HIGH (ref 0.5–2)
LACTATE SERPL-SCNC: 1.1 MMOL/L — SIGNIFICANT CHANGE UP (ref 0.5–2)
LACTATE SERPL-SCNC: 1.2 MMOL/L — SIGNIFICANT CHANGE UP (ref 0.5–2)
LACTATE SERPL-SCNC: 2.3 MMOL/L — HIGH (ref 0.5–2)
LDH CSF L TO P-CCNC: 20 U/L — SIGNIFICANT CHANGE UP
LDH FLD-CCNC: 20 U/L — SIGNIFICANT CHANGE UP
LEGIONELLA AG UR QL: NEGATIVE — SIGNIFICANT CHANGE UP
LEUKOCYTE ESTERASE UR-ACNC: NEGATIVE — SIGNIFICANT CHANGE UP
LIPID PNL WITH DIRECT LDL SERPL: 24 MG/DL — SIGNIFICANT CHANGE UP
LIPID PNL WITH DIRECT LDL SERPL: 30 MG/DL — SIGNIFICANT CHANGE UP
LIPID PNL WITH DIRECT LDL SERPL: 49 MG/DL — SIGNIFICANT CHANGE UP
LIPID PNL WITH DIRECT LDL SERPL: 64 MG/DL — SIGNIFICANT CHANGE UP
LYME IGG LINE BLOT INTERP.: NEGATIVE — SIGNIFICANT CHANGE UP
LYME IGM LINE BLOT INTERP.: NEGATIVE — SIGNIFICANT CHANGE UP
LYMPHOCYTES # BLD AUTO: 0.43 K/UL — LOW (ref 1–3.3)
LYMPHOCYTES # BLD AUTO: 0.48 K/UL — LOW (ref 1–3.3)
LYMPHOCYTES # BLD AUTO: 0.74 K/UL — LOW (ref 1–3.3)
LYMPHOCYTES # BLD AUTO: 0.8 K/UL — LOW (ref 1–3.3)
LYMPHOCYTES # BLD AUTO: 0.83 K/UL — LOW (ref 1–3.3)
LYMPHOCYTES # BLD AUTO: 0.84 K/UL — LOW (ref 1–3.3)
LYMPHOCYTES # BLD AUTO: 1.34 K/UL — SIGNIFICANT CHANGE UP (ref 1–3.3)
LYMPHOCYTES # BLD AUTO: 1.82 K/UL — SIGNIFICANT CHANGE UP (ref 1–3.3)
LYMPHOCYTES # BLD AUTO: 1.86 K/UL — SIGNIFICANT CHANGE UP (ref 1–3.3)
LYMPHOCYTES # BLD AUTO: 1.92 K/UL — SIGNIFICANT CHANGE UP (ref 1–3.3)
LYMPHOCYTES # BLD AUTO: 10.5 % — LOW (ref 13–44)
LYMPHOCYTES # BLD AUTO: 10.9 % — LOW (ref 13–44)
LYMPHOCYTES # BLD AUTO: 13.9 % — SIGNIFICANT CHANGE UP (ref 13–44)
LYMPHOCYTES # BLD AUTO: 15.8 % — SIGNIFICANT CHANGE UP (ref 13–44)
LYMPHOCYTES # BLD AUTO: 18.2 % — SIGNIFICANT CHANGE UP (ref 13–44)
LYMPHOCYTES # BLD AUTO: 19.1 % — SIGNIFICANT CHANGE UP (ref 13–44)
LYMPHOCYTES # BLD AUTO: 2 % — LOW (ref 13–44)
LYMPHOCYTES # BLD AUTO: 2.22 K/UL — SIGNIFICANT CHANGE UP (ref 1–3.3)
LYMPHOCYTES # BLD AUTO: 2.36 K/UL — SIGNIFICANT CHANGE UP (ref 1–3.3)
LYMPHOCYTES # BLD AUTO: 2.58 K/UL — SIGNIFICANT CHANGE UP (ref 1–3.3)
LYMPHOCYTES # BLD AUTO: 2.64 K/UL — SIGNIFICANT CHANGE UP (ref 1–3.3)
LYMPHOCYTES # BLD AUTO: 2.68 K/UL — SIGNIFICANT CHANGE UP (ref 1–3.3)
LYMPHOCYTES # BLD AUTO: 24.6 % — SIGNIFICANT CHANGE UP (ref 13–44)
LYMPHOCYTES # BLD AUTO: 3.4 % — LOW (ref 13–44)
LYMPHOCYTES # BLD AUTO: 3.6 % — LOW (ref 13–44)
LYMPHOCYTES # BLD AUTO: 4.7 % — LOW (ref 13–44)
LYMPHOCYTES # BLD AUTO: 5.2 % — LOW (ref 13–44)
LYMPHOCYTES # BLD AUTO: 5.3 % — LOW (ref 13–44)
LYMPHOCYTES # BLD AUTO: 5.7 % — LOW (ref 13–44)
LYMPHOCYTES # BLD AUTO: 6.1 % — LOW (ref 13–44)
MACROCYTES BLD QL: SLIGHT — SIGNIFICANT CHANGE UP
MAGNESIUM SERPL-MCNC: 1.5 MG/DL — LOW (ref 1.6–2.6)
MAGNESIUM SERPL-MCNC: 1.6 MG/DL — SIGNIFICANT CHANGE UP (ref 1.6–2.6)
MAGNESIUM SERPL-MCNC: 1.7 MG/DL — SIGNIFICANT CHANGE UP (ref 1.6–2.6)
MAGNESIUM SERPL-MCNC: 1.7 MG/DL — SIGNIFICANT CHANGE UP (ref 1.6–2.6)
MAGNESIUM SERPL-MCNC: 1.8 MG/DL — SIGNIFICANT CHANGE UP (ref 1.6–2.6)
MAGNESIUM SERPL-MCNC: 1.8 MG/DL — SIGNIFICANT CHANGE UP (ref 1.8–2.6)
MAGNESIUM SERPL-MCNC: 1.9 MG/DL — SIGNIFICANT CHANGE UP (ref 1.6–2.6)
MAGNESIUM SERPL-MCNC: 2 MG/DL — SIGNIFICANT CHANGE UP (ref 1.6–2.6)
MAGNESIUM SERPL-MCNC: 2 MG/DL — SIGNIFICANT CHANGE UP (ref 1.8–2.6)
MAGNESIUM SERPL-MCNC: 2.1 MG/DL — SIGNIFICANT CHANGE UP (ref 1.6–2.6)
MAGNESIUM SERPL-MCNC: 2.1 MG/DL — SIGNIFICANT CHANGE UP (ref 1.6–2.6)
MAGNESIUM SERPL-MCNC: 2.3 MG/DL — SIGNIFICANT CHANGE UP (ref 1.6–2.6)
MAGNESIUM SERPL-MCNC: 2.3 MG/DL — SIGNIFICANT CHANGE UP (ref 1.8–2.6)
MANUAL SMEAR VERIFICATION: SIGNIFICANT CHANGE UP
MANUAL SMEAR VERIFICATION: SIGNIFICANT CHANGE UP
MCHC RBC-ENTMCNC: 30.6 PG — SIGNIFICANT CHANGE UP (ref 27–34)
MCHC RBC-ENTMCNC: 30.7 PG — SIGNIFICANT CHANGE UP (ref 27–34)
MCHC RBC-ENTMCNC: 30.8 PG — SIGNIFICANT CHANGE UP (ref 27–34)
MCHC RBC-ENTMCNC: 31 PG — SIGNIFICANT CHANGE UP (ref 27–34)
MCHC RBC-ENTMCNC: 31 PG — SIGNIFICANT CHANGE UP (ref 27–34)
MCHC RBC-ENTMCNC: 31.1 PG — SIGNIFICANT CHANGE UP (ref 27–34)
MCHC RBC-ENTMCNC: 31.2 PG — SIGNIFICANT CHANGE UP (ref 27–34)
MCHC RBC-ENTMCNC: 31.3 PG — SIGNIFICANT CHANGE UP (ref 27–34)
MCHC RBC-ENTMCNC: 31.4 PG — SIGNIFICANT CHANGE UP (ref 27–34)
MCHC RBC-ENTMCNC: 31.5 PG — SIGNIFICANT CHANGE UP (ref 27–34)
MCHC RBC-ENTMCNC: 31.5 PG — SIGNIFICANT CHANGE UP (ref 27–34)
MCHC RBC-ENTMCNC: 31.6 PG — SIGNIFICANT CHANGE UP (ref 27–34)
MCHC RBC-ENTMCNC: 31.8 PG — SIGNIFICANT CHANGE UP (ref 27–34)
MCHC RBC-ENTMCNC: 32.3 GM/DL — SIGNIFICANT CHANGE UP (ref 32–36)
MCHC RBC-ENTMCNC: 32.4 GM/DL — SIGNIFICANT CHANGE UP (ref 32–36)
MCHC RBC-ENTMCNC: 32.6 GM/DL — SIGNIFICANT CHANGE UP (ref 32–36)
MCHC RBC-ENTMCNC: 33 GM/DL — SIGNIFICANT CHANGE UP (ref 32–36)
MCHC RBC-ENTMCNC: 33.3 GM/DL — SIGNIFICANT CHANGE UP (ref 32–36)
MCHC RBC-ENTMCNC: 33.5 GM/DL — SIGNIFICANT CHANGE UP (ref 32–36)
MCHC RBC-ENTMCNC: 33.6 GM/DL — SIGNIFICANT CHANGE UP (ref 32–36)
MCHC RBC-ENTMCNC: 33.7 GM/DL — SIGNIFICANT CHANGE UP (ref 32–36)
MCHC RBC-ENTMCNC: 33.8 GM/DL — SIGNIFICANT CHANGE UP (ref 32–36)
MCHC RBC-ENTMCNC: 33.8 GM/DL — SIGNIFICANT CHANGE UP (ref 32–36)
MCHC RBC-ENTMCNC: 33.9 GM/DL — SIGNIFICANT CHANGE UP (ref 32–36)
MCHC RBC-ENTMCNC: 33.9 GM/DL — SIGNIFICANT CHANGE UP (ref 32–36)
MCHC RBC-ENTMCNC: 34 GM/DL — SIGNIFICANT CHANGE UP (ref 32–36)
MCHC RBC-ENTMCNC: 34.1 GM/DL — SIGNIFICANT CHANGE UP (ref 32–36)
MCHC RBC-ENTMCNC: 34.2 GM/DL — SIGNIFICANT CHANGE UP (ref 32–36)
MCHC RBC-ENTMCNC: 34.5 GM/DL — SIGNIFICANT CHANGE UP (ref 32–36)
MCHC RBC-ENTMCNC: 34.6 GM/DL — SIGNIFICANT CHANGE UP (ref 32–36)
MCHC RBC-ENTMCNC: 34.8 GM/DL — SIGNIFICANT CHANGE UP (ref 32–36)
MCHC RBC-ENTMCNC: 34.8 GM/DL — SIGNIFICANT CHANGE UP (ref 32–36)
MCHC RBC-ENTMCNC: 34.9 GM/DL — SIGNIFICANT CHANGE UP (ref 32–36)
MCHC RBC-ENTMCNC: 35.6 GM/DL — SIGNIFICANT CHANGE UP (ref 32–36)
MCV RBC AUTO: 88.8 FL — SIGNIFICANT CHANGE UP (ref 80–100)
MCV RBC AUTO: 89.9 FL — SIGNIFICANT CHANGE UP (ref 80–100)
MCV RBC AUTO: 90.4 FL — SIGNIFICANT CHANGE UP (ref 80–100)
MCV RBC AUTO: 90.5 FL — SIGNIFICANT CHANGE UP (ref 80–100)
MCV RBC AUTO: 90.5 FL — SIGNIFICANT CHANGE UP (ref 80–100)
MCV RBC AUTO: 91 FL — SIGNIFICANT CHANGE UP (ref 80–100)
MCV RBC AUTO: 91.2 FL — SIGNIFICANT CHANGE UP (ref 80–100)
MCV RBC AUTO: 91.3 FL — SIGNIFICANT CHANGE UP (ref 80–100)
MCV RBC AUTO: 91.3 FL — SIGNIFICANT CHANGE UP (ref 80–100)
MCV RBC AUTO: 91.5 FL — SIGNIFICANT CHANGE UP (ref 80–100)
MCV RBC AUTO: 91.6 FL — SIGNIFICANT CHANGE UP (ref 80–100)
MCV RBC AUTO: 91.7 FL — SIGNIFICANT CHANGE UP (ref 80–100)
MCV RBC AUTO: 92.4 FL — SIGNIFICANT CHANGE UP (ref 80–100)
MCV RBC AUTO: 92.5 FL — SIGNIFICANT CHANGE UP (ref 80–100)
MCV RBC AUTO: 92.7 FL — SIGNIFICANT CHANGE UP (ref 80–100)
MCV RBC AUTO: 92.9 FL — SIGNIFICANT CHANGE UP (ref 80–100)
MCV RBC AUTO: 92.9 FL — SIGNIFICANT CHANGE UP (ref 80–100)
MCV RBC AUTO: 93 FL — SIGNIFICANT CHANGE UP (ref 80–100)
MCV RBC AUTO: 93.1 FL — SIGNIFICANT CHANGE UP (ref 80–100)
MCV RBC AUTO: 93.3 FL — SIGNIFICANT CHANGE UP (ref 80–100)
MCV RBC AUTO: 93.4 FL — SIGNIFICANT CHANGE UP (ref 80–100)
MCV RBC AUTO: 93.9 FL — SIGNIFICANT CHANGE UP (ref 80–100)
MCV RBC AUTO: 94.1 FL — SIGNIFICANT CHANGE UP (ref 80–100)
MCV RBC AUTO: 94.6 FL — SIGNIFICANT CHANGE UP (ref 80–100)
MCV RBC AUTO: 97.6 FL — SIGNIFICANT CHANGE UP (ref 80–100)
METHADONE UR-MCNC: NEGATIVE — SIGNIFICANT CHANGE UP
METHOD TYPE: SIGNIFICANT CHANGE UP
MONOCYTES # BLD AUTO: 0.11 K/UL — SIGNIFICANT CHANGE UP (ref 0–0.9)
MONOCYTES # BLD AUTO: 0.46 K/UL — SIGNIFICANT CHANGE UP (ref 0–0.9)
MONOCYTES # BLD AUTO: 0.52 K/UL — SIGNIFICANT CHANGE UP (ref 0–0.9)
MONOCYTES # BLD AUTO: 0.53 K/UL — SIGNIFICANT CHANGE UP (ref 0–0.9)
MONOCYTES # BLD AUTO: 0.6 K/UL — SIGNIFICANT CHANGE UP (ref 0–0.9)
MONOCYTES # BLD AUTO: 0.65 K/UL — SIGNIFICANT CHANGE UP (ref 0–0.9)
MONOCYTES # BLD AUTO: 0.73 K/UL — SIGNIFICANT CHANGE UP (ref 0–0.9)
MONOCYTES # BLD AUTO: 0.79 K/UL — SIGNIFICANT CHANGE UP (ref 0–0.9)
MONOCYTES # BLD AUTO: 0.84 K/UL — SIGNIFICANT CHANGE UP (ref 0–0.9)
MONOCYTES # BLD AUTO: 0.86 K/UL — SIGNIFICANT CHANGE UP (ref 0–0.9)
MONOCYTES # BLD AUTO: 0.88 K/UL — SIGNIFICANT CHANGE UP (ref 0–0.9)
MONOCYTES # BLD AUTO: 1.1 K/UL — HIGH (ref 0–0.9)
MONOCYTES # BLD AUTO: 1.22 K/UL — HIGH (ref 0–0.9)
MONOCYTES # BLD AUTO: 1.28 K/UL — HIGH (ref 0–0.9)
MONOCYTES # BLD AUTO: 3.03 K/UL — HIGH (ref 0–0.9)
MONOCYTES NFR BLD AUTO: 0.9 % — LOW (ref 2–14)
MONOCYTES NFR BLD AUTO: 2.9 % — SIGNIFICANT CHANGE UP (ref 2–14)
MONOCYTES NFR BLD AUTO: 3.3 % — SIGNIFICANT CHANGE UP (ref 2–14)
MONOCYTES NFR BLD AUTO: 3.4 % — SIGNIFICANT CHANGE UP (ref 2–14)
MONOCYTES NFR BLD AUTO: 3.6 % — SIGNIFICANT CHANGE UP (ref 2–14)
MONOCYTES NFR BLD AUTO: 3.7 % — SIGNIFICANT CHANGE UP (ref 2–14)
MONOCYTES NFR BLD AUTO: 4.7 % — SIGNIFICANT CHANGE UP (ref 2–14)
MONOCYTES NFR BLD AUTO: 5 % — SIGNIFICANT CHANGE UP (ref 2–14)
MONOCYTES NFR BLD AUTO: 5.2 % — SIGNIFICANT CHANGE UP (ref 2–14)
MONOCYTES NFR BLD AUTO: 5.5 % — SIGNIFICANT CHANGE UP (ref 2–14)
MONOCYTES NFR BLD AUTO: 6 % — SIGNIFICANT CHANGE UP (ref 2–14)
MONOCYTES NFR BLD AUTO: 6.1 % — SIGNIFICANT CHANGE UP (ref 2–14)
MONOCYTES NFR BLD AUTO: 6.4 % — SIGNIFICANT CHANGE UP (ref 2–14)
MONOCYTES NFR BLD AUTO: 6.6 % — SIGNIFICANT CHANGE UP (ref 2–14)
MONOCYTES NFR BLD AUTO: 8.7 % — SIGNIFICANT CHANGE UP (ref 2–14)
MRSA PCR RESULT.: SIGNIFICANT CHANGE UP
MYELOCYTES NFR BLD: 0.9 % — HIGH (ref 0–0)
N MEN DNA SPEC QL NAA+PROBE: SIGNIFICANT CHANGE UP
NEUTROPHILS # BLD AUTO: 10.74 K/UL — HIGH (ref 1.8–7.4)
NEUTROPHILS # BLD AUTO: 10.91 K/UL — HIGH (ref 1.8–7.4)
NEUTROPHILS # BLD AUTO: 11.27 K/UL — HIGH (ref 1.8–7.4)
NEUTROPHILS # BLD AUTO: 11.46 K/UL — HIGH (ref 1.8–7.4)
NEUTROPHILS # BLD AUTO: 12.78 K/UL — HIGH (ref 1.8–7.4)
NEUTROPHILS # BLD AUTO: 12.97 K/UL — HIGH (ref 1.8–7.4)
NEUTROPHILS # BLD AUTO: 13.83 K/UL — HIGH (ref 1.8–7.4)
NEUTROPHILS # BLD AUTO: 14.17 K/UL — HIGH (ref 1.8–7.4)
NEUTROPHILS # BLD AUTO: 22.09 K/UL — HIGH (ref 1.8–7.4)
NEUTROPHILS # BLD AUTO: 22.19 K/UL — HIGH (ref 1.8–7.4)
NEUTROPHILS # BLD AUTO: 31.14 K/UL — HIGH (ref 1.8–7.4)
NEUTROPHILS # BLD AUTO: 44.04 K/UL — HIGH (ref 1.8–7.4)
NEUTROPHILS # BLD AUTO: 6.61 K/UL — SIGNIFICANT CHANGE UP (ref 1.8–7.4)
NEUTROPHILS # BLD AUTO: 9.18 K/UL — HIGH (ref 1.8–7.4)
NEUTROPHILS # BLD AUTO: 9.9 K/UL — HIGH (ref 1.8–7.4)
NEUTROPHILS # CSF: SIGNIFICANT CHANGE UP % (ref 0–6)
NEUTROPHILS NFR BLD AUTO: 69.1 % — SIGNIFICANT CHANGE UP (ref 43–77)
NEUTROPHILS NFR BLD AUTO: 70.5 % — SIGNIFICANT CHANGE UP (ref 43–77)
NEUTROPHILS NFR BLD AUTO: 75.1 % — SIGNIFICANT CHANGE UP (ref 43–77)
NEUTROPHILS NFR BLD AUTO: 76.7 % — SIGNIFICANT CHANGE UP (ref 43–77)
NEUTROPHILS NFR BLD AUTO: 78.8 % — HIGH (ref 43–77)
NEUTROPHILS NFR BLD AUTO: 82.9 % — HIGH (ref 43–77)
NEUTROPHILS NFR BLD AUTO: 83.8 % — HIGH (ref 43–77)
NEUTROPHILS NFR BLD AUTO: 87.9 % — HIGH (ref 43–77)
NEUTROPHILS NFR BLD AUTO: 88.7 % — HIGH (ref 43–77)
NEUTROPHILS NFR BLD AUTO: 88.8 % — HIGH (ref 43–77)
NEUTROPHILS NFR BLD AUTO: 89.1 % — HIGH (ref 43–77)
NEUTROPHILS NFR BLD AUTO: 89.5 % — HIGH (ref 43–77)
NEUTROPHILS NFR BLD AUTO: 90.1 % — HIGH (ref 43–77)
NEUTROPHILS NFR BLD AUTO: 93.4 % — HIGH (ref 43–77)
NEUTROPHILS NFR BLD AUTO: 94.1 % — HIGH (ref 43–77)
NEUTS BAND # BLD: 0.9 % — SIGNIFICANT CHANGE UP (ref 0–8)
NIGHT BLUE STAIN TISS: SIGNIFICANT CHANGE UP
NITRITE UR-MCNC: NEGATIVE — SIGNIFICANT CHANGE UP
NON HDL CHOLESTEROL: 101 MG/DL — SIGNIFICANT CHANGE UP
NON HDL CHOLESTEROL: 36 MG/DL — SIGNIFICANT CHANGE UP
NON HDL CHOLESTEROL: 43 MG/DL — SIGNIFICANT CHANGE UP
NON HDL CHOLESTEROL: 85 MG/DL — SIGNIFICANT CHANGE UP
NRBC NFR CSF: 0 /UL — SIGNIFICANT CHANGE UP (ref 0–5)
OPIATES UR-MCNC: POSITIVE
ORGANISM # SPEC MICROSCOPIC CNT: ABNORMAL
ORGANISM # SPEC MICROSCOPIC CNT: SIGNIFICANT CHANGE UP
OSMOLALITY SERPL: 311 MOSMOL/KG — HIGH (ref 275–300)
OSMOLALITY SERPL: 335 MOSMOL/KG — HIGH (ref 275–300)
OSMOLALITY UR: 208 MOSM/KG — LOW (ref 300–1000)
P18 AB. IGG: SIGNIFICANT CHANGE UP
P23 AB. IGG: SIGNIFICANT CHANGE UP
P23 AB. IGM: SIGNIFICANT CHANGE UP
P28 AB. IGG: SIGNIFICANT CHANGE UP
P30 AB. IGG: SIGNIFICANT CHANGE UP
P39 AB. IGG: SIGNIFICANT CHANGE UP
P39 AB. IGM: SIGNIFICANT CHANGE UP
P41 AB. IGG: SIGNIFICANT CHANGE UP
P41 AB. IGM: SIGNIFICANT CHANGE UP
P45 AB. IGG: SIGNIFICANT CHANGE UP
P58 AB. IGG: SIGNIFICANT CHANGE UP
P66 AB. IGG: SIGNIFICANT CHANGE UP
P93 AB. IGG: SIGNIFICANT CHANGE UP
PARECHOVIRUS A RNA SPEC QL NAA+PROBE: SIGNIFICANT CHANGE UP
PCO2 BLDA: 47 MMHG — SIGNIFICANT CHANGE UP (ref 35–48)
PCO2 BLDA: 48 MMHG — SIGNIFICANT CHANGE UP (ref 35–48)
PCO2 BLDA: 56 MMHG — HIGH (ref 35–48)
PCO2 BLDA: 57 MMHG — HIGH (ref 35–48)
PCO2 BLDA: 60 MMHG — HIGH (ref 35–48)
PCO2 BLDV: 42 MMHG — SIGNIFICANT CHANGE UP (ref 42–55)
PCO2 BLDV: 54 MMHG — SIGNIFICANT CHANGE UP (ref 42–55)
PCP SPEC-MCNC: SIGNIFICANT CHANGE UP
PCP UR-MCNC: NEGATIVE — SIGNIFICANT CHANGE UP
PH BLDA: 7.33 — LOW (ref 7.35–7.45)
PH BLDA: 7.34 — LOW (ref 7.35–7.45)
PH BLDA: 7.38 — SIGNIFICANT CHANGE UP (ref 7.35–7.45)
PH BLDA: 7.39 — SIGNIFICANT CHANGE UP (ref 7.35–7.45)
PH BLDA: 7.41 — SIGNIFICANT CHANGE UP (ref 7.35–7.45)
PH BLDV: 7.28 — LOW (ref 7.32–7.43)
PH BLDV: 7.38 — SIGNIFICANT CHANGE UP (ref 7.32–7.43)
PH UR: 6 — SIGNIFICANT CHANGE UP (ref 5–8)
PH UR: 7 — SIGNIFICANT CHANGE UP (ref 5–8)
PHOSPHATE SERPL-MCNC: 1.8 MG/DL — LOW (ref 2.4–4.7)
PHOSPHATE SERPL-MCNC: 2.1 MG/DL — LOW (ref 2.4–4.7)
PHOSPHATE SERPL-MCNC: 2.1 MG/DL — LOW (ref 2.4–4.7)
PHOSPHATE SERPL-MCNC: 2.3 MG/DL — LOW (ref 2.4–4.7)
PHOSPHATE SERPL-MCNC: 2.4 MG/DL — SIGNIFICANT CHANGE UP (ref 2.4–4.7)
PHOSPHATE SERPL-MCNC: 2.6 MG/DL — SIGNIFICANT CHANGE UP (ref 2.4–4.7)
PHOSPHATE SERPL-MCNC: 2.7 MG/DL — SIGNIFICANT CHANGE UP (ref 2.4–4.7)
PHOSPHATE SERPL-MCNC: 2.9 MG/DL — SIGNIFICANT CHANGE UP (ref 2.4–4.7)
PHOSPHATE SERPL-MCNC: 2.9 MG/DL — SIGNIFICANT CHANGE UP (ref 2.4–4.7)
PHOSPHATE SERPL-MCNC: 3.1 MG/DL — SIGNIFICANT CHANGE UP (ref 2.4–4.7)
PHOSPHATE SERPL-MCNC: 3.1 MG/DL — SIGNIFICANT CHANGE UP (ref 2.4–4.7)
PHOSPHATE SERPL-MCNC: 3.3 MG/DL — SIGNIFICANT CHANGE UP (ref 2.4–4.7)
PHOSPHATE SERPL-MCNC: 3.4 MG/DL — SIGNIFICANT CHANGE UP (ref 2.4–4.7)
PHOSPHATE SERPL-MCNC: 3.5 MG/DL — SIGNIFICANT CHANGE UP (ref 2.4–4.7)
PHOSPHATE SERPL-MCNC: 3.5 MG/DL — SIGNIFICANT CHANGE UP (ref 2.4–4.7)
PHOSPHATE SERPL-MCNC: 3.6 MG/DL — SIGNIFICANT CHANGE UP (ref 2.4–4.7)
PHOSPHATE SERPL-MCNC: 3.7 MG/DL — SIGNIFICANT CHANGE UP (ref 2.4–4.7)
PHOSPHATE SERPL-MCNC: 3.7 MG/DL — SIGNIFICANT CHANGE UP (ref 2.4–4.7)
PHOSPHATE SERPL-MCNC: 3.8 MG/DL — SIGNIFICANT CHANGE UP (ref 2.4–4.7)
PHOSPHATE SERPL-MCNC: 3.9 MG/DL — SIGNIFICANT CHANGE UP (ref 2.4–4.7)
PHOSPHATE SERPL-MCNC: 4 MG/DL — SIGNIFICANT CHANGE UP (ref 2.4–4.7)
PHOSPHATE SERPL-MCNC: 4.2 MG/DL — SIGNIFICANT CHANGE UP (ref 2.4–4.7)
PHOSPHATE SERPL-MCNC: 4.3 MG/DL — SIGNIFICANT CHANGE UP (ref 2.4–4.7)
PHOSPHATE SERPL-MCNC: 4.4 MG/DL — SIGNIFICANT CHANGE UP (ref 2.4–4.7)
PHOSPHATE SERPL-MCNC: 4.5 MG/DL — SIGNIFICANT CHANGE UP (ref 2.4–4.7)
PHOSPHATE SERPL-MCNC: 4.5 MG/DL — SIGNIFICANT CHANGE UP (ref 2.4–4.7)
PHOSPHATE SERPL-MCNC: 8.5 MG/DL — HIGH (ref 2.4–4.7)
PLAT MORPH BLD: NORMAL — SIGNIFICANT CHANGE UP
PLAT MORPH BLD: NORMAL — SIGNIFICANT CHANGE UP
PLATELET # BLD AUTO: 147 K/UL — LOW (ref 150–400)
PLATELET # BLD AUTO: 196 K/UL — SIGNIFICANT CHANGE UP (ref 150–400)
PLATELET # BLD AUTO: 207 K/UL — SIGNIFICANT CHANGE UP (ref 150–400)
PLATELET # BLD AUTO: 232 K/UL — SIGNIFICANT CHANGE UP (ref 150–400)
PLATELET # BLD AUTO: 244 K/UL — SIGNIFICANT CHANGE UP (ref 150–400)
PLATELET # BLD AUTO: 248 K/UL — SIGNIFICANT CHANGE UP (ref 150–400)
PLATELET # BLD AUTO: 263 K/UL — SIGNIFICANT CHANGE UP (ref 150–400)
PLATELET # BLD AUTO: 273 K/UL — SIGNIFICANT CHANGE UP (ref 150–400)
PLATELET # BLD AUTO: 274 K/UL — SIGNIFICANT CHANGE UP (ref 150–400)
PLATELET # BLD AUTO: 281 K/UL — SIGNIFICANT CHANGE UP (ref 150–400)
PLATELET # BLD AUTO: 293 K/UL — SIGNIFICANT CHANGE UP (ref 150–400)
PLATELET # BLD AUTO: 301 K/UL — SIGNIFICANT CHANGE UP (ref 150–400)
PLATELET # BLD AUTO: 308 K/UL — SIGNIFICANT CHANGE UP (ref 150–400)
PLATELET # BLD AUTO: 321 K/UL — SIGNIFICANT CHANGE UP (ref 150–400)
PLATELET # BLD AUTO: 334 K/UL — SIGNIFICANT CHANGE UP (ref 150–400)
PLATELET # BLD AUTO: 350 K/UL — SIGNIFICANT CHANGE UP (ref 150–400)
PLATELET # BLD AUTO: 352 K/UL — SIGNIFICANT CHANGE UP (ref 150–400)
PLATELET # BLD AUTO: 369 K/UL — SIGNIFICANT CHANGE UP (ref 150–400)
PLATELET # BLD AUTO: 372 K/UL — SIGNIFICANT CHANGE UP (ref 150–400)
PLATELET # BLD AUTO: 375 K/UL — SIGNIFICANT CHANGE UP (ref 150–400)
PLATELET # BLD AUTO: 381 K/UL — SIGNIFICANT CHANGE UP (ref 150–400)
PLATELET # BLD AUTO: 386 K/UL — SIGNIFICANT CHANGE UP (ref 150–400)
PLATELET # BLD AUTO: 387 K/UL — SIGNIFICANT CHANGE UP (ref 150–400)
PLATELET # BLD AUTO: 411 K/UL — HIGH (ref 150–400)
PLATELET # BLD AUTO: 416 K/UL — HIGH (ref 150–400)
PO2 BLDA: 102 MMHG — SIGNIFICANT CHANGE UP (ref 83–108)
PO2 BLDA: 142 MMHG — HIGH (ref 83–108)
PO2 BLDA: 152 MMHG — HIGH (ref 83–108)
PO2 BLDA: 166 MMHG — HIGH (ref 83–108)
PO2 BLDA: 95 MMHG — SIGNIFICANT CHANGE UP (ref 83–108)
PO2 BLDV: 47 MMHG — HIGH (ref 25–45)
PO2 BLDV: 66 MMHG — HIGH (ref 25–45)
POLYCHROMASIA BLD QL SMEAR: SLIGHT — SIGNIFICANT CHANGE UP
POTASSIUM BLDV-SCNC: 3 MMOL/L — LOW (ref 3.5–5.1)
POTASSIUM BLDV-SCNC: 3.6 MMOL/L — SIGNIFICANT CHANGE UP (ref 3.5–5.1)
POTASSIUM SERPL-MCNC: 3.1 MMOL/L — LOW (ref 3.5–5.3)
POTASSIUM SERPL-MCNC: 3.2 MMOL/L — LOW (ref 3.5–5.3)
POTASSIUM SERPL-MCNC: 3.2 MMOL/L — LOW (ref 3.5–5.3)
POTASSIUM SERPL-MCNC: 3.4 MMOL/L — LOW (ref 3.5–5.3)
POTASSIUM SERPL-MCNC: 3.6 MMOL/L — SIGNIFICANT CHANGE UP (ref 3.5–5.3)
POTASSIUM SERPL-MCNC: 3.7 MMOL/L — SIGNIFICANT CHANGE UP (ref 3.5–5.3)
POTASSIUM SERPL-MCNC: 3.8 MMOL/L — SIGNIFICANT CHANGE UP (ref 3.5–5.3)
POTASSIUM SERPL-MCNC: 3.9 MMOL/L — SIGNIFICANT CHANGE UP (ref 3.5–5.3)
POTASSIUM SERPL-MCNC: 4 MMOL/L — SIGNIFICANT CHANGE UP (ref 3.5–5.3)
POTASSIUM SERPL-MCNC: 4 MMOL/L — SIGNIFICANT CHANGE UP (ref 3.5–5.3)
POTASSIUM SERPL-MCNC: 4.1 MMOL/L — SIGNIFICANT CHANGE UP (ref 3.5–5.3)
POTASSIUM SERPL-MCNC: 4.2 MMOL/L — SIGNIFICANT CHANGE UP (ref 3.5–5.3)
POTASSIUM SERPL-MCNC: 4.2 MMOL/L — SIGNIFICANT CHANGE UP (ref 3.5–5.3)
POTASSIUM SERPL-MCNC: 4.3 MMOL/L — SIGNIFICANT CHANGE UP (ref 3.5–5.3)
POTASSIUM SERPL-MCNC: 4.4 MMOL/L — SIGNIFICANT CHANGE UP (ref 3.5–5.3)
POTASSIUM SERPL-MCNC: 4.6 MMOL/L — SIGNIFICANT CHANGE UP (ref 3.5–5.3)
POTASSIUM SERPL-MCNC: 4.6 MMOL/L — SIGNIFICANT CHANGE UP (ref 3.5–5.3)
POTASSIUM SERPL-MCNC: 4.8 MMOL/L — SIGNIFICANT CHANGE UP (ref 3.5–5.3)
POTASSIUM SERPL-MCNC: 4.9 MMOL/L — SIGNIFICANT CHANGE UP (ref 3.5–5.3)
POTASSIUM SERPL-MCNC: 4.9 MMOL/L — SIGNIFICANT CHANGE UP (ref 3.5–5.3)
POTASSIUM SERPL-MCNC: 5 MMOL/L — SIGNIFICANT CHANGE UP (ref 3.5–5.3)
POTASSIUM SERPL-MCNC: 5.1 MMOL/L — SIGNIFICANT CHANGE UP (ref 3.5–5.3)
POTASSIUM SERPL-MCNC: 5.1 MMOL/L — SIGNIFICANT CHANGE UP (ref 3.5–5.3)
POTASSIUM SERPL-MCNC: 5.3 MMOL/L — SIGNIFICANT CHANGE UP (ref 3.5–5.3)
POTASSIUM SERPL-SCNC: 3.1 MMOL/L — LOW (ref 3.5–5.3)
POTASSIUM SERPL-SCNC: 3.2 MMOL/L — LOW (ref 3.5–5.3)
POTASSIUM SERPL-SCNC: 3.2 MMOL/L — LOW (ref 3.5–5.3)
POTASSIUM SERPL-SCNC: 3.4 MMOL/L — LOW (ref 3.5–5.3)
POTASSIUM SERPL-SCNC: 3.6 MMOL/L — SIGNIFICANT CHANGE UP (ref 3.5–5.3)
POTASSIUM SERPL-SCNC: 3.7 MMOL/L — SIGNIFICANT CHANGE UP (ref 3.5–5.3)
POTASSIUM SERPL-SCNC: 3.8 MMOL/L — SIGNIFICANT CHANGE UP (ref 3.5–5.3)
POTASSIUM SERPL-SCNC: 3.9 MMOL/L — SIGNIFICANT CHANGE UP (ref 3.5–5.3)
POTASSIUM SERPL-SCNC: 4 MMOL/L — SIGNIFICANT CHANGE UP (ref 3.5–5.3)
POTASSIUM SERPL-SCNC: 4 MMOL/L — SIGNIFICANT CHANGE UP (ref 3.5–5.3)
POTASSIUM SERPL-SCNC: 4.1 MMOL/L — SIGNIFICANT CHANGE UP (ref 3.5–5.3)
POTASSIUM SERPL-SCNC: 4.2 MMOL/L — SIGNIFICANT CHANGE UP (ref 3.5–5.3)
POTASSIUM SERPL-SCNC: 4.2 MMOL/L — SIGNIFICANT CHANGE UP (ref 3.5–5.3)
POTASSIUM SERPL-SCNC: 4.3 MMOL/L — SIGNIFICANT CHANGE UP (ref 3.5–5.3)
POTASSIUM SERPL-SCNC: 4.4 MMOL/L — SIGNIFICANT CHANGE UP (ref 3.5–5.3)
POTASSIUM SERPL-SCNC: 4.6 MMOL/L — SIGNIFICANT CHANGE UP (ref 3.5–5.3)
POTASSIUM SERPL-SCNC: 4.6 MMOL/L — SIGNIFICANT CHANGE UP (ref 3.5–5.3)
POTASSIUM SERPL-SCNC: 4.8 MMOL/L — SIGNIFICANT CHANGE UP (ref 3.5–5.3)
POTASSIUM SERPL-SCNC: 4.9 MMOL/L — SIGNIFICANT CHANGE UP (ref 3.5–5.3)
POTASSIUM SERPL-SCNC: 4.9 MMOL/L — SIGNIFICANT CHANGE UP (ref 3.5–5.3)
POTASSIUM SERPL-SCNC: 5 MMOL/L — SIGNIFICANT CHANGE UP (ref 3.5–5.3)
POTASSIUM SERPL-SCNC: 5.1 MMOL/L — SIGNIFICANT CHANGE UP (ref 3.5–5.3)
POTASSIUM SERPL-SCNC: 5.1 MMOL/L — SIGNIFICANT CHANGE UP (ref 3.5–5.3)
POTASSIUM SERPL-SCNC: 5.3 MMOL/L — SIGNIFICANT CHANGE UP (ref 3.5–5.3)
POTASSIUM UR-SCNC: 4 MMOL/L — SIGNIFICANT CHANGE UP
PROCALCITONIN SERPL-MCNC: 11.1 NG/ML — HIGH (ref 0.02–0.1)
PROCALCITONIN SERPL-MCNC: 2.74 NG/ML — HIGH (ref 0.02–0.1)
PROT ?TM UR-MCNC: <4 MG/DL — SIGNIFICANT CHANGE UP (ref 0–12)
PROT CSF-MCNC: 47 MG/DL — HIGH (ref 15–45)
PROT SERPL-MCNC: 4.4 G/DL — LOW (ref 6.6–8.7)
PROT SERPL-MCNC: 5.4 G/DL — LOW (ref 6.6–8.7)
PROT SERPL-MCNC: 5.5 G/DL — LOW (ref 6.6–8.7)
PROT SERPL-MCNC: 5.5 G/DL — LOW (ref 6.6–8.7)
PROT SERPL-MCNC: 5.9 G/DL — LOW (ref 6.6–8.7)
PROT SERPL-MCNC: 6 G/DL — LOW (ref 6.6–8.7)
PROT SERPL-MCNC: 6.2 G/DL — LOW (ref 6.6–8.7)
PROT SERPL-MCNC: 6.2 G/DL — LOW (ref 6.6–8.7)
PROT SERPL-MCNC: 6.4 G/DL — LOW (ref 6.6–8.7)
PROT SERPL-MCNC: 6.4 G/DL — LOW (ref 6.6–8.7)
PROT SERPL-MCNC: 6.5 G/DL — LOW (ref 6.6–8.7)
PROT SERPL-MCNC: 6.6 G/DL — SIGNIFICANT CHANGE UP (ref 6.6–8.7)
PROT SERPL-MCNC: 6.7 G/DL — SIGNIFICANT CHANGE UP (ref 6.6–8.7)
PROT SERPL-MCNC: 6.7 G/DL — SIGNIFICANT CHANGE UP (ref 6.6–8.7)
PROT SERPL-MCNC: 7.1 G/DL — SIGNIFICANT CHANGE UP (ref 6.6–8.7)
PROT SERPL-MCNC: 7.5 G/DL — SIGNIFICANT CHANGE UP (ref 6.6–8.7)
PROT SERPL-MCNC: 7.6 G/DL — SIGNIFICANT CHANGE UP (ref 6.6–8.7)
PROT UR-MCNC: 100 MG/DL
PROT UR-MCNC: NEGATIVE MG/DL — SIGNIFICANT CHANGE UP
PROT/CREAT UR-RTO: <0.5 RATIO — HIGH
RBC # BLD: 3.57 M/UL — LOW (ref 4.2–5.8)
RBC # BLD: 3.64 M/UL — LOW (ref 4.2–5.8)
RBC # BLD: 3.73 M/UL — LOW (ref 4.2–5.8)
RBC # BLD: 3.9 M/UL — LOW (ref 4.2–5.8)
RBC # BLD: 4.01 M/UL — LOW (ref 4.2–5.8)
RBC # BLD: 4.04 M/UL — LOW (ref 4.2–5.8)
RBC # BLD: 4.26 M/UL — SIGNIFICANT CHANGE UP (ref 4.2–5.8)
RBC # BLD: 4.37 M/UL — SIGNIFICANT CHANGE UP (ref 4.2–5.8)
RBC # BLD: 4.39 M/UL — SIGNIFICANT CHANGE UP (ref 4.2–5.8)
RBC # BLD: 4.41 M/UL — SIGNIFICANT CHANGE UP (ref 4.2–5.8)
RBC # BLD: 4.41 M/UL — SIGNIFICANT CHANGE UP (ref 4.2–5.8)
RBC # BLD: 4.44 M/UL — SIGNIFICANT CHANGE UP (ref 4.2–5.8)
RBC # BLD: 4.45 M/UL — SIGNIFICANT CHANGE UP (ref 4.2–5.8)
RBC # BLD: 4.49 M/UL — SIGNIFICANT CHANGE UP (ref 4.2–5.8)
RBC # BLD: 4.58 M/UL — SIGNIFICANT CHANGE UP (ref 4.2–5.8)
RBC # BLD: 4.59 M/UL — SIGNIFICANT CHANGE UP (ref 4.2–5.8)
RBC # BLD: 4.59 M/UL — SIGNIFICANT CHANGE UP (ref 4.2–5.8)
RBC # BLD: 4.64 M/UL — SIGNIFICANT CHANGE UP (ref 4.2–5.8)
RBC # BLD: 4.72 M/UL — SIGNIFICANT CHANGE UP (ref 4.2–5.8)
RBC # BLD: 4.77 M/UL — SIGNIFICANT CHANGE UP (ref 4.2–5.8)
RBC # BLD: 4.78 M/UL — SIGNIFICANT CHANGE UP (ref 4.2–5.8)
RBC # BLD: 4.81 M/UL — SIGNIFICANT CHANGE UP (ref 4.2–5.8)
RBC # BLD: 4.91 M/UL — SIGNIFICANT CHANGE UP (ref 4.2–5.8)
RBC # BLD: 4.95 M/UL — SIGNIFICANT CHANGE UP (ref 4.2–5.8)
RBC # BLD: 5.09 M/UL — SIGNIFICANT CHANGE UP (ref 4.2–5.8)
RBC # CSF: 1 /CMM — SIGNIFICANT CHANGE UP (ref 0–1)
RBC # FLD: 12.2 % — SIGNIFICANT CHANGE UP (ref 10.3–14.5)
RBC # FLD: 12.2 % — SIGNIFICANT CHANGE UP (ref 10.3–14.5)
RBC # FLD: 12.4 % — SIGNIFICANT CHANGE UP (ref 10.3–14.5)
RBC # FLD: 12.6 % — SIGNIFICANT CHANGE UP (ref 10.3–14.5)
RBC # FLD: 12.7 % — SIGNIFICANT CHANGE UP (ref 10.3–14.5)
RBC # FLD: 12.7 % — SIGNIFICANT CHANGE UP (ref 10.3–14.5)
RBC # FLD: 12.8 % — SIGNIFICANT CHANGE UP (ref 10.3–14.5)
RBC # FLD: 12.9 % — SIGNIFICANT CHANGE UP (ref 10.3–14.5)
RBC # FLD: 12.9 % — SIGNIFICANT CHANGE UP (ref 10.3–14.5)
RBC # FLD: 13.1 % — SIGNIFICANT CHANGE UP (ref 10.3–14.5)
RBC # FLD: 13.1 % — SIGNIFICANT CHANGE UP (ref 10.3–14.5)
RBC # FLD: 13.2 % — SIGNIFICANT CHANGE UP (ref 10.3–14.5)
RBC # FLD: 13.3 % — SIGNIFICANT CHANGE UP (ref 10.3–14.5)
RBC # FLD: 13.5 % — SIGNIFICANT CHANGE UP (ref 10.3–14.5)
RBC # FLD: 13.5 % — SIGNIFICANT CHANGE UP (ref 10.3–14.5)
RBC # FLD: 13.6 % — SIGNIFICANT CHANGE UP (ref 10.3–14.5)
RBC # FLD: 14.1 % — SIGNIFICANT CHANGE UP (ref 10.3–14.5)
RBC BLD AUTO: ABNORMAL
RBC BLD AUTO: ABNORMAL
RBC CASTS # UR COMP ASSIST: 0 /HPF — SIGNIFICANT CHANGE UP (ref 0–4)
RBC CASTS # UR COMP ASSIST: 26 /HPF — HIGH (ref 0–4)
RBC CASTS # UR COMP ASSIST: 5 /HPF — HIGH (ref 0–4)
S AUREUS DNA NOSE QL NAA+PROBE: DETECTED
S PNEUM DNA SPEC QL NAA+PROBE: SIGNIFICANT CHANGE UP
SALICYLATES SERPL-MCNC: <0.6 MG/DL — LOW (ref 10–20)
SALICYLATES SERPL-MCNC: <0.6 MG/DL — LOW (ref 10–20)
SAO2 % BLDA: 100 % — HIGH (ref 94–98)
SAO2 % BLDA: 98.4 % — HIGH (ref 94–98)
SAO2 % BLDV: 72.6 % — SIGNIFICANT CHANGE UP
SAO2 % BLDV: 94.2 % — SIGNIFICANT CHANGE UP
SODIUM SERPL-SCNC: 135 MMOL/L — SIGNIFICANT CHANGE UP (ref 135–145)
SODIUM SERPL-SCNC: 137 MMOL/L — SIGNIFICANT CHANGE UP (ref 135–145)
SODIUM SERPL-SCNC: 138 MMOL/L — SIGNIFICANT CHANGE UP (ref 135–145)
SODIUM SERPL-SCNC: 138 MMOL/L — SIGNIFICANT CHANGE UP (ref 135–145)
SODIUM SERPL-SCNC: 139 MMOL/L — SIGNIFICANT CHANGE UP (ref 135–145)
SODIUM SERPL-SCNC: 140 MMOL/L — SIGNIFICANT CHANGE UP (ref 135–145)
SODIUM SERPL-SCNC: 141 MMOL/L — SIGNIFICANT CHANGE UP (ref 135–145)
SODIUM SERPL-SCNC: 142 MMOL/L — SIGNIFICANT CHANGE UP (ref 135–145)
SODIUM SERPL-SCNC: 142 MMOL/L — SIGNIFICANT CHANGE UP (ref 135–145)
SODIUM SERPL-SCNC: 143 MMOL/L — SIGNIFICANT CHANGE UP (ref 135–145)
SODIUM SERPL-SCNC: 144 MMOL/L — SIGNIFICANT CHANGE UP (ref 135–145)
SODIUM SERPL-SCNC: 145 MMOL/L — SIGNIFICANT CHANGE UP (ref 135–145)
SODIUM SERPL-SCNC: 145 MMOL/L — SIGNIFICANT CHANGE UP (ref 135–145)
SODIUM SERPL-SCNC: 146 MMOL/L — HIGH (ref 135–145)
SODIUM SERPL-SCNC: 147 MMOL/L — HIGH (ref 135–145)
SODIUM SERPL-SCNC: 148 MMOL/L — HIGH (ref 135–145)
SODIUM SERPL-SCNC: 149 MMOL/L — HIGH (ref 135–145)
SODIUM SERPL-SCNC: 150 MMOL/L — HIGH (ref 135–145)
SODIUM UR-SCNC: 61 MMOL/L — SIGNIFICANT CHANGE UP
SP GR SPEC: 1.01 — SIGNIFICANT CHANGE UP (ref 1–1.03)
SP GR SPEC: 1.01 — SIGNIFICANT CHANGE UP (ref 1–1.03)
SP GR SPEC: 1.02 — SIGNIFICANT CHANGE UP (ref 1–1.03)
SP GR SPEC: 1.02 — SIGNIFICANT CHANGE UP (ref 1–1.03)
SPECIMEN SOURCE: SIGNIFICANT CHANGE UP
SQUAMOUS # UR AUTO: 0 /HPF — SIGNIFICANT CHANGE UP (ref 0–5)
SQUAMOUS # UR AUTO: 0 /HPF — SIGNIFICANT CHANGE UP (ref 0–5)
SQUAMOUS # UR AUTO: 13 /HPF — HIGH (ref 0–5)
THC UR QL: POSITIVE
TRIGL SERPL-MCNC: 168 MG/DL — HIGH
TRIGL SERPL-MCNC: 181 MG/DL — HIGH
TRIGL SERPL-MCNC: 184 MG/DL — HIGH
TRIGL SERPL-MCNC: 60 MG/DL — SIGNIFICANT CHANGE UP
TRIGL SERPL-MCNC: 64 MG/DL — SIGNIFICANT CHANGE UP
TSH SERPL-MCNC: 0.83 UIU/ML — SIGNIFICANT CHANGE UP (ref 0.27–4.2)
TUBE TYPE: SIGNIFICANT CHANGE UP
UROBILINOGEN FLD QL: 0.2 MG/DL — SIGNIFICANT CHANGE UP (ref 0.2–1)
UROBILINOGEN FLD QL: 0.2 MG/DL — SIGNIFICANT CHANGE UP (ref 0.2–1)
UROBILINOGEN FLD QL: 1 MG/DL — SIGNIFICANT CHANGE UP (ref 0.2–1)
UROBILINOGEN FLD QL: 1 MG/DL — SIGNIFICANT CHANGE UP (ref 0.2–1)
UUN UR-MCNC: 201 MG/DL — SIGNIFICANT CHANGE UP
VALPROATE SERPL-MCNC: 48.8 UG/ML — LOW (ref 50–100)
VANCOMYCIN TROUGH SERPL-MCNC: 11 UG/ML — SIGNIFICANT CHANGE UP (ref 10–20)
VANCOMYCIN TROUGH SERPL-MCNC: 11.3 UG/ML — SIGNIFICANT CHANGE UP (ref 10–20)
VANCOMYCIN TROUGH SERPL-MCNC: 13.8 UG/ML — SIGNIFICANT CHANGE UP (ref 10–20)
VANCOMYCIN TROUGH SERPL-MCNC: 19.6 UG/ML — SIGNIFICANT CHANGE UP (ref 10–20)
VANCOMYCIN TROUGH SERPL-MCNC: 35.3 UG/ML — CRITICAL HIGH (ref 10–20)
VARIANT LYMPHS # BLD: 1.7 % — SIGNIFICANT CHANGE UP (ref 0–6)
VDRL CSF-TITR: SIGNIFICANT CHANGE UP
VZV DNA CSF QL NAA+PROBE: SIGNIFICANT CHANGE UP
WBC # BLD: 10.32 K/UL — SIGNIFICANT CHANGE UP (ref 3.8–10.5)
WBC # BLD: 10.69 K/UL — HIGH (ref 3.8–10.5)
WBC # BLD: 11.97 K/UL — HIGH (ref 3.8–10.5)
WBC # BLD: 12.22 K/UL — HIGH (ref 3.8–10.5)
WBC # BLD: 12.8 K/UL — HIGH (ref 3.8–10.5)
WBC # BLD: 12.88 K/UL — HIGH (ref 3.8–10.5)
WBC # BLD: 13.04 K/UL — HIGH (ref 3.8–10.5)
WBC # BLD: 13.83 K/UL — HIGH (ref 3.8–10.5)
WBC # BLD: 14.04 K/UL — HIGH (ref 3.8–10.5)
WBC # BLD: 14.56 K/UL — HIGH (ref 3.8–10.5)
WBC # BLD: 15.06 K/UL — HIGH (ref 3.8–10.5)
WBC # BLD: 15.66 K/UL — HIGH (ref 3.8–10.5)
WBC # BLD: 15.73 K/UL — HIGH (ref 3.8–10.5)
WBC # BLD: 15.82 K/UL — HIGH (ref 3.8–10.5)
WBC # BLD: 16.67 K/UL — HIGH (ref 3.8–10.5)
WBC # BLD: 16.67 K/UL — HIGH (ref 3.8–10.5)
WBC # BLD: 16.93 K/UL — HIGH (ref 3.8–10.5)
WBC # BLD: 23.77 K/UL — HIGH (ref 3.8–10.5)
WBC # BLD: 24.63 K/UL — HIGH (ref 3.8–10.5)
WBC # BLD: 34.83 K/UL — HIGH (ref 3.8–10.5)
WBC # BLD: 49.65 K/UL — CRITICAL HIGH (ref 3.8–10.5)
WBC # BLD: 60.75 K/UL — CRITICAL HIGH (ref 3.8–10.5)
WBC # BLD: 86.4 K/UL — CRITICAL HIGH (ref 3.8–10.5)
WBC # BLD: 9.58 K/UL — SIGNIFICANT CHANGE UP (ref 3.8–10.5)
WBC # BLD: 93.6 K/UL — CRITICAL HIGH (ref 3.8–10.5)
WBC # FLD AUTO: 10.32 K/UL — SIGNIFICANT CHANGE UP (ref 3.8–10.5)
WBC # FLD AUTO: 10.69 K/UL — HIGH (ref 3.8–10.5)
WBC # FLD AUTO: 11.97 K/UL — HIGH (ref 3.8–10.5)
WBC # FLD AUTO: 12.22 K/UL — HIGH (ref 3.8–10.5)
WBC # FLD AUTO: 12.8 K/UL — HIGH (ref 3.8–10.5)
WBC # FLD AUTO: 12.88 K/UL — HIGH (ref 3.8–10.5)
WBC # FLD AUTO: 13.04 K/UL — HIGH (ref 3.8–10.5)
WBC # FLD AUTO: 13.83 K/UL — HIGH (ref 3.8–10.5)
WBC # FLD AUTO: 14.04 K/UL — HIGH (ref 3.8–10.5)
WBC # FLD AUTO: 14.56 K/UL — HIGH (ref 3.8–10.5)
WBC # FLD AUTO: 15.06 K/UL — HIGH (ref 3.8–10.5)
WBC # FLD AUTO: 15.66 K/UL — HIGH (ref 3.8–10.5)
WBC # FLD AUTO: 15.73 K/UL — HIGH (ref 3.8–10.5)
WBC # FLD AUTO: 15.82 K/UL — HIGH (ref 3.8–10.5)
WBC # FLD AUTO: 16.67 K/UL — HIGH (ref 3.8–10.5)
WBC # FLD AUTO: 16.67 K/UL — HIGH (ref 3.8–10.5)
WBC # FLD AUTO: 16.93 K/UL — HIGH (ref 3.8–10.5)
WBC # FLD AUTO: 23.77 K/UL — HIGH (ref 3.8–10.5)
WBC # FLD AUTO: 24.63 K/UL — HIGH (ref 3.8–10.5)
WBC # FLD AUTO: 34.83 K/UL — HIGH (ref 3.8–10.5)
WBC # FLD AUTO: 49.65 K/UL — CRITICAL HIGH (ref 3.8–10.5)
WBC # FLD AUTO: 60.75 K/UL — CRITICAL HIGH (ref 3.8–10.5)
WBC # FLD AUTO: 86.4 K/UL — CRITICAL HIGH (ref 3.8–10.5)
WBC # FLD AUTO: 9.58 K/UL — SIGNIFICANT CHANGE UP (ref 3.8–10.5)
WBC # FLD AUTO: 93.6 K/UL — CRITICAL HIGH (ref 3.8–10.5)
WBC UR QL: 0 /HPF — SIGNIFICANT CHANGE UP (ref 0–5)
WBC UR QL: 2 /HPF — SIGNIFICANT CHANGE UP (ref 0–5)
WBC UR QL: 7 /HPF — HIGH (ref 0–5)
WNV IGG CSF IA-ACNC: NEGATIVE — SIGNIFICANT CHANGE UP
WNV IGM CSF IA-ACNC: NEGATIVE — SIGNIFICANT CHANGE UP

## 2024-01-01 PROCEDURE — 74177 CT ABD & PELVIS W/CONTRAST: CPT | Mod: MC

## 2024-01-01 PROCEDURE — 99233 SBSQ HOSP IP/OBS HIGH 50: CPT | Mod: GC

## 2024-01-01 PROCEDURE — 74018 RADEX ABDOMEN 1 VIEW: CPT | Mod: 26

## 2024-01-01 PROCEDURE — 36415 COLL VENOUS BLD VENIPUNCTURE: CPT

## 2024-01-01 PROCEDURE — 99291 CRITICAL CARE FIRST HOUR: CPT

## 2024-01-01 PROCEDURE — 84295 ASSAY OF SERUM SODIUM: CPT

## 2024-01-01 PROCEDURE — 96376 TX/PRO/DX INJ SAME DRUG ADON: CPT

## 2024-01-01 PROCEDURE — 95720 EEG PHY/QHP EA INCR W/VEEG: CPT

## 2024-01-01 PROCEDURE — 99284 EMERGENCY DEPT VISIT MOD MDM: CPT | Mod: 25

## 2024-01-01 PROCEDURE — 82803 BLOOD GASES ANY COMBINATION: CPT

## 2024-01-01 PROCEDURE — 71045 X-RAY EXAM CHEST 1 VIEW: CPT | Mod: 26

## 2024-01-01 PROCEDURE — 70450 CT HEAD/BRAIN W/O DYE: CPT | Mod: 26

## 2024-01-01 PROCEDURE — 80076 HEPATIC FUNCTION PANEL: CPT

## 2024-01-01 PROCEDURE — 99285 EMERGENCY DEPT VISIT HI MDM: CPT

## 2024-01-01 PROCEDURE — 95718 EEG PHYS/QHP 2-12 HR W/VEEG: CPT

## 2024-01-01 PROCEDURE — 83690 ASSAY OF LIPASE: CPT

## 2024-01-01 PROCEDURE — 84132 ASSAY OF SERUM POTASSIUM: CPT

## 2024-01-01 PROCEDURE — 82947 ASSAY GLUCOSE BLOOD QUANT: CPT

## 2024-01-01 PROCEDURE — 93010 ELECTROCARDIOGRAM REPORT: CPT

## 2024-01-01 PROCEDURE — 70450 CT HEAD/BRAIN W/O DYE: CPT | Mod: 26,MC

## 2024-01-01 PROCEDURE — 36620 INSERTION CATHETER ARTERY: CPT | Mod: GC

## 2024-01-01 PROCEDURE — 80307 DRUG TEST PRSMV CHEM ANLYZR: CPT

## 2024-01-01 PROCEDURE — 99291 CRITICAL CARE FIRST HOUR: CPT | Mod: GC

## 2024-01-01 PROCEDURE — 96372 THER/PROPH/DIAG INJ SC/IM: CPT | Mod: XU

## 2024-01-01 PROCEDURE — 96375 TX/PRO/DX INJ NEW DRUG ADDON: CPT

## 2024-01-01 PROCEDURE — 99282 EMERGENCY DEPT VISIT SF MDM: CPT

## 2024-01-01 PROCEDURE — 71045 X-RAY EXAM CHEST 1 VIEW: CPT | Mod: 26,77

## 2024-01-01 PROCEDURE — 80061 LIPID PANEL: CPT

## 2024-01-01 PROCEDURE — 93970 EXTREMITY STUDY: CPT | Mod: 26

## 2024-01-01 PROCEDURE — 83605 ASSAY OF LACTIC ACID: CPT

## 2024-01-01 PROCEDURE — 96374 THER/PROPH/DIAG INJ IV PUSH: CPT | Mod: XU

## 2024-01-01 PROCEDURE — 74177 CT ABD & PELVIS W/CONTRAST: CPT | Mod: 26,MC

## 2024-01-01 PROCEDURE — 82435 ASSAY OF BLOOD CHLORIDE: CPT

## 2024-01-01 PROCEDURE — 71250 CT THORAX DX C-: CPT | Mod: 26

## 2024-01-01 PROCEDURE — 71045 X-RAY EXAM CHEST 1 VIEW: CPT | Mod: 26,76

## 2024-01-01 PROCEDURE — 93306 TTE W/DOPPLER COMPLETE: CPT | Mod: 26

## 2024-01-01 PROCEDURE — 99222 1ST HOSP IP/OBS MODERATE 55: CPT

## 2024-01-01 PROCEDURE — 72125 CT NECK SPINE W/O DYE: CPT | Mod: MC

## 2024-01-01 PROCEDURE — 99292 CRITICAL CARE ADDL 30 MIN: CPT | Mod: 25

## 2024-01-01 PROCEDURE — 85018 HEMOGLOBIN: CPT

## 2024-01-01 PROCEDURE — 31500 INSERT EMERGENCY AIRWAY: CPT

## 2024-01-01 PROCEDURE — 85014 HEMATOCRIT: CPT

## 2024-01-01 PROCEDURE — 80048 BASIC METABOLIC PNL TOTAL CA: CPT

## 2024-01-01 PROCEDURE — 70450 CT HEAD/BRAIN W/O DYE: CPT | Mod: MC

## 2024-01-01 PROCEDURE — 99053 MED SERV 10PM-8AM 24 HR FAC: CPT

## 2024-01-01 PROCEDURE — 99291 CRITICAL CARE FIRST HOUR: CPT | Mod: GC,25

## 2024-01-01 PROCEDURE — 31624 DX BRONCHOSCOPE/LAVAGE: CPT

## 2024-01-01 PROCEDURE — 99283 EMERGENCY DEPT VISIT LOW MDM: CPT

## 2024-01-01 PROCEDURE — 72125 CT NECK SPINE W/O DYE: CPT | Mod: 26,MC

## 2024-01-01 PROCEDURE — 85025 COMPLETE CBC W/AUTO DIFF WBC: CPT

## 2024-01-01 PROCEDURE — 82330 ASSAY OF CALCIUM: CPT

## 2024-01-01 PROCEDURE — 74176 CT ABD & PELVIS W/O CONTRAST: CPT | Mod: 26

## 2024-01-01 PROCEDURE — 99291 CRITICAL CARE FIRST HOUR: CPT | Mod: 25

## 2024-01-01 RX ORDER — AZITHROMYCIN 250 MG/1
TABLET, FILM COATED ORAL
Refills: 0 | Status: DISCONTINUED | OUTPATIENT
Start: 2024-01-01 | End: 2024-01-01

## 2024-01-01 RX ORDER — SODIUM CHLORIDE 3 G/100ML
250 INJECTION, SOLUTION INTRAVENOUS ONCE
Refills: 0 | Status: COMPLETED | OUTPATIENT
Start: 2024-01-01 | End: 2024-01-01

## 2024-01-01 RX ORDER — MAGNESIUM SULFATE 100 %
2 POWDER (GRAM) MISCELLANEOUS ONCE
Refills: 0 | Status: COMPLETED | OUTPATIENT
Start: 2024-01-01 | End: 2024-01-01

## 2024-01-01 RX ORDER — ACETAMINOPHEN 500 MG
1000 TABLET ORAL ONCE
Refills: 0 | Status: COMPLETED | OUTPATIENT
Start: 2024-01-01 | End: 2024-01-01

## 2024-01-01 RX ORDER — PIPERACILLIN SODIUM AND TAZOBACTAM SODIUM 3; .375 G/15ML; G/15ML
3.38 INJECTION, POWDER, LYOPHILIZED, FOR SOLUTION INTRAVENOUS EVERY 8 HOURS
Refills: 0 | Status: DISCONTINUED | OUTPATIENT
Start: 2024-01-01 | End: 2024-01-01

## 2024-01-01 RX ORDER — DEXTROSE MONOHYDRATE AND SODIUM CHLORIDE 5; .3 G/100ML; G/100ML
1500 INJECTION, SOLUTION INTRAVENOUS ONCE
Refills: 0 | Status: COMPLETED | OUTPATIENT
Start: 2024-01-01 | End: 2024-01-01

## 2024-01-01 RX ORDER — MEROPENEM 500 MG/1
1000 INJECTION, POWDER, FOR SOLUTION INTRAVENOUS EVERY 8 HOURS
Refills: 0 | Status: DISCONTINUED | OUTPATIENT
Start: 2024-01-01 | End: 2024-01-01

## 2024-01-01 RX ORDER — VALPROIC ACID 250 MG/5ML
500 SOLUTION ORAL THREE TIMES A DAY
Refills: 0 | Status: DISCONTINUED | OUTPATIENT
Start: 2024-01-01 | End: 2024-01-01

## 2024-01-01 RX ORDER — LORAZEPAM 0.5 MG
4 TABLET ORAL ONCE
Refills: 0 | Status: DISCONTINUED | OUTPATIENT
Start: 2024-01-01 | End: 2024-01-01

## 2024-01-01 RX ORDER — PIPERACILLIN SODIUM AND TAZOBACTAM SODIUM 3; .375 G/15ML; G/15ML
3.38 INJECTION, POWDER, LYOPHILIZED, FOR SOLUTION INTRAVENOUS ONCE
Refills: 0 | Status: COMPLETED | OUTPATIENT
Start: 2024-01-01 | End: 2024-01-01

## 2024-01-01 RX ORDER — DEXTROSE MONOHYDRATE AND SODIUM CHLORIDE 5; .3 G/100ML; G/100ML
1000 INJECTION, SOLUTION INTRAVENOUS ONCE
Refills: 0 | Status: COMPLETED | OUTPATIENT
Start: 2024-01-01 | End: 2024-01-01

## 2024-01-01 RX ORDER — FENTANYL CITRATE 50 UG/ML
0.5 INJECTION, SOLUTION INTRAMUSCULAR; INTRAVENOUS
Qty: 2500 | Refills: 0 | Status: DISCONTINUED | OUTPATIENT
Start: 2024-01-01 | End: 2024-01-01

## 2024-01-01 RX ORDER — PHENYLEPHRINE HYDROCHLORIDE 10 MG/ML
0.1 INJECTION INTRAVENOUS
Qty: 40 | Refills: 0 | Status: DISCONTINUED | OUTPATIENT
Start: 2024-01-01 | End: 2024-01-01

## 2024-01-01 RX ORDER — NOREPINEPHRINE BITARTRATE 1 MG/ML
0.05 INJECTION INTRAVENOUS
Qty: 8 | Refills: 0 | Status: DISCONTINUED | OUTPATIENT
Start: 2024-01-01 | End: 2024-06-24

## 2024-01-01 RX ORDER — AZITHROMYCIN 250 MG/1
500 TABLET, FILM COATED ORAL ONCE
Refills: 0 | Status: COMPLETED | OUTPATIENT
Start: 2024-01-01 | End: 2024-01-01

## 2024-01-01 RX ORDER — ACETAMINOPHEN 325 MG
1000 TABLET ORAL ONCE
Refills: 0 | Status: COMPLETED | OUTPATIENT
Start: 2024-01-01 | End: 2024-01-01

## 2024-01-01 RX ORDER — SODIUM CHLORIDE 0.9 % (FLUSH) 0.9 %
10 SYRINGE (ML) INJECTION
Refills: 0 | Status: DISCONTINUED | OUTPATIENT
Start: 2024-01-01 | End: 2024-01-01

## 2024-01-01 RX ORDER — POTASSIUM PHOSPHATE, MONOBASIC POTASSIUM PHOSPHATE, DIBASIC INJECTION, 236; 224 MG/ML; MG/ML
30 SOLUTION, CONCENTRATE INTRAVENOUS ONCE
Refills: 0 | Status: DISCONTINUED | OUTPATIENT
Start: 2024-01-01 | End: 2024-01-01

## 2024-01-01 RX ORDER — POTASSIUM CHLORIDE 600 MG/1
10 TABLET, FILM COATED, EXTENDED RELEASE ORAL
Refills: 0 | Status: COMPLETED | OUTPATIENT
Start: 2024-01-01 | End: 2024-01-01

## 2024-01-01 RX ORDER — DIAZEPAM 10 MG/1
10 TABLET ORAL EVERY 6 HOURS
Refills: 0 | Status: DISCONTINUED | OUTPATIENT
Start: 2024-01-01 | End: 2024-01-01

## 2024-01-01 RX ORDER — OLANZAPINE 2.5 MG/1
10 TABLET, FILM COATED ORAL ONCE
Refills: 0 | Status: COMPLETED | OUTPATIENT
Start: 2024-01-01 | End: 2024-01-01

## 2024-01-01 RX ORDER — MIDAZOLAM HYDROCHLORIDE 1 MG/ML
0.02 INJECTION INTRAMUSCULAR; INTRAVENOUS
Qty: 100 | Refills: 0 | Status: DISCONTINUED | OUTPATIENT
Start: 2024-01-01 | End: 2024-01-01

## 2024-01-01 RX ORDER — HYDROMORPHONE HCL 0.2 MG/ML
2 INJECTION, SOLUTION INTRAVENOUS ONCE
Refills: 0 | Status: DISCONTINUED | OUTPATIENT
Start: 2024-01-01 | End: 2024-01-01

## 2024-01-01 RX ORDER — MANNITOL 20 %
70 INTRAVENOUS SOLUTION INTRAVENOUS ONCE
Refills: 0 | Status: DISCONTINUED | OUTPATIENT
Start: 2024-01-01 | End: 2024-01-01

## 2024-01-01 RX ORDER — DEXTROSE MONOHYDRATE AND SODIUM CHLORIDE 5; .3 G/100ML; G/100ML
1000 INJECTION, SOLUTION INTRAVENOUS
Refills: 0 | Status: DISCONTINUED | OUTPATIENT
Start: 2024-01-01 | End: 2024-01-01

## 2024-01-01 RX ORDER — PROPOFOL 10 MG/ML
20 INJECTION, EMULSION INTRAVENOUS
Qty: 1000 | Refills: 0 | Status: DISCONTINUED | OUTPATIENT
Start: 2024-01-01 | End: 2024-01-01

## 2024-01-01 RX ORDER — SOD PHOS DI, MONO/K PHOS MONO 250 MG
1 TABLET ORAL THREE TIMES A DAY
Refills: 0 | Status: COMPLETED | OUTPATIENT
Start: 2024-01-01 | End: 2024-01-01

## 2024-01-01 RX ORDER — PROPOFOL 10 MG/ML
50 INJECTION, EMULSION INTRAVENOUS ONCE
Refills: 0 | Status: COMPLETED | OUTPATIENT
Start: 2024-01-01 | End: 2024-01-01

## 2024-01-01 RX ORDER — LEVETIRACETAM 100 MG/ML
1000 INJECTION INTRAVENOUS ONCE
Refills: 0 | Status: DISCONTINUED | OUTPATIENT
Start: 2024-01-01 | End: 2024-01-01

## 2024-01-01 RX ORDER — HALOPERIDOL DECANOATE 100 MG/ML
5 VIAL (ML) INTRAMUSCULAR ONCE
Refills: 0 | Status: COMPLETED | OUTPATIENT
Start: 2024-01-01 | End: 2024-01-01

## 2024-01-01 RX ORDER — LORAZEPAM 0.5 MG
2 TABLET ORAL ONCE
Refills: 0 | Status: DISCONTINUED | OUTPATIENT
Start: 2024-01-01 | End: 2024-01-01

## 2024-01-01 RX ORDER — ENOXAPARIN SODIUM 100 MG/ML
40 INJECTION SUBCUTANEOUS EVERY 24 HOURS
Refills: 0 | Status: DISCONTINUED | OUTPATIENT
Start: 2024-01-01 | End: 2024-01-01

## 2024-01-01 RX ORDER — ONDANSETRON 8 MG/1
4 TABLET, FILM COATED ORAL ONCE
Refills: 0 | Status: COMPLETED | OUTPATIENT
Start: 2024-01-01 | End: 2024-01-01

## 2024-01-01 RX ORDER — MIDODRINE HYDROCHLORIDE 10 MG/1
10 TABLET ORAL EVERY 8 HOURS
Refills: 0 | Status: DISCONTINUED | OUTPATIENT
Start: 2024-01-01 | End: 2024-01-01

## 2024-01-01 RX ORDER — HALOPERIDOL DECANOATE 100 MG/ML
5 VIAL (ML) INTRAMUSCULAR EVERY 6 HOURS
Refills: 0 | Status: DISCONTINUED | OUTPATIENT
Start: 2024-01-01 | End: 2024-01-01

## 2024-01-01 RX ORDER — SODIUM CHLORIDE 0.9 % (FLUSH) 0.9 %
500 SYRINGE (ML) INJECTION ONCE
Refills: 0 | Status: COMPLETED | OUTPATIENT
Start: 2024-01-01 | End: 2024-01-01

## 2024-01-01 RX ORDER — ROCURONIUM BROMIDE 100 MG/10ML
100 INJECTION INTRAVENOUS ONCE
Refills: 0 | Status: COMPLETED | OUTPATIENT
Start: 2024-01-01 | End: 2024-01-01

## 2024-01-01 RX ORDER — SODIUM CHLORIDE 0.9 % (FLUSH) 0.9 %
1000 SYRINGE (ML) INJECTION ONCE
Refills: 0 | Status: COMPLETED | OUTPATIENT
Start: 2024-01-01 | End: 2024-01-01

## 2024-01-01 RX ORDER — CEFTRIAXONE SODIUM 500 MG
1000 VIAL (EA) INJECTION EVERY 24 HOURS
Refills: 0 | Status: DISCONTINUED | OUTPATIENT
Start: 2024-01-01 | End: 2024-01-01

## 2024-01-01 RX ORDER — DEXTROSE MONOHYDRATE AND SODIUM CHLORIDE 5; .3 G/100ML; G/100ML
2000 INJECTION, SOLUTION INTRAVENOUS ONCE
Refills: 0 | Status: COMPLETED | OUTPATIENT
Start: 2024-01-01 | End: 2024-01-01

## 2024-01-01 RX ORDER — MIDAZOLAM HYDROCHLORIDE 1 MG/ML
2 INJECTION INTRAMUSCULAR; INTRAVENOUS ONCE
Refills: 0 | Status: DISCONTINUED | OUTPATIENT
Start: 2024-01-01 | End: 2024-01-01

## 2024-01-01 RX ORDER — VANCOMYCIN HYDROCHLORIDE 50 MG/ML
1000 KIT ORAL EVERY 8 HOURS
Refills: 0 | Status: DISCONTINUED | OUTPATIENT
Start: 2024-01-01 | End: 2024-01-01

## 2024-01-01 RX ORDER — CASPOFUNGIN ACETATE 5 MG/ML
70 INJECTION, POWDER, LYOPHILIZED, FOR SOLUTION INTRAVENOUS ONCE
Refills: 0 | Status: COMPLETED | OUTPATIENT
Start: 2024-01-01 | End: 2024-01-01

## 2024-01-01 RX ORDER — CISATRACURIUM BESYLATE 10 MG/ML
3 INJECTION INTRAVENOUS
Qty: 200 | Refills: 0 | Status: DISCONTINUED | OUTPATIENT
Start: 2024-01-01 | End: 2024-01-01

## 2024-01-01 RX ORDER — PROPOFOL 10 MG/ML
5 INJECTION, EMULSION INTRAVENOUS
Qty: 1000 | Refills: 0 | Status: DISCONTINUED | OUTPATIENT
Start: 2024-01-01 | End: 2024-01-01

## 2024-01-01 RX ORDER — ACETAMINOPHEN 325 MG
650 TABLET ORAL EVERY 6 HOURS
Refills: 0 | Status: DISCONTINUED | OUTPATIENT
Start: 2024-01-01 | End: 2024-01-01

## 2024-01-01 RX ORDER — ETOMIDATE 2 MG/ML
30 INJECTION, SOLUTION INTRAVENOUS ONCE
Refills: 0 | Status: COMPLETED | OUTPATIENT
Start: 2024-01-01 | End: 2024-01-01

## 2024-01-01 RX ORDER — MAGNESIUM SULFATE 100 %
1 POWDER (GRAM) MISCELLANEOUS ONCE
Refills: 0 | Status: COMPLETED | OUTPATIENT
Start: 2024-01-01 | End: 2024-01-01

## 2024-01-01 RX ORDER — SODIUM CHLORIDE 9 MG/ML
1500 INJECTION INTRAMUSCULAR; INTRAVENOUS; SUBCUTANEOUS ONCE
Refills: 0 | Status: COMPLETED | OUTPATIENT
Start: 2024-01-01 | End: 2024-01-01

## 2024-01-01 RX ORDER — METHADONE HYDROCHLORIDE 40 MG/1
10 TABLET ORAL ONCE
Refills: 0 | Status: COMPLETED | OUTPATIENT
Start: 2024-01-01 | End: 2024-01-01

## 2024-01-01 RX ORDER — PANTOPRAZOLE SODIUM 40 MG/10ML
40 INJECTION, POWDER, FOR SOLUTION INTRAVENOUS EVERY 24 HOURS
Refills: 0 | Status: DISCONTINUED | OUTPATIENT
Start: 2024-01-01 | End: 2024-01-01

## 2024-01-01 RX ORDER — VASOPRESSIN 20 UNIT/ML
0.04 VIAL (ML) INJECTION
Qty: 40 | Refills: 0 | Status: DISCONTINUED | OUTPATIENT
Start: 2024-01-01 | End: 2024-06-24

## 2024-01-01 RX ORDER — MIDODRINE HYDROCHLORIDE 10 MG/1
5 TABLET ORAL EVERY 8 HOURS
Refills: 0 | Status: DISCONTINUED | OUTPATIENT
Start: 2024-01-01 | End: 2024-01-01

## 2024-01-01 RX ORDER — FOLIC ACID
1 POWDER (GRAM) MISCELLANEOUS DAILY
Refills: 0 | Status: DISCONTINUED | OUTPATIENT
Start: 2024-01-01 | End: 2024-01-01

## 2024-01-01 RX ORDER — VANCOMYCIN HYDROCHLORIDE 50 MG/ML
1250 KIT ORAL EVERY 8 HOURS
Refills: 0 | Status: DISCONTINUED | OUTPATIENT
Start: 2024-01-01 | End: 2024-06-24

## 2024-01-01 RX ORDER — DEXMEDETOMIDINE HYDROCHLORIDE 4 UG/ML
0.2 INJECTION, SOLUTION INTRAVENOUS
Qty: 400 | Refills: 0 | Status: DISCONTINUED | OUTPATIENT
Start: 2024-01-01 | End: 2024-01-01

## 2024-01-01 RX ORDER — DEXTROSE MONOHYDRATE AND SODIUM CHLORIDE 5; .3 G/100ML; G/100ML
1000 INJECTION, SOLUTION INTRAVENOUS
Refills: 0 | Status: COMPLETED | OUTPATIENT
Start: 2024-01-01 | End: 2024-01-01

## 2024-01-01 RX ORDER — LEVETIRACETAM 100 MG/ML
1000 INJECTION INTRAVENOUS EVERY 12 HOURS
Refills: 0 | Status: DISCONTINUED | OUTPATIENT
Start: 2024-01-01 | End: 2024-01-01

## 2024-01-01 RX ORDER — POLYETHYLENE GLYCOL 3350 1 G/G
17 POWDER ORAL DAILY
Refills: 0 | Status: DISCONTINUED | OUTPATIENT
Start: 2024-01-01 | End: 2024-01-01

## 2024-01-01 RX ORDER — FENTANYL CITRATE 50 UG/ML
100 INJECTION, SOLUTION INTRAMUSCULAR; INTRAVENOUS ONCE
Refills: 0 | Status: DISCONTINUED | OUTPATIENT
Start: 2024-01-01 | End: 2024-01-01

## 2024-01-01 RX ORDER — DIPHENHYDRAMINE HCL 50 MG
25 CAPSULE ORAL ONCE
Refills: 0 | Status: COMPLETED | OUTPATIENT
Start: 2024-01-01 | End: 2024-01-01

## 2024-01-01 RX ORDER — NOREPINEPHRINE BITARTRATE 1 MG/ML
0.05 INJECTION INTRAVENOUS
Qty: 8 | Refills: 0 | Status: DISCONTINUED | OUTPATIENT
Start: 2024-01-01 | End: 2024-01-01

## 2024-01-01 RX ORDER — DEXAMETHASONE 1 MG/1
6 TABLET ORAL EVERY 6 HOURS
Refills: 0 | Status: DISCONTINUED | OUTPATIENT
Start: 2024-01-01 | End: 2024-01-01

## 2024-01-01 RX ORDER — POTASSIUM CHLORIDE 600 MG/1
20 TABLET, FILM COATED, EXTENDED RELEASE ORAL
Refills: 0 | Status: COMPLETED | OUTPATIENT
Start: 2024-01-01 | End: 2024-01-01

## 2024-01-01 RX ORDER — BIOTIN/FOLIC AC/VIT BCOMP,C/ZN 3MG-0.8MG
15 TABLET ORAL DAILY
Refills: 0 | Status: DISCONTINUED | OUTPATIENT
Start: 2024-01-01 | End: 2024-01-01

## 2024-01-01 RX ORDER — DIAZEPAM 10 MG/1
5 TABLET ORAL ONCE
Refills: 0 | Status: DISCONTINUED | OUTPATIENT
Start: 2024-01-01 | End: 2024-01-01

## 2024-01-01 RX ORDER — MIDODRINE HYDROCHLORIDE 10 MG/1
10 TABLET ORAL ONCE
Refills: 0 | Status: COMPLETED | OUTPATIENT
Start: 2024-01-01 | End: 2024-01-01

## 2024-01-01 RX ORDER — PHENYLEPHRINE HYDROCHLORIDE 10 MG/ML
0.3 INJECTION INTRAVENOUS
Qty: 40 | Refills: 0 | Status: DISCONTINUED | OUTPATIENT
Start: 2024-01-01 | End: 2024-06-24

## 2024-01-01 RX ORDER — PHENOBARBITAL 15 MG/1
260 TABLET ORAL ONCE
Refills: 0 | Status: DISCONTINUED | OUTPATIENT
Start: 2024-01-01 | End: 2024-01-01

## 2024-01-01 RX ORDER — MIDAZOLAM HYDROCHLORIDE 1 MG/ML
2 INJECTION, SOLUTION INTRAMUSCULAR; INTRAVENOUS ONCE
Refills: 0 | Status: DISCONTINUED | OUTPATIENT
Start: 2024-01-01 | End: 2024-01-01

## 2024-01-01 RX ORDER — FENTANYL CITRATE 50 UG/ML
50 INJECTION, SOLUTION INTRAMUSCULAR; INTRAVENOUS ONCE
Refills: 0 | Status: DISCONTINUED | OUTPATIENT
Start: 2024-01-01 | End: 2024-01-01

## 2024-01-01 RX ORDER — DEXMEDETOMIDINE HYDROCHLORIDE 4 UG/ML
0.3 INJECTION, SOLUTION INTRAVENOUS
Qty: 400 | Refills: 0 | Status: DISCONTINUED | OUTPATIENT
Start: 2024-01-01 | End: 2024-01-01

## 2024-01-01 RX ORDER — DEXMEDETOMIDINE HYDROCHLORIDE 4 UG/ML
0.5 INJECTION, SOLUTION INTRAVENOUS
Qty: 200 | Refills: 0 | Status: DISCONTINUED | OUTPATIENT
Start: 2024-01-01 | End: 2024-01-01

## 2024-01-01 RX ORDER — POTASSIUM CHLORIDE 600 MG/1
40 TABLET, FILM COATED, EXTENDED RELEASE ORAL ONCE
Refills: 0 | Status: COMPLETED | OUTPATIENT
Start: 2024-01-01 | End: 2024-01-01

## 2024-01-01 RX ORDER — DEXAMETHASONE 1 MG/1
20 TABLET ORAL DAILY
Refills: 0 | Status: COMPLETED | OUTPATIENT
Start: 2024-01-01 | End: 2024-01-01

## 2024-01-01 RX ORDER — DEXTROSE MONOHYDRATE AND SODIUM CHLORIDE 5; .3 G/100ML; G/100ML
500 INJECTION, SOLUTION INTRAVENOUS
Refills: 0 | Status: DISCONTINUED | OUTPATIENT
Start: 2024-01-01 | End: 2024-01-01

## 2024-01-01 RX ORDER — FENTANYL CITRATE 50 UG/ML
0.5 INJECTION, SOLUTION INTRAMUSCULAR; INTRAVENOUS
Qty: 5000 | Refills: 0 | Status: DISCONTINUED | OUTPATIENT
Start: 2024-01-01 | End: 2024-01-01

## 2024-01-01 RX ORDER — ALBUMIN HUMAN 5 %
50 INTRAVENOUS SOLUTION INTRAVENOUS ONCE
Refills: 0 | Status: COMPLETED | OUTPATIENT
Start: 2024-01-01 | End: 2024-01-01

## 2024-01-01 RX ORDER — SODIUM CHLORIDE 9 MG/ML
3 INJECTION INTRAMUSCULAR; INTRAVENOUS; SUBCUTANEOUS ONCE
Refills: 0 | Status: COMPLETED | OUTPATIENT
Start: 2024-01-01 | End: 2024-01-01

## 2024-01-01 RX ORDER — HYDROMORPHONE HCL 0.2 MG/ML
1 INJECTION, SOLUTION INTRAVENOUS ONCE
Refills: 0 | Status: DISCONTINUED | OUTPATIENT
Start: 2024-01-01 | End: 2024-01-01

## 2024-01-01 RX ORDER — ROCURONIUM BROMIDE 100 MG/10ML
50 INJECTION INTRAVENOUS ONCE
Refills: 0 | Status: COMPLETED | OUTPATIENT
Start: 2024-01-01 | End: 2024-01-01

## 2024-01-01 RX ORDER — BUDESONIDE/FORMOTEROL FUMARATE 160-4.5MCG
2 HFA AEROSOL WITH ADAPTER (GRAM) INHALATION
Refills: 0 | Status: DISCONTINUED | OUTPATIENT
Start: 2024-01-01 | End: 2024-01-01

## 2024-01-01 RX ORDER — THIAMINE HCL 100 MG
100 TABLET ORAL DAILY
Refills: 0 | Status: DISCONTINUED | OUTPATIENT
Start: 2024-01-01 | End: 2024-01-01

## 2024-01-01 RX ORDER — KETAMINE HYDROCHLORIDE 100 MG/ML
150 INJECTION INTRAMUSCULAR; INTRAVENOUS ONCE
Refills: 0 | Status: DISCONTINUED | OUTPATIENT
Start: 2024-01-01 | End: 2024-01-01

## 2024-01-01 RX ORDER — CASPOFUNGIN ACETATE 5 MG/ML
50 INJECTION, POWDER, LYOPHILIZED, FOR SOLUTION INTRAVENOUS EVERY 24 HOURS
Refills: 0 | Status: DISCONTINUED | OUTPATIENT
Start: 2024-01-01 | End: 2024-01-01

## 2024-01-01 RX ORDER — LACTULOSE 10 G/15ML
20 SOLUTION ORAL EVERY 6 HOURS
Refills: 0 | Status: DISCONTINUED | OUTPATIENT
Start: 2024-01-01 | End: 2024-01-01

## 2024-01-01 RX ORDER — MIDAZOLAM HYDROCHLORIDE 1 MG/ML
4 INJECTION INTRAMUSCULAR; INTRAVENOUS ONCE
Refills: 0 | Status: DISCONTINUED | OUTPATIENT
Start: 2024-01-01 | End: 2024-01-01

## 2024-01-01 RX ORDER — NOREPINEPHRINE BITARTRATE 1 MG/ML
0.05 INJECTION INTRAVENOUS
Qty: 16 | Refills: 0 | Status: DISCONTINUED | OUTPATIENT
Start: 2024-01-01 | End: 2024-01-01

## 2024-01-01 RX ORDER — MUPIROCIN 20 MG/G
1 OINTMENT TOPICAL
Refills: 0 | Status: DISCONTINUED | OUTPATIENT
Start: 2024-01-01 | End: 2024-01-01

## 2024-01-01 RX ORDER — HYDRALAZINE HYDROCHLORIDE 50 MG/1
10 TABLET ORAL EVERY 4 HOURS
Refills: 0 | Status: DISCONTINUED | OUTPATIENT
Start: 2024-01-01 | End: 2024-01-01

## 2024-01-01 RX ORDER — POTASSIUM PHOSPHATE, MONOBASIC POTASSIUM PHOSPHATE, DIBASIC INJECTION, 236; 224 MG/ML; MG/ML
30 SOLUTION, CONCENTRATE INTRAVENOUS ONCE
Refills: 0 | Status: COMPLETED | OUTPATIENT
Start: 2024-01-01 | End: 2024-01-01

## 2024-01-01 RX ORDER — VANCOMYCIN HYDROCHLORIDE 50 MG/ML
KIT ORAL
Refills: 0 | Status: DISCONTINUED | OUTPATIENT
Start: 2024-01-01 | End: 2024-01-01

## 2024-01-01 RX ORDER — ACETYLCYSTEINE 200 MG/ML
4 SOLUTION ORAL; RESPIRATORY (INHALATION) EVERY 6 HOURS
Refills: 0 | Status: DISCONTINUED | OUTPATIENT
Start: 2024-01-01 | End: 2024-01-01

## 2024-01-01 RX ORDER — METHYLPREDNISOLONE ACETATE 20 MG/ML
40 VIAL (ML) INJECTION EVERY 8 HOURS
Refills: 0 | Status: DISCONTINUED | OUTPATIENT
Start: 2024-01-01 | End: 2024-01-01

## 2024-01-01 RX ORDER — DEXTROSE MONOHYDRATE AND SODIUM CHLORIDE 5; .3 G/100ML; G/100ML
500 INJECTION, SOLUTION INTRAVENOUS ONCE
Refills: 0 | Status: COMPLETED | OUTPATIENT
Start: 2024-01-01 | End: 2024-01-01

## 2024-01-01 RX ORDER — MIDAZOLAM HYDROCHLORIDE 1 MG/ML
6 INJECTION, SOLUTION INTRAMUSCULAR; INTRAVENOUS ONCE
Refills: 0 | Status: DISCONTINUED | OUTPATIENT
Start: 2024-01-01 | End: 2024-01-01

## 2024-01-01 RX ORDER — MEROPENEM 500 MG/1
2 INJECTION, POWDER, FOR SOLUTION INTRAVENOUS EVERY 8 HOURS
Refills: 0 | Status: DISCONTINUED | OUTPATIENT
Start: 2024-01-01 | End: 2024-01-01

## 2024-01-01 RX ORDER — ONDANSETRON HYDROCHLORIDE 2 MG/ML
4 INJECTION INTRAMUSCULAR; INTRAVENOUS ONCE
Refills: 0 | Status: COMPLETED | OUTPATIENT
Start: 2024-01-01 | End: 2024-01-01

## 2024-01-01 RX ORDER — LEVETIRACETAM 100 MG/ML
500 INJECTION INTRAVENOUS EVERY 12 HOURS
Refills: 0 | Status: DISCONTINUED | OUTPATIENT
Start: 2024-01-01 | End: 2024-01-01

## 2024-01-01 RX ORDER — FAMOTIDINE 10 MG/ML
20 INJECTION INTRAVENOUS ONCE
Refills: 0 | Status: DISCONTINUED | OUTPATIENT
Start: 2024-01-01 | End: 2024-01-01

## 2024-01-01 RX ORDER — POTASSIUM CHLORIDE 600 MG/1
20 TABLET, FILM COATED, EXTENDED RELEASE ORAL
Refills: 0 | Status: DISCONTINUED | OUTPATIENT
Start: 2024-01-01 | End: 2024-01-01

## 2024-01-01 RX ORDER — PROPOFOL 10 MG/ML
5.01 INJECTION, EMULSION INTRAVENOUS
Qty: 1000 | Refills: 0 | Status: DISCONTINUED | OUTPATIENT
Start: 2024-01-01 | End: 2024-01-01

## 2024-01-01 RX ORDER — THIAMINE HCL 100 MG
500 TABLET ORAL EVERY 8 HOURS
Refills: 0 | Status: COMPLETED | OUTPATIENT
Start: 2024-01-01 | End: 2024-01-01

## 2024-01-01 RX ORDER — ACYCLOVIR SODIUM 50 MG/ML
750 VIAL (ML) INTRAVENOUS EVERY 8 HOURS
Refills: 0 | Status: DISCONTINUED | OUTPATIENT
Start: 2024-01-01 | End: 2024-01-01

## 2024-01-01 RX ORDER — BIOTIN/FOLIC AC/VIT BCOMP,C/ZN 3MG-0.8MG
15 TABLET ORAL DAILY
Refills: 0 | Status: DISCONTINUED | OUTPATIENT
Start: 2024-01-01 | End: 2024-06-24

## 2024-01-01 RX ORDER — IPRATROPIUM BROMIDE AND ALBUTEROL SULFATE .5; 3 MG/3ML; MG/3ML
3 SOLUTION RESPIRATORY (INHALATION) EVERY 6 HOURS
Refills: 0 | Status: DISCONTINUED | OUTPATIENT
Start: 2024-01-01 | End: 2024-01-01

## 2024-01-01 RX ORDER — CASPOFUNGIN ACETATE 5 MG/ML
INJECTION, POWDER, LYOPHILIZED, FOR SOLUTION INTRAVENOUS
Refills: 0 | Status: DISCONTINUED | OUTPATIENT
Start: 2024-01-01 | End: 2024-01-01

## 2024-01-01 RX ORDER — MANNITOL 20 %
70 INTRAVENOUS SOLUTION INTRAVENOUS ONCE
Refills: 0 | Status: COMPLETED | OUTPATIENT
Start: 2024-01-01 | End: 2024-01-01

## 2024-01-01 RX ORDER — FOLIC ACID
1 POWDER (GRAM) MISCELLANEOUS DAILY
Refills: 0 | Status: DISCONTINUED | OUTPATIENT
Start: 2024-01-01 | End: 2024-06-24

## 2024-01-01 RX ADMIN — FENTANYL CITRATE 3.63 MICROGRAM(S)/KG/HR: 50 INJECTION, SOLUTION INTRAMUSCULAR; INTRAVENOUS at 23:44

## 2024-01-01 RX ADMIN — FENTANYL CITRATE 3.63 MICROGRAM(S)/KG/HR: 50 INJECTION, SOLUTION INTRAMUSCULAR; INTRAVENOUS at 12:23

## 2024-01-01 RX ADMIN — PANTOPRAZOLE SODIUM 40 MILLIGRAM(S): 40 INJECTION, POWDER, FOR SOLUTION INTRAVENOUS at 07:56

## 2024-01-01 RX ADMIN — Medication 650 MILLIGRAM(S): at 01:04

## 2024-01-01 RX ADMIN — Medication 25 GRAM(S): at 18:48

## 2024-01-01 RX ADMIN — DEXTROSE MONOHYDRATE AND SODIUM CHLORIDE 100 MILLILITER(S): 5; .3 INJECTION, SOLUTION INTRAVENOUS at 17:45

## 2024-01-01 RX ADMIN — PANTOPRAZOLE SODIUM 40 MILLIGRAM(S): 40 INJECTION, POWDER, FOR SOLUTION INTRAVENOUS at 09:30

## 2024-01-01 RX ADMIN — Medication 1400 GRAM(S): at 02:05

## 2024-01-01 RX ADMIN — DEXTROSE MONOHYDRATE AND SODIUM CHLORIDE 1000 MILLILITER(S): 5; .3 INJECTION, SOLUTION INTRAVENOUS at 21:13

## 2024-01-01 RX ADMIN — VALPROIC ACID 55 MILLIGRAM(S): 250 SOLUTION ORAL at 21:43

## 2024-01-01 RX ADMIN — Medication 25 MILLIGRAM(S): at 05:44

## 2024-01-01 RX ADMIN — HYDROMORPHONE HCL 2 MILLIGRAM(S): 0.2 INJECTION, SOLUTION INTRAVENOUS at 10:28

## 2024-01-01 RX ADMIN — FENTANYL CITRATE 100 MICROGRAM(S): 50 INJECTION, SOLUTION INTRAMUSCULAR; INTRAVENOUS at 01:11

## 2024-01-01 RX ADMIN — NOREPINEPHRINE BITARTRATE 6.8 MICROGRAM(S)/KG/MIN: 1 INJECTION INTRAVENOUS at 18:48

## 2024-01-01 RX ADMIN — Medication 1 APPLICATION(S): at 07:04

## 2024-01-01 RX ADMIN — Medication 25 GRAM(S): at 23:29

## 2024-01-01 RX ADMIN — PHENYLEPHRINE HYDROCHLORIDE 8.17 MICROGRAM(S)/KG/MIN: 10 INJECTION INTRAVENOUS at 02:06

## 2024-01-01 RX ADMIN — POLYETHYLENE GLYCOL 3350 17 GRAM(S): 1 POWDER ORAL at 11:30

## 2024-01-01 RX ADMIN — Medication 100 MILLIGRAM(S): at 11:13

## 2024-01-01 RX ADMIN — Medication 105 MILLIGRAM(S): at 05:43

## 2024-01-01 RX ADMIN — IPRATROPIUM BROMIDE AND ALBUTEROL SULFATE 3 MILLILITER(S): .5; 3 SOLUTION RESPIRATORY (INHALATION) at 13:38

## 2024-01-01 RX ADMIN — Medication 30 MILLIGRAM(S): at 06:03

## 2024-01-01 RX ADMIN — MIDAZOLAM HYDROCHLORIDE 1.45 MG/KG/HR: 1 INJECTION INTRAMUSCULAR; INTRAVENOUS at 10:12

## 2024-01-01 RX ADMIN — Medication 1 PACKET(S): at 06:02

## 2024-01-01 RX ADMIN — MIDODRINE HYDROCHLORIDE 5 MILLIGRAM(S): 10 TABLET ORAL at 14:49

## 2024-01-01 RX ADMIN — PANTOPRAZOLE SODIUM 40 MILLIGRAM(S): 40 INJECTION, POWDER, FOR SOLUTION INTRAVENOUS at 12:50

## 2024-01-01 RX ADMIN — PHENYLEPHRINE HYDROCHLORIDE 8.17 MICROGRAM(S)/KG/MIN: 10 INJECTION INTRAVENOUS at 23:12

## 2024-01-01 RX ADMIN — MIDAZOLAM HYDROCHLORIDE 1.45 MG/KG/HR: 1 INJECTION INTRAMUSCULAR; INTRAVENOUS at 19:29

## 2024-01-01 RX ADMIN — Medication 1000 MILLIGRAM(S): at 07:54

## 2024-01-01 RX ADMIN — FENTANYL CITRATE 3.63 MICROGRAM(S)/KG/HR: 50 INJECTION, SOLUTION INTRAMUSCULAR; INTRAVENOUS at 00:23

## 2024-01-01 RX ADMIN — FENTANYL CITRATE 3.63 MICROGRAM(S)/KG/HR: 50 INJECTION, SOLUTION INTRAMUSCULAR; INTRAVENOUS at 07:30

## 2024-01-01 RX ADMIN — Medication 15 MILLILITER(S): at 05:14

## 2024-01-01 RX ADMIN — ACETYLCYSTEINE 4 MILLILITER(S): 200 SOLUTION ORAL; RESPIRATORY (INHALATION) at 08:13

## 2024-01-01 RX ADMIN — Medication 1 APPLICATION(S): at 05:14

## 2024-01-01 RX ADMIN — Medication 25 GRAM(S): at 08:42

## 2024-01-01 RX ADMIN — IPRATROPIUM BROMIDE AND ALBUTEROL SULFATE 3 MILLILITER(S): .5; 3 SOLUTION RESPIRATORY (INHALATION) at 04:03

## 2024-01-01 RX ADMIN — POTASSIUM CHLORIDE 40 MILLIEQUIVALENT(S): 600 TABLET, FILM COATED, EXTENDED RELEASE ORAL at 08:43

## 2024-01-01 RX ADMIN — VANCOMYCIN HYDROCHLORIDE 166.67 MILLIGRAM(S): KIT at 02:55

## 2024-01-01 RX ADMIN — PROPOFOL 8.71 MICROGRAM(S)/KG/MIN: 10 INJECTION, EMULSION INTRAVENOUS at 04:19

## 2024-01-01 RX ADMIN — Medication 15 MILLILITER(S): at 17:04

## 2024-01-01 RX ADMIN — FENTANYL CITRATE 3.63 MICROGRAM(S)/KG/HR: 50 INJECTION, SOLUTION INTRAMUSCULAR; INTRAVENOUS at 20:24

## 2024-01-01 RX ADMIN — VANCOMYCIN HYDROCHLORIDE 250 MILLIGRAM(S): KIT at 12:35

## 2024-01-01 RX ADMIN — DIAZEPAM 5 MILLIGRAM(S): 10 TABLET ORAL at 18:48

## 2024-01-01 RX ADMIN — Medication 1 PACKET(S): at 15:38

## 2024-01-01 RX ADMIN — PHENYLEPHRINE HYDROCHLORIDE 8.17 MICROGRAM(S)/KG/MIN: 10 INJECTION INTRAVENOUS at 05:20

## 2024-01-01 RX ADMIN — MIDAZOLAM HYDROCHLORIDE 1.45 MG/KG/HR: 1 INJECTION INTRAMUSCULAR; INTRAVENOUS at 19:47

## 2024-01-01 RX ADMIN — DEXMEDETOMIDINE HYDROCHLORIDE 9.07 MICROGRAM(S)/KG/HR: 4 INJECTION, SOLUTION INTRAVENOUS at 20:21

## 2024-01-01 RX ADMIN — VANCOMYCIN HYDROCHLORIDE 166.67 MILLIGRAM(S): KIT at 05:52

## 2024-01-01 RX ADMIN — MIDAZOLAM HYDROCHLORIDE 2 MILLIGRAM(S): 1 INJECTION, SOLUTION INTRAMUSCULAR; INTRAVENOUS at 08:15

## 2024-01-01 RX ADMIN — DEXMEDETOMIDINE HYDROCHLORIDE 9.07 MICROGRAM(S)/KG/HR: 4 INJECTION, SOLUTION INTRAVENOUS at 09:32

## 2024-01-01 RX ADMIN — POTASSIUM PHOSPHATE, MONOBASIC POTASSIUM PHOSPHATE, DIBASIC INJECTION, 83.33 MILLIMOLE(S): 236; 224 SOLUTION, CONCENTRATE INTRAVENOUS at 00:50

## 2024-01-01 RX ADMIN — Medication 105 MILLIGRAM(S): at 06:21

## 2024-01-01 RX ADMIN — Medication 1 PACKET(S): at 22:12

## 2024-01-01 RX ADMIN — PIPERACILLIN SODIUM AND TAZOBACTAM SODIUM 25 GRAM(S): 3; .375 INJECTION, POWDER, LYOPHILIZED, FOR SOLUTION INTRAVENOUS at 08:43

## 2024-01-01 RX ADMIN — DEXAMETHASONE 110 MILLIGRAM(S): 1 TABLET ORAL at 10:12

## 2024-01-01 RX ADMIN — CISATRACURIUM BESYLATE 13.1 MICROGRAM(S)/KG/MIN: 10 INJECTION INTRAVENOUS at 16:14

## 2024-01-01 RX ADMIN — DEXAMETHASONE 110 MILLIGRAM(S): 1 TABLET ORAL at 12:37

## 2024-01-01 RX ADMIN — LEVETIRACETAM 500 MILLIGRAM(S): 100 INJECTION INTRAVENOUS at 02:07

## 2024-01-01 RX ADMIN — Medication 15 MILLILITER(S): at 17:06

## 2024-01-01 RX ADMIN — Medication 650 MILLIGRAM(S): at 22:55

## 2024-01-01 RX ADMIN — PROPOFOL 2.18 MICROGRAM(S)/KG/MIN: 10 INJECTION, EMULSION INTRAVENOUS at 16:13

## 2024-01-01 RX ADMIN — POTASSIUM CHLORIDE 100 MILLIEQUIVALENT(S): 600 TABLET, FILM COATED, EXTENDED RELEASE ORAL at 01:27

## 2024-01-01 RX ADMIN — Medication 25 GRAM(S): at 08:43

## 2024-01-01 RX ADMIN — MEROPENEM 1000 MILLIGRAM(S): 500 INJECTION, POWDER, FOR SOLUTION INTRAVENOUS at 05:13

## 2024-01-01 RX ADMIN — VANCOMYCIN HYDROCHLORIDE 166.67 MILLIGRAM(S): KIT at 23:58

## 2024-01-01 RX ADMIN — LACTULOSE 20 GRAM(S): 10 SOLUTION ORAL at 05:14

## 2024-01-01 RX ADMIN — PROPOFOL 8.71 MICROGRAM(S)/KG/MIN: 10 INJECTION, EMULSION INTRAVENOUS at 22:13

## 2024-01-01 RX ADMIN — Medication 6 UNIT(S)/MIN: at 02:06

## 2024-01-01 RX ADMIN — FENTANYL CITRATE 100 MICROGRAM(S): 50 INJECTION, SOLUTION INTRAMUSCULAR; INTRAVENOUS at 22:37

## 2024-01-01 RX ADMIN — MIDAZOLAM HYDROCHLORIDE 4 MILLIGRAM(S): 1 INJECTION INTRAMUSCULAR; INTRAVENOUS at 10:28

## 2024-01-01 RX ADMIN — ENOXAPARIN SODIUM 40 MILLIGRAM(S): 100 INJECTION SUBCUTANEOUS at 10:14

## 2024-01-01 RX ADMIN — Medication 1 APPLICATION(S): at 05:44

## 2024-01-01 RX ADMIN — PROPOFOL 2.18 MICROGRAM(S)/KG/MIN: 10 INJECTION, EMULSION INTRAVENOUS at 19:47

## 2024-01-01 RX ADMIN — NOREPINEPHRINE BITARTRATE 6.8 MICROGRAM(S)/KG/MIN: 1 INJECTION INTRAVENOUS at 17:32

## 2024-01-01 RX ADMIN — Medication 1 MILLIGRAM(S): at 11:40

## 2024-01-01 RX ADMIN — Medication 15 MILLILITER(S): at 06:21

## 2024-01-01 RX ADMIN — Medication 15 MILLILITER(S): at 17:07

## 2024-01-01 RX ADMIN — MEROPENEM 1000 MILLIGRAM(S): 500 INJECTION, POWDER, FOR SOLUTION INTRAVENOUS at 21:43

## 2024-01-01 RX ADMIN — FENTANYL CITRATE 3.63 MICROGRAM(S)/KG/HR: 50 INJECTION, SOLUTION INTRAMUSCULAR; INTRAVENOUS at 15:02

## 2024-01-01 RX ADMIN — PROPOFOL 2.18 MICROGRAM(S)/KG/MIN: 10 INJECTION, EMULSION INTRAVENOUS at 03:37

## 2024-01-01 RX ADMIN — Medication 15 MILLILITER(S): at 12:14

## 2024-01-01 RX ADMIN — PHENYLEPHRINE HYDROCHLORIDE 8.17 MICROGRAM(S)/KG/MIN: 10 INJECTION INTRAVENOUS at 19:59

## 2024-01-01 RX ADMIN — Medication 1000 MILLILITER(S): at 10:12

## 2024-01-01 RX ADMIN — ENOXAPARIN SODIUM 40 MILLIGRAM(S): 100 INJECTION SUBCUTANEOUS at 13:35

## 2024-01-01 RX ADMIN — POTASSIUM CHLORIDE 100 MILLIEQUIVALENT(S): 600 TABLET, FILM COATED, EXTENDED RELEASE ORAL at 07:28

## 2024-01-01 RX ADMIN — DEXAMETHASONE 110 MILLIGRAM(S): 1 TABLET ORAL at 05:13

## 2024-01-01 RX ADMIN — PIPERACILLIN SODIUM AND TAZOBACTAM SODIUM 25 GRAM(S): 3; .375 INJECTION, POWDER, LYOPHILIZED, FOR SOLUTION INTRAVENOUS at 05:52

## 2024-01-01 RX ADMIN — LEVETIRACETAM 500 MILLIGRAM(S): 100 INJECTION INTRAVENOUS at 17:55

## 2024-01-01 RX ADMIN — ONDANSETRON 4 MILLIGRAM(S): 8 TABLET, FILM COATED ORAL at 08:15

## 2024-01-01 RX ADMIN — Medication 650 MILLIGRAM(S): at 11:03

## 2024-01-01 RX ADMIN — PROPOFOL 2.18 MICROGRAM(S)/KG/MIN: 10 INJECTION, EMULSION INTRAVENOUS at 03:34

## 2024-01-01 RX ADMIN — MIDODRINE HYDROCHLORIDE 5 MILLIGRAM(S): 10 TABLET ORAL at 14:08

## 2024-01-01 RX ADMIN — DEXTROSE MONOHYDRATE AND SODIUM CHLORIDE 100 MILLILITER(S): 5; .3 INJECTION, SOLUTION INTRAVENOUS at 08:33

## 2024-01-01 RX ADMIN — Medication 15 MILLILITER(S): at 13:46

## 2024-01-01 RX ADMIN — Medication 25 MILLIGRAM(S): at 20:21

## 2024-01-01 RX ADMIN — Medication 1 PACKET(S): at 05:09

## 2024-01-01 RX ADMIN — SODIUM CHLORIDE 3 MILLILITER(S): 9 INJECTION INTRAMUSCULAR; INTRAVENOUS; SUBCUTANEOUS at 07:19

## 2024-01-01 RX ADMIN — DIAZEPAM 10 MILLIGRAM(S): 10 TABLET ORAL at 09:40

## 2024-01-01 RX ADMIN — PANTOPRAZOLE SODIUM 40 MILLIGRAM(S): 40 INJECTION, POWDER, FOR SOLUTION INTRAVENOUS at 12:26

## 2024-01-01 RX ADMIN — ACETYLCYSTEINE 4 MILLILITER(S): 200 SOLUTION ORAL; RESPIRATORY (INHALATION) at 08:28

## 2024-01-01 RX ADMIN — ENOXAPARIN SODIUM 40 MILLIGRAM(S): 100 INJECTION SUBCUTANEOUS at 08:30

## 2024-01-01 RX ADMIN — VANCOMYCIN HYDROCHLORIDE 166.67 MILLIGRAM(S): KIT at 00:52

## 2024-01-01 RX ADMIN — POTASSIUM CHLORIDE 50 MILLIEQUIVALENT(S): 600 TABLET, FILM COATED, EXTENDED RELEASE ORAL at 08:43

## 2024-01-01 RX ADMIN — ACETYLCYSTEINE 4 MILLILITER(S): 200 SOLUTION ORAL; RESPIRATORY (INHALATION) at 21:19

## 2024-01-01 RX ADMIN — Medication 2 PUFF(S): at 20:36

## 2024-01-01 RX ADMIN — PANTOPRAZOLE SODIUM 40 MILLIGRAM(S): 40 INJECTION, POWDER, FOR SOLUTION INTRAVENOUS at 08:31

## 2024-01-01 RX ADMIN — DEXTROSE MONOHYDRATE AND SODIUM CHLORIDE 100 MILLILITER(S): 5; .3 INJECTION, SOLUTION INTRAVENOUS at 22:38

## 2024-01-01 RX ADMIN — Medication 105 MILLIGRAM(S): at 07:56

## 2024-01-01 RX ADMIN — VANCOMYCIN HYDROCHLORIDE 166.67 MILLIGRAM(S): KIT at 18:00

## 2024-01-01 RX ADMIN — PROPOFOL 2.18 MICROGRAM(S)/KG/MIN: 10 INJECTION, EMULSION INTRAVENOUS at 12:23

## 2024-01-01 RX ADMIN — Medication 105 MILLIGRAM(S): at 13:06

## 2024-01-01 RX ADMIN — Medication 50 MILLIGRAM(S): at 16:40

## 2024-01-01 RX ADMIN — Medication 1 APPLICATION(S): at 08:16

## 2024-01-01 RX ADMIN — Medication 1000 MILLIGRAM(S): at 19:59

## 2024-01-01 RX ADMIN — MIDAZOLAM HYDROCHLORIDE 1.45 MG/KG/HR: 1 INJECTION INTRAMUSCULAR; INTRAVENOUS at 20:38

## 2024-01-01 RX ADMIN — Medication 15 MILLILITER(S): at 05:59

## 2024-01-01 RX ADMIN — FENTANYL CITRATE 3.63 MICROGRAM(S)/KG/HR: 50 INJECTION, SOLUTION INTRAMUSCULAR; INTRAVENOUS at 06:18

## 2024-01-01 RX ADMIN — Medication 400 MILLIGRAM(S): at 09:49

## 2024-01-01 RX ADMIN — MIDAZOLAM HYDROCHLORIDE 1.45 MG/KG/HR: 1 INJECTION INTRAMUSCULAR; INTRAVENOUS at 04:16

## 2024-01-01 RX ADMIN — PROPOFOL 2.18 MICROGRAM(S)/KG/MIN: 10 INJECTION, EMULSION INTRAVENOUS at 19:55

## 2024-01-01 RX ADMIN — POTASSIUM CHLORIDE 100 MILLIEQUIVALENT(S): 600 TABLET, FILM COATED, EXTENDED RELEASE ORAL at 03:59

## 2024-01-01 RX ADMIN — DEXTROSE MONOHYDRATE AND SODIUM CHLORIDE 100 MILLILITER(S): 5; .3 INJECTION, SOLUTION INTRAVENOUS at 19:48

## 2024-01-01 RX ADMIN — Medication 1 PACKET(S): at 15:42

## 2024-01-01 RX ADMIN — FENTANYL CITRATE 3.63 MICROGRAM(S)/KG/HR: 50 INJECTION, SOLUTION INTRAMUSCULAR; INTRAVENOUS at 19:47

## 2024-01-01 RX ADMIN — ROCURONIUM BROMIDE 100 MILLIGRAM(S): 100 INJECTION INTRAVENOUS at 22:37

## 2024-01-01 RX ADMIN — HYDROMORPHONE HCL 1 MILLIGRAM(S): 0.2 INJECTION, SOLUTION INTRAVENOUS at 00:03

## 2024-01-01 RX ADMIN — VALPROIC ACID 55 MILLIGRAM(S): 250 SOLUTION ORAL at 21:30

## 2024-01-01 RX ADMIN — ROCURONIUM BROMIDE 50 MILLIGRAM(S): 100 INJECTION INTRAVENOUS at 03:03

## 2024-01-01 RX ADMIN — Medication 1000 MILLIGRAM(S): at 03:32

## 2024-01-01 RX ADMIN — FENTANYL CITRATE 3.63 MICROGRAM(S)/KG/HR: 50 INJECTION, SOLUTION INTRAMUSCULAR; INTRAVENOUS at 18:38

## 2024-01-01 RX ADMIN — Medication 5 MILLIGRAM(S): at 23:41

## 2024-01-01 RX ADMIN — DEXTROSE MONOHYDRATE AND SODIUM CHLORIDE 1000 MILLILITER(S): 5; .3 INJECTION, SOLUTION INTRAVENOUS at 17:08

## 2024-01-01 RX ADMIN — Medication 1 APPLICATION(S): at 17:21

## 2024-01-01 RX ADMIN — Medication 25 MILLIGRAM(S): at 11:31

## 2024-01-01 RX ADMIN — DIAZEPAM 10 MILLIGRAM(S): 10 TABLET ORAL at 05:55

## 2024-01-01 RX ADMIN — MIDODRINE HYDROCHLORIDE 10 MILLIGRAM(S): 10 TABLET ORAL at 21:20

## 2024-01-01 RX ADMIN — DEXTROSE MONOHYDRATE AND SODIUM CHLORIDE 500 MILLILITER(S): 5; .3 INJECTION, SOLUTION INTRAVENOUS at 04:34

## 2024-01-01 RX ADMIN — PANTOPRAZOLE SODIUM 40 MILLIGRAM(S): 40 INJECTION, POWDER, FOR SOLUTION INTRAVENOUS at 13:35

## 2024-01-01 RX ADMIN — Medication 650 MILLIGRAM(S): at 21:45

## 2024-01-01 RX ADMIN — Medication 50 MILLILITER(S): at 16:06

## 2024-01-01 RX ADMIN — Medication 1 APPLICATION(S): at 17:56

## 2024-01-01 RX ADMIN — HYDROMORPHONE HCL 1 MILLIGRAM(S): 0.2 INJECTION, SOLUTION INTRAVENOUS at 21:13

## 2024-01-01 RX ADMIN — Medication 15 MILLILITER(S): at 17:33

## 2024-01-01 RX ADMIN — Medication 30 MILLIGRAM(S): at 15:41

## 2024-01-01 RX ADMIN — Medication 15 MILLILITER(S): at 16:50

## 2024-01-01 RX ADMIN — Medication 20 MILLIGRAM(S): at 18:52

## 2024-01-01 RX ADMIN — Medication 100 MILLIGRAM(S): at 11:58

## 2024-01-01 RX ADMIN — DEXTROSE MONOHYDRATE AND SODIUM CHLORIDE 500 MILLILITER(S): 5; .3 INJECTION, SOLUTION INTRAVENOUS at 22:05

## 2024-01-01 RX ADMIN — NOREPINEPHRINE BITARTRATE 6.8 MICROGRAM(S)/KG/MIN: 1 INJECTION INTRAVENOUS at 22:38

## 2024-01-01 RX ADMIN — Medication 15 MILLILITER(S): at 05:09

## 2024-01-01 RX ADMIN — ENOXAPARIN SODIUM 40 MILLIGRAM(S): 100 INJECTION SUBCUTANEOUS at 09:29

## 2024-01-01 RX ADMIN — MIDAZOLAM HYDROCHLORIDE 2 MILLIGRAM(S): 1 INJECTION INTRAMUSCULAR; INTRAVENOUS at 16:40

## 2024-01-01 RX ADMIN — FENTANYL CITRATE 50 MICROGRAM(S): 50 INJECTION, SOLUTION INTRAMUSCULAR; INTRAVENOUS at 07:07

## 2024-01-01 RX ADMIN — IPRATROPIUM BROMIDE AND ALBUTEROL SULFATE 3 MILLILITER(S): .5; 3 SOLUTION RESPIRATORY (INHALATION) at 20:10

## 2024-01-01 RX ADMIN — PROPOFOL 2.18 MICROGRAM(S)/KG/MIN: 10 INJECTION, EMULSION INTRAVENOUS at 20:24

## 2024-01-01 RX ADMIN — Medication 105 MILLIGRAM(S): at 21:30

## 2024-01-01 RX ADMIN — Medication 650 MILLIGRAM(S): at 12:15

## 2024-01-01 RX ADMIN — FENTANYL CITRATE 3.63 MICROGRAM(S)/KG/HR: 50 INJECTION, SOLUTION INTRAMUSCULAR; INTRAVENOUS at 19:29

## 2024-01-01 RX ADMIN — POTASSIUM CHLORIDE 100 MILLIEQUIVALENT(S): 600 TABLET, FILM COATED, EXTENDED RELEASE ORAL at 07:53

## 2024-01-01 RX ADMIN — MIDODRINE HYDROCHLORIDE 5 MILLIGRAM(S): 10 TABLET ORAL at 21:45

## 2024-01-01 RX ADMIN — Medication 1 PACKET(S): at 21:13

## 2024-01-01 RX ADMIN — Medication 15 MILLILITER(S): at 17:21

## 2024-01-01 RX ADMIN — Medication 25 GRAM(S): at 18:53

## 2024-01-01 RX ADMIN — MIDAZOLAM HYDROCHLORIDE 1.45 MG/KG/HR: 1 INJECTION INTRAMUSCULAR; INTRAVENOUS at 22:59

## 2024-01-01 RX ADMIN — PANTOPRAZOLE SODIUM 40 MILLIGRAM(S): 40 INJECTION, POWDER, FOR SOLUTION INTRAVENOUS at 10:12

## 2024-01-01 RX ADMIN — IPRATROPIUM BROMIDE AND ALBUTEROL SULFATE 3 MILLILITER(S): .5; 3 SOLUTION RESPIRATORY (INHALATION) at 20:17

## 2024-01-01 RX ADMIN — Medication 1 APPLICATION(S): at 05:12

## 2024-01-01 RX ADMIN — Medication 15 MILLILITER(S): at 12:27

## 2024-01-01 RX ADMIN — DEXTROSE MONOHYDRATE AND SODIUM CHLORIDE 4000 MILLILITER(S): 5; .3 INJECTION, SOLUTION INTRAVENOUS at 10:15

## 2024-01-01 RX ADMIN — DEXAMETHASONE 110 MILLIGRAM(S): 1 TABLET ORAL at 13:35

## 2024-01-01 RX ADMIN — PROPOFOL 8.71 MICROGRAM(S)/KG/MIN: 10 INJECTION, EMULSION INTRAVENOUS at 06:52

## 2024-01-01 RX ADMIN — ACETYLCYSTEINE 4 MILLILITER(S): 200 SOLUTION ORAL; RESPIRATORY (INHALATION) at 04:20

## 2024-01-01 RX ADMIN — ACETYLCYSTEINE 4 MILLILITER(S): 200 SOLUTION ORAL; RESPIRATORY (INHALATION) at 20:10

## 2024-01-01 RX ADMIN — CISATRACURIUM BESYLATE 13.1 MICROGRAM(S)/KG/MIN: 10 INJECTION INTRAVENOUS at 03:20

## 2024-01-01 RX ADMIN — Medication 1000 MILLIGRAM(S): at 16:51

## 2024-01-01 RX ADMIN — ACETYLCYSTEINE 4 MILLILITER(S): 200 SOLUTION ORAL; RESPIRATORY (INHALATION) at 14:25

## 2024-01-01 RX ADMIN — PHENYLEPHRINE HYDROCHLORIDE 8.17 MICROGRAM(S)/KG/MIN: 10 INJECTION INTRAVENOUS at 13:02

## 2024-01-01 RX ADMIN — KETAMINE HYDROCHLORIDE 150 MILLIGRAM(S): 100 INJECTION INTRAMUSCULAR; INTRAVENOUS at 06:40

## 2024-01-01 RX ADMIN — Medication 650 MILLIGRAM(S): at 11:41

## 2024-01-01 RX ADMIN — ACETYLCYSTEINE 4 MILLILITER(S): 200 SOLUTION ORAL; RESPIRATORY (INHALATION) at 20:17

## 2024-01-01 RX ADMIN — DIAZEPAM 10 MILLIGRAM(S): 10 TABLET ORAL at 00:17

## 2024-01-01 RX ADMIN — POLYETHYLENE GLYCOL 3350 17 GRAM(S): 1 POWDER ORAL at 11:41

## 2024-01-01 RX ADMIN — DIAZEPAM 10 MILLIGRAM(S): 10 TABLET ORAL at 11:13

## 2024-01-01 RX ADMIN — PROPOFOL 8.71 MICROGRAM(S)/KG/MIN: 10 INJECTION, EMULSION INTRAVENOUS at 12:51

## 2024-01-01 RX ADMIN — Medication 1 APPLICATION(S): at 05:55

## 2024-01-01 RX ADMIN — Medication 1 APPLICATION(S): at 05:53

## 2024-01-01 RX ADMIN — MIDAZOLAM HYDROCHLORIDE 1.45 MG/KG/HR: 1 INJECTION INTRAMUSCULAR; INTRAVENOUS at 13:10

## 2024-01-01 RX ADMIN — ENOXAPARIN SODIUM 40 MILLIGRAM(S): 100 INJECTION SUBCUTANEOUS at 09:39

## 2024-01-01 RX ADMIN — Medication 15 MILLILITER(S): at 18:00

## 2024-01-01 RX ADMIN — Medication 15 MILLILITER(S): at 05:44

## 2024-01-01 RX ADMIN — FENTANYL CITRATE 3.63 MICROGRAM(S)/KG/HR: 50 INJECTION, SOLUTION INTRAMUSCULAR; INTRAVENOUS at 05:25

## 2024-01-01 RX ADMIN — DEXTROSE MONOHYDRATE AND SODIUM CHLORIDE 1000 MILLILITER(S): 5; .3 INJECTION, SOLUTION INTRAVENOUS at 13:25

## 2024-01-01 RX ADMIN — PHENOBARBITAL 260 MILLIGRAM(S): 15 TABLET ORAL at 22:05

## 2024-01-01 RX ADMIN — POTASSIUM CHLORIDE 100 MILLIEQUIVALENT(S): 600 TABLET, FILM COATED, EXTENDED RELEASE ORAL at 04:19

## 2024-01-01 RX ADMIN — LACTULOSE 20 GRAM(S): 10 SOLUTION ORAL at 11:16

## 2024-01-01 RX ADMIN — FENTANYL CITRATE 3.63 MICROGRAM(S)/KG/HR: 50 INJECTION, SOLUTION INTRAMUSCULAR; INTRAVENOUS at 05:14

## 2024-01-01 RX ADMIN — PANTOPRAZOLE SODIUM 40 MILLIGRAM(S): 40 INJECTION, POWDER, FOR SOLUTION INTRAVENOUS at 08:37

## 2024-01-01 RX ADMIN — DEXMEDETOMIDINE HYDROCHLORIDE 9.07 MICROGRAM(S)/KG/HR: 4 INJECTION, SOLUTION INTRAVENOUS at 13:08

## 2024-01-01 RX ADMIN — MIDAZOLAM HYDROCHLORIDE 1.45 MG/KG/HR: 1 INJECTION INTRAMUSCULAR; INTRAVENOUS at 03:37

## 2024-01-01 RX ADMIN — IPRATROPIUM BROMIDE AND ALBUTEROL SULFATE 3 MILLILITER(S): .5; 3 SOLUTION RESPIRATORY (INHALATION) at 14:24

## 2024-01-01 RX ADMIN — Medication 5 MILLIGRAM(S): at 21:39

## 2024-01-01 RX ADMIN — Medication 20 MILLIGRAM(S): at 12:27

## 2024-01-01 RX ADMIN — Medication 265 MILLIGRAM(S): at 00:26

## 2024-01-01 RX ADMIN — Medication 400 MILLIGRAM(S): at 14:33

## 2024-01-01 RX ADMIN — Medication 1000 MILLIGRAM(S): at 14:50

## 2024-01-01 RX ADMIN — PIPERACILLIN SODIUM AND TAZOBACTAM SODIUM 25 GRAM(S): 3; .375 INJECTION, POWDER, LYOPHILIZED, FOR SOLUTION INTRAVENOUS at 14:15

## 2024-01-01 RX ADMIN — NOREPINEPHRINE BITARTRATE 6.8 MICROGRAM(S)/KG/MIN: 1 INJECTION INTRAVENOUS at 23:00

## 2024-01-01 RX ADMIN — MIDAZOLAM HYDROCHLORIDE 1.45 MG/KG/HR: 1 INJECTION INTRAMUSCULAR; INTRAVENOUS at 01:12

## 2024-01-01 RX ADMIN — PANTOPRAZOLE SODIUM 40 MILLIGRAM(S): 40 INJECTION, POWDER, FOR SOLUTION INTRAVENOUS at 10:16

## 2024-01-01 RX ADMIN — Medication 63.75 MILLIMOLE(S): at 21:28

## 2024-01-01 RX ADMIN — Medication 30 MILLIGRAM(S): at 11:31

## 2024-01-01 RX ADMIN — MIDODRINE HYDROCHLORIDE 5 MILLIGRAM(S): 10 TABLET ORAL at 06:21

## 2024-01-01 RX ADMIN — LACTULOSE 20 GRAM(S): 10 SOLUTION ORAL at 00:50

## 2024-01-01 RX ADMIN — POTASSIUM CHLORIDE 50 MILLIEQUIVALENT(S): 600 TABLET, FILM COATED, EXTENDED RELEASE ORAL at 10:29

## 2024-01-01 RX ADMIN — Medication 15 MILLILITER(S): at 12:49

## 2024-01-01 RX ADMIN — Medication 25 GRAM(S): at 05:43

## 2024-01-01 RX ADMIN — LACTULOSE 20 GRAM(S): 10 SOLUTION ORAL at 17:08

## 2024-01-01 RX ADMIN — FENTANYL CITRATE 3.63 MICROGRAM(S)/KG/HR: 50 INJECTION, SOLUTION INTRAMUSCULAR; INTRAVENOUS at 02:36

## 2024-01-01 RX ADMIN — VALPROIC ACID 55 MILLIGRAM(S): 250 SOLUTION ORAL at 06:20

## 2024-01-01 RX ADMIN — Medication 2 MILLIGRAM(S): at 23:14

## 2024-01-01 RX ADMIN — VALPROIC ACID 55 MILLIGRAM(S): 250 SOLUTION ORAL at 12:50

## 2024-01-01 RX ADMIN — Medication 20 MILLIGRAM(S): at 06:03

## 2024-01-01 RX ADMIN — DEXTROSE MONOHYDRATE AND SODIUM CHLORIDE 84 MILLILITER(S): 5; .3 INJECTION, SOLUTION INTRAVENOUS at 11:16

## 2024-01-01 RX ADMIN — PROPOFOL 8.71 MICROGRAM(S)/KG/MIN: 10 INJECTION, EMULSION INTRAVENOUS at 13:53

## 2024-01-01 RX ADMIN — POLYETHYLENE GLYCOL 3350 17 GRAM(S): 1 POWDER ORAL at 12:49

## 2024-01-01 RX ADMIN — POTASSIUM CHLORIDE 100 MILLIEQUIVALENT(S): 600 TABLET, FILM COATED, EXTENDED RELEASE ORAL at 12:14

## 2024-01-01 RX ADMIN — DEXMEDETOMIDINE HYDROCHLORIDE 3.63 MICROGRAM(S)/KG/HR: 4 INJECTION, SOLUTION INTRAVENOUS at 23:25

## 2024-01-01 RX ADMIN — HYDROMORPHONE HCL 1 MILLIGRAM(S): 0.2 INJECTION, SOLUTION INTRAVENOUS at 20:28

## 2024-01-01 RX ADMIN — ACETYLCYSTEINE 4 MILLILITER(S): 200 SOLUTION ORAL; RESPIRATORY (INHALATION) at 14:21

## 2024-01-01 RX ADMIN — CISATRACURIUM BESYLATE 13.1 MICROGRAM(S)/KG/MIN: 10 INJECTION INTRAVENOUS at 01:19

## 2024-01-01 RX ADMIN — MIDODRINE HYDROCHLORIDE 5 MILLIGRAM(S): 10 TABLET ORAL at 12:49

## 2024-01-01 RX ADMIN — Medication 25 MILLIGRAM(S): at 08:15

## 2024-01-01 RX ADMIN — PIPERACILLIN SODIUM AND TAZOBACTAM SODIUM 25 GRAM(S): 3; .375 INJECTION, POWDER, LYOPHILIZED, FOR SOLUTION INTRAVENOUS at 21:52

## 2024-01-01 RX ADMIN — PROPOFOL 8.71 MICROGRAM(S)/KG/MIN: 10 INJECTION, EMULSION INTRAVENOUS at 09:13

## 2024-01-01 RX ADMIN — IPRATROPIUM BROMIDE AND ALBUTEROL SULFATE 3 MILLILITER(S): .5; 3 SOLUTION RESPIRATORY (INHALATION) at 16:22

## 2024-01-01 RX ADMIN — OLANZAPINE 10 MILLIGRAM(S): 2.5 TABLET, FILM COATED ORAL at 18:49

## 2024-01-01 RX ADMIN — POTASSIUM CHLORIDE 100 MILLIEQUIVALENT(S): 600 TABLET, FILM COATED, EXTENDED RELEASE ORAL at 22:07

## 2024-01-01 RX ADMIN — Medication 1 MILLIGRAM(S): at 11:03

## 2024-01-01 RX ADMIN — ACETYLCYSTEINE 4 MILLILITER(S): 200 SOLUTION ORAL; RESPIRATORY (INHALATION) at 07:40

## 2024-01-01 RX ADMIN — Medication 1 MILLIGRAM(S): at 12:50

## 2024-01-01 RX ADMIN — HYDROMORPHONE HCL 1 MILLIGRAM(S): 0.2 INJECTION, SOLUTION INTRAVENOUS at 20:58

## 2024-01-01 RX ADMIN — LEVETIRACETAM 1000 MILLIGRAM(S): 100 INJECTION INTRAVENOUS at 05:44

## 2024-01-01 RX ADMIN — Medication 15 MILLILITER(S): at 11:40

## 2024-01-01 RX ADMIN — Medication 25 MILLIGRAM(S): at 06:21

## 2024-01-01 RX ADMIN — PROPOFOL 2.18 MICROGRAM(S)/KG/MIN: 10 INJECTION, EMULSION INTRAVENOUS at 07:19

## 2024-01-01 RX ADMIN — ENOXAPARIN SODIUM 40 MILLIGRAM(S): 100 INJECTION SUBCUTANEOUS at 08:37

## 2024-01-01 RX ADMIN — IPRATROPIUM BROMIDE AND ALBUTEROL SULFATE 3 MILLILITER(S): .5; 3 SOLUTION RESPIRATORY (INHALATION) at 08:19

## 2024-01-01 RX ADMIN — Medication 1000 MILLIGRAM(S): at 08:29

## 2024-01-01 RX ADMIN — FENTANYL CITRATE 3.63 MICROGRAM(S)/KG/HR: 50 INJECTION, SOLUTION INTRAMUSCULAR; INTRAVENOUS at 22:46

## 2024-01-01 RX ADMIN — DIAZEPAM 10 MILLIGRAM(S): 10 TABLET ORAL at 17:04

## 2024-01-01 RX ADMIN — LACTULOSE 20 GRAM(S): 10 SOLUTION ORAL at 11:03

## 2024-01-01 RX ADMIN — Medication 15 MILLILITER(S): at 11:04

## 2024-01-01 RX ADMIN — VALPROIC ACID 55 MILLIGRAM(S): 250 SOLUTION ORAL at 14:27

## 2024-01-01 RX ADMIN — MEROPENEM 1000 MILLIGRAM(S): 500 INJECTION, POWDER, FOR SOLUTION INTRAVENOUS at 02:08

## 2024-01-01 RX ADMIN — Medication 20 MILLIGRAM(S): at 15:38

## 2024-01-01 RX ADMIN — PIPERACILLIN SODIUM AND TAZOBACTAM SODIUM 25 GRAM(S): 3; .375 INJECTION, POWDER, LYOPHILIZED, FOR SOLUTION INTRAVENOUS at 04:15

## 2024-01-01 RX ADMIN — PIPERACILLIN SODIUM AND TAZOBACTAM SODIUM 200 GRAM(S): 3; .375 INJECTION, POWDER, LYOPHILIZED, FOR SOLUTION INTRAVENOUS at 00:15

## 2024-01-01 RX ADMIN — Medication 40 MILLIGRAM(S): at 21:43

## 2024-01-01 RX ADMIN — NOREPINEPHRINE BITARTRATE 6.8 MICROGRAM(S)/KG/MIN: 1 INJECTION INTRAVENOUS at 13:01

## 2024-01-01 RX ADMIN — DEXTROSE MONOHYDRATE AND SODIUM CHLORIDE 100 MILLILITER(S): 5; .3 INJECTION, SOLUTION INTRAVENOUS at 04:00

## 2024-01-01 RX ADMIN — DEXTROSE MONOHYDRATE AND SODIUM CHLORIDE 2000 MILLILITER(S): 5; .3 INJECTION, SOLUTION INTRAVENOUS at 23:26

## 2024-01-01 RX ADMIN — Medication 15 MILLILITER(S): at 11:13

## 2024-01-01 RX ADMIN — PROPOFOL 50 MILLIGRAM(S): 10 INJECTION, EMULSION INTRAVENOUS at 18:20

## 2024-01-01 RX ADMIN — Medication 15 MILLILITER(S): at 11:16

## 2024-01-01 RX ADMIN — Medication 40 MILLIGRAM(S): at 22:20

## 2024-01-01 RX ADMIN — PROPOFOL 2.18 MICROGRAM(S)/KG/MIN: 10 INJECTION, EMULSION INTRAVENOUS at 08:32

## 2024-01-01 RX ADMIN — DEXAMETHASONE 6 MILLIGRAM(S): 1 TABLET ORAL at 17:04

## 2024-01-01 RX ADMIN — MIDAZOLAM HYDROCHLORIDE 1.45 MG/KG/HR: 1 INJECTION INTRAMUSCULAR; INTRAVENOUS at 03:20

## 2024-01-01 RX ADMIN — ACETYLCYSTEINE 4 MILLILITER(S): 200 SOLUTION ORAL; RESPIRATORY (INHALATION) at 04:13

## 2024-01-01 RX ADMIN — DIAZEPAM 10 MILLIGRAM(S): 10 TABLET ORAL at 00:25

## 2024-01-01 RX ADMIN — DEXTROSE MONOHYDRATE AND SODIUM CHLORIDE 84 MILLILITER(S): 5; .3 INJECTION, SOLUTION INTRAVENOUS at 19:28

## 2024-01-01 RX ADMIN — CASPOFUNGIN ACETATE 70 MILLIGRAM(S): 5 INJECTION, POWDER, LYOPHILIZED, FOR SOLUTION INTRAVENOUS at 03:20

## 2024-01-01 RX ADMIN — Medication 20 MILLIGRAM(S): at 11:14

## 2024-01-01 RX ADMIN — Medication 1 APPLICATION(S): at 17:07

## 2024-01-01 RX ADMIN — Medication 1 MILLIGRAM(S): at 11:12

## 2024-01-01 RX ADMIN — PROPOFOL 2.18 MICROGRAM(S)/KG/MIN: 10 INJECTION, EMULSION INTRAVENOUS at 08:46

## 2024-01-01 RX ADMIN — Medication 40 MILLIGRAM(S): at 14:13

## 2024-01-01 RX ADMIN — PIPERACILLIN SODIUM AND TAZOBACTAM SODIUM 25 GRAM(S): 3; .375 INJECTION, POWDER, LYOPHILIZED, FOR SOLUTION INTRAVENOUS at 22:07

## 2024-01-01 RX ADMIN — PROPOFOL 2.18 MICROGRAM(S)/KG/MIN: 10 INJECTION, EMULSION INTRAVENOUS at 08:23

## 2024-01-01 RX ADMIN — Medication 1 PACKET(S): at 21:05

## 2024-01-01 RX ADMIN — PANTOPRAZOLE SODIUM 40 MILLIGRAM(S): 40 INJECTION, POWDER, FOR SOLUTION INTRAVENOUS at 07:54

## 2024-01-01 RX ADMIN — POTASSIUM CHLORIDE 100 MILLIEQUIVALENT(S): 600 TABLET, FILM COATED, EXTENDED RELEASE ORAL at 09:30

## 2024-01-01 RX ADMIN — POLYETHYLENE GLYCOL 3350 17 GRAM(S): 1 POWDER ORAL at 12:15

## 2024-01-01 RX ADMIN — ACETYLCYSTEINE 4 MILLILITER(S): 200 SOLUTION ORAL; RESPIRATORY (INHALATION) at 04:03

## 2024-01-01 RX ADMIN — FENTANYL CITRATE 3.63 MICROGRAM(S)/KG/HR: 50 INJECTION, SOLUTION INTRAMUSCULAR; INTRAVENOUS at 16:13

## 2024-01-01 RX ADMIN — FENTANYL CITRATE 50 MICROGRAM(S): 50 INJECTION, SOLUTION INTRAMUSCULAR; INTRAVENOUS at 06:35

## 2024-01-01 RX ADMIN — DEXTROSE MONOHYDRATE AND SODIUM CHLORIDE 1000 MILLILITER(S): 5; .3 INJECTION, SOLUTION INTRAVENOUS at 12:40

## 2024-01-01 RX ADMIN — Medication 2 PUFF(S): at 08:48

## 2024-01-01 RX ADMIN — PANTOPRAZOLE SODIUM 40 MILLIGRAM(S): 40 INJECTION, POWDER, FOR SOLUTION INTRAVENOUS at 09:40

## 2024-01-01 RX ADMIN — PROPOFOL 8.71 MICROGRAM(S)/KG/MIN: 10 INJECTION, EMULSION INTRAVENOUS at 23:24

## 2024-01-01 RX ADMIN — PROPOFOL 2.18 MICROGRAM(S)/KG/MIN: 10 INJECTION, EMULSION INTRAVENOUS at 02:36

## 2024-01-01 RX ADMIN — Medication 1 MILLIGRAM(S): at 11:30

## 2024-01-01 RX ADMIN — Medication 4 MILLIGRAM(S): at 19:04

## 2024-01-01 RX ADMIN — HYDROMORPHONE HCL 2 MILLIGRAM(S): 0.2 INJECTION, SOLUTION INTRAVENOUS at 10:59

## 2024-01-01 RX ADMIN — Medication 1 APPLICATION(S): at 06:22

## 2024-01-01 RX ADMIN — Medication 15 MILLILITER(S): at 05:53

## 2024-01-01 RX ADMIN — Medication 5 MILLIGRAM(S): at 06:24

## 2024-01-01 RX ADMIN — PANTOPRAZOLE SODIUM 40 MILLIGRAM(S): 40 INJECTION, POWDER, FOR SOLUTION INTRAVENOUS at 09:08

## 2024-01-01 RX ADMIN — Medication 105 MILLIGRAM(S): at 05:18

## 2024-01-01 RX ADMIN — DEXMEDETOMIDINE HYDROCHLORIDE 9.07 MICROGRAM(S)/KG/HR: 4 INJECTION, SOLUTION INTRAVENOUS at 08:33

## 2024-01-01 RX ADMIN — ACETYLCYSTEINE 4 MILLILITER(S): 200 SOLUTION ORAL; RESPIRATORY (INHALATION) at 04:59

## 2024-01-01 RX ADMIN — DEXMEDETOMIDINE HYDROCHLORIDE 5.44 MICROGRAM(S)/KG/HR: 4 INJECTION, SOLUTION INTRAVENOUS at 16:25

## 2024-01-01 RX ADMIN — MIDAZOLAM HYDROCHLORIDE 6 MILLIGRAM(S): 1 INJECTION, SOLUTION INTRAMUSCULAR; INTRAVENOUS at 10:31

## 2024-01-01 RX ADMIN — Medication 100 GRAM(S): at 00:50

## 2024-01-01 RX ADMIN — VALPROIC ACID 55 MILLIGRAM(S): 250 SOLUTION ORAL at 13:06

## 2024-01-01 RX ADMIN — ETOMIDATE 30 MILLIGRAM(S): 2 INJECTION, SOLUTION INTRAVENOUS at 22:36

## 2024-01-01 RX ADMIN — ACETYLCYSTEINE 4 MILLILITER(S): 200 SOLUTION ORAL; RESPIRATORY (INHALATION) at 21:37

## 2024-01-01 RX ADMIN — FENTANYL CITRATE 3.63 MICROGRAM(S)/KG/HR: 50 INJECTION, SOLUTION INTRAMUSCULAR; INTRAVENOUS at 11:56

## 2024-01-01 RX ADMIN — IPRATROPIUM BROMIDE AND ALBUTEROL SULFATE 3 MILLILITER(S): .5; 3 SOLUTION RESPIRATORY (INHALATION) at 21:37

## 2024-01-01 RX ADMIN — POTASSIUM CHLORIDE 100 MILLIEQUIVALENT(S): 600 TABLET, FILM COATED, EXTENDED RELEASE ORAL at 11:13

## 2024-01-01 RX ADMIN — Medication 15 MILLILITER(S): at 11:31

## 2024-01-01 RX ADMIN — IPRATROPIUM BROMIDE AND ALBUTEROL SULFATE 3 MILLILITER(S): .5; 3 SOLUTION RESPIRATORY (INHALATION) at 04:13

## 2024-01-01 RX ADMIN — DEXMEDETOMIDINE HYDROCHLORIDE 9.07 MICROGRAM(S)/KG/HR: 4 INJECTION, SOLUTION INTRAVENOUS at 00:13

## 2024-01-01 RX ADMIN — PROPOFOL 2.18 MICROGRAM(S)/KG/MIN: 10 INJECTION, EMULSION INTRAVENOUS at 03:21

## 2024-01-01 RX ADMIN — PIPERACILLIN SODIUM AND TAZOBACTAM SODIUM 25 GRAM(S): 3; .375 INJECTION, POWDER, LYOPHILIZED, FOR SOLUTION INTRAVENOUS at 15:30

## 2024-01-01 RX ADMIN — PIPERACILLIN SODIUM AND TAZOBACTAM SODIUM 25 GRAM(S): 3; .375 INJECTION, POWDER, LYOPHILIZED, FOR SOLUTION INTRAVENOUS at 14:58

## 2024-01-01 RX ADMIN — PHENYLEPHRINE HYDROCHLORIDE 8.17 MICROGRAM(S)/KG/MIN: 10 INJECTION INTRAVENOUS at 21:28

## 2024-01-01 RX ADMIN — IPRATROPIUM BROMIDE AND ALBUTEROL SULFATE 3 MILLILITER(S): .5; 3 SOLUTION RESPIRATORY (INHALATION) at 04:20

## 2024-01-01 RX ADMIN — Medication 400 MILLIGRAM(S): at 19:04

## 2024-01-01 RX ADMIN — Medication 15 MILLILITER(S): at 18:48

## 2024-01-01 RX ADMIN — FENTANYL CITRATE 3.63 MICROGRAM(S)/KG/HR: 50 INJECTION, SOLUTION INTRAMUSCULAR; INTRAVENOUS at 23:13

## 2024-01-01 RX ADMIN — POTASSIUM CHLORIDE 100 MILLIEQUIVALENT(S): 600 TABLET, FILM COATED, EXTENDED RELEASE ORAL at 21:00

## 2024-01-01 RX ADMIN — Medication 1 APPLICATION(S): at 04:16

## 2024-01-01 RX ADMIN — Medication 400 MILLIGRAM(S): at 18:53

## 2024-01-01 RX ADMIN — Medication 6 UNIT(S)/MIN: at 14:19

## 2024-01-01 RX ADMIN — Medication 15 MILLILITER(S): at 05:49

## 2024-01-01 RX ADMIN — POLYETHYLENE GLYCOL 3350 17 GRAM(S): 1 POWDER ORAL at 11:33

## 2024-01-01 RX ADMIN — Medication 500 MILLILITER(S): at 15:14

## 2024-01-01 RX ADMIN — Medication 15 MILLILITER(S): at 17:10

## 2024-01-01 RX ADMIN — ACETYLCYSTEINE 4 MILLILITER(S): 200 SOLUTION ORAL; RESPIRATORY (INHALATION) at 13:38

## 2024-01-01 RX ADMIN — HYDROMORPHONE HCL 1 MILLIGRAM(S): 0.2 INJECTION, SOLUTION INTRAVENOUS at 21:43

## 2024-01-01 RX ADMIN — VANCOMYCIN HYDROCHLORIDE 166.67 MILLIGRAM(S): KIT at 09:10

## 2024-01-01 RX ADMIN — POTASSIUM CHLORIDE 100 MILLIEQUIVALENT(S): 600 TABLET, FILM COATED, EXTENDED RELEASE ORAL at 18:48

## 2024-01-01 RX ADMIN — Medication 2 PUFF(S): at 19:53

## 2024-01-01 RX ADMIN — Medication 15 MILLILITER(S): at 18:34

## 2024-01-01 RX ADMIN — Medication 20 MILLIGRAM(S): at 06:14

## 2024-01-01 RX ADMIN — Medication 105 MILLIGRAM(S): at 12:47

## 2024-01-01 RX ADMIN — Medication 15 MILLILITER(S): at 05:13

## 2024-01-01 RX ADMIN — DEXTROSE MONOHYDRATE AND SODIUM CHLORIDE 84 MILLILITER(S): 5; .3 INJECTION, SOLUTION INTRAVENOUS at 05:54

## 2024-01-01 RX ADMIN — DEXAMETHASONE 6 MILLIGRAM(S): 1 TABLET ORAL at 00:22

## 2024-01-01 RX ADMIN — Medication 1000 MILLIGRAM(S): at 16:22

## 2024-01-01 RX ADMIN — Medication 1 MILLIGRAM(S): at 11:16

## 2024-01-01 RX ADMIN — PROPOFOL 8.71 MICROGRAM(S)/KG/MIN: 10 INJECTION, EMULSION INTRAVENOUS at 14:38

## 2024-01-01 RX ADMIN — FENTANYL CITRATE 3.63 MICROGRAM(S)/KG/HR: 50 INJECTION, SOLUTION INTRAMUSCULAR; INTRAVENOUS at 12:50

## 2024-01-01 RX ADMIN — MIDAZOLAM HYDROCHLORIDE 1.45 MG/KG/HR: 1 INJECTION INTRAMUSCULAR; INTRAVENOUS at 23:48

## 2024-01-01 RX ADMIN — DIAZEPAM 10 MILLIGRAM(S): 10 TABLET ORAL at 06:02

## 2024-01-01 RX ADMIN — Medication 6 UNIT(S)/MIN: at 03:46

## 2024-01-01 RX ADMIN — ENOXAPARIN SODIUM 40 MILLIGRAM(S): 100 INJECTION SUBCUTANEOUS at 12:50

## 2024-01-01 RX ADMIN — Medication 25 MILLIGRAM(S): at 17:11

## 2024-01-01 RX ADMIN — CISATRACURIUM BESYLATE 13.1 MICROGRAM(S)/KG/MIN: 10 INJECTION INTRAVENOUS at 10:29

## 2024-01-01 RX ADMIN — Medication 40 MILLIGRAM(S): at 13:02

## 2024-01-01 RX ADMIN — MIDODRINE HYDROCHLORIDE 5 MILLIGRAM(S): 10 TABLET ORAL at 21:48

## 2024-01-01 RX ADMIN — Medication 40 MILLIGRAM(S): at 13:46

## 2024-01-01 RX ADMIN — LEVETIRACETAM 1000 MILLIGRAM(S): 100 INJECTION INTRAVENOUS at 17:10

## 2024-01-01 RX ADMIN — MIDODRINE HYDROCHLORIDE 5 MILLIGRAM(S): 10 TABLET ORAL at 21:43

## 2024-01-01 RX ADMIN — Medication 105 MILLIGRAM(S): at 21:13

## 2024-01-01 RX ADMIN — VANCOMYCIN HYDROCHLORIDE 250 MILLIGRAM(S): KIT at 06:01

## 2024-01-01 RX ADMIN — PROPOFOL 2.18 MICROGRAM(S)/KG/MIN: 10 INJECTION, EMULSION INTRAVENOUS at 22:37

## 2024-01-01 RX ADMIN — LEVETIRACETAM 1000 MILLIGRAM(S): 100 INJECTION INTRAVENOUS at 02:06

## 2024-01-01 RX ADMIN — ENOXAPARIN SODIUM 40 MILLIGRAM(S): 100 INJECTION SUBCUTANEOUS at 09:08

## 2024-01-01 RX ADMIN — PIPERACILLIN SODIUM AND TAZOBACTAM SODIUM 25 GRAM(S): 3; .375 INJECTION, POWDER, LYOPHILIZED, FOR SOLUTION INTRAVENOUS at 21:49

## 2024-01-01 RX ADMIN — MEROPENEM 1000 MILLIGRAM(S): 500 INJECTION, POWDER, FOR SOLUTION INTRAVENOUS at 13:02

## 2024-01-01 RX ADMIN — CISATRACURIUM BESYLATE 13.1 MICROGRAM(S)/KG/MIN: 10 INJECTION INTRAVENOUS at 08:09

## 2024-01-01 RX ADMIN — DEXTROSE MONOHYDRATE AND SODIUM CHLORIDE 84 MILLILITER(S): 5; .3 INJECTION, SOLUTION INTRAVENOUS at 11:20

## 2024-01-01 RX ADMIN — MEROPENEM 1000 MILLIGRAM(S): 500 INJECTION, POWDER, FOR SOLUTION INTRAVENOUS at 21:28

## 2024-01-01 RX ADMIN — Medication 20 MILLIGRAM(S): at 06:02

## 2024-01-01 RX ADMIN — PROPOFOL 2.18 MICROGRAM(S)/KG/MIN: 10 INJECTION, EMULSION INTRAVENOUS at 00:30

## 2024-01-01 RX ADMIN — NOREPINEPHRINE BITARTRATE 6.8 MICROGRAM(S)/KG/MIN: 1 INJECTION INTRAVENOUS at 08:33

## 2024-01-01 RX ADMIN — Medication 1 MILLIGRAM(S): at 13:02

## 2024-01-01 RX ADMIN — Medication 105 MILLIGRAM(S): at 13:08

## 2024-01-01 RX ADMIN — IPRATROPIUM BROMIDE AND ALBUTEROL SULFATE 3 MILLILITER(S): .5; 3 SOLUTION RESPIRATORY (INHALATION) at 04:58

## 2024-01-01 RX ADMIN — FENTANYL CITRATE 3.63 MICROGRAM(S)/KG/HR: 50 INJECTION, SOLUTION INTRAMUSCULAR; INTRAVENOUS at 22:59

## 2024-01-01 RX ADMIN — ENOXAPARIN SODIUM 40 MILLIGRAM(S): 100 INJECTION SUBCUTANEOUS at 10:12

## 2024-01-01 RX ADMIN — MIDAZOLAM HYDROCHLORIDE 1.45 MG/KG/HR: 1 INJECTION INTRAMUSCULAR; INTRAVENOUS at 13:03

## 2024-01-01 RX ADMIN — MIDAZOLAM HYDROCHLORIDE 1.45 MG/KG/HR: 1 INJECTION INTRAMUSCULAR; INTRAVENOUS at 07:15

## 2024-01-01 RX ADMIN — PHENYLEPHRINE HYDROCHLORIDE 8.17 MICROGRAM(S)/KG/MIN: 10 INJECTION INTRAVENOUS at 09:29

## 2024-01-01 RX ADMIN — DEXAMETHASONE 110 MILLIGRAM(S): 1 TABLET ORAL at 05:53

## 2024-01-01 RX ADMIN — MEROPENEM 1000 MILLIGRAM(S): 500 INJECTION, POWDER, FOR SOLUTION INTRAVENOUS at 06:01

## 2024-01-01 RX ADMIN — PROPOFOL 2.18 MICROGRAM(S)/KG/MIN: 10 INJECTION, EMULSION INTRAVENOUS at 19:28

## 2024-01-01 RX ADMIN — LEVETIRACETAM 1000 MILLIGRAM(S): 100 INJECTION INTRAVENOUS at 05:16

## 2024-01-01 RX ADMIN — POTASSIUM PHOSPHATE, MONOBASIC POTASSIUM PHOSPHATE, DIBASIC INJECTION, 83.33 MILLIMOLE(S): 236; 224 SOLUTION, CONCENTRATE INTRAVENOUS at 09:10

## 2024-01-01 RX ADMIN — VALPROIC ACID 55 MILLIGRAM(S): 250 SOLUTION ORAL at 04:16

## 2024-01-01 RX ADMIN — PIPERACILLIN SODIUM AND TAZOBACTAM SODIUM 25 GRAM(S): 3; .375 INJECTION, POWDER, LYOPHILIZED, FOR SOLUTION INTRAVENOUS at 05:53

## 2024-01-01 RX ADMIN — PIPERACILLIN SODIUM AND TAZOBACTAM SODIUM 25 GRAM(S): 3; .375 INJECTION, POWDER, LYOPHILIZED, FOR SOLUTION INTRAVENOUS at 14:19

## 2024-01-01 RX ADMIN — LACTULOSE 20 GRAM(S): 10 SOLUTION ORAL at 23:29

## 2024-01-01 RX ADMIN — POTASSIUM PHOSPHATE, MONOBASIC POTASSIUM PHOSPHATE, DIBASIC INJECTION, 83.33 MILLIMOLE(S): 236; 224 SOLUTION, CONCENTRATE INTRAVENOUS at 23:29

## 2024-01-01 RX ADMIN — ROCURONIUM BROMIDE 100 MILLIGRAM(S): 100 INJECTION INTRAVENOUS at 10:00

## 2024-01-01 RX ADMIN — LACTULOSE 20 GRAM(S): 10 SOLUTION ORAL at 05:53

## 2024-01-01 RX ADMIN — SODIUM CHLORIDE 750 MILLILITER(S): 3 INJECTION, SOLUTION INTRAVENOUS at 04:34

## 2024-01-01 RX ADMIN — HYDROMORPHONE HCL 1 MILLIGRAM(S): 0.2 INJECTION, SOLUTION INTRAVENOUS at 00:33

## 2024-01-01 RX ADMIN — PIPERACILLIN SODIUM AND TAZOBACTAM SODIUM 200 GRAM(S): 3; .375 INJECTION, POWDER, LYOPHILIZED, FOR SOLUTION INTRAVENOUS at 18:49

## 2024-01-01 RX ADMIN — Medication 1 MILLIGRAM(S): at 12:27

## 2024-01-01 RX ADMIN — ACETYLCYSTEINE 4 MILLILITER(S): 200 SOLUTION ORAL; RESPIRATORY (INHALATION) at 08:19

## 2024-01-01 RX ADMIN — Medication 1000 MILLIGRAM(S): at 07:57

## 2024-01-01 RX ADMIN — Medication 1000 MILLIGRAM(S): at 12:50

## 2024-01-01 RX ADMIN — PANTOPRAZOLE SODIUM 40 MILLIGRAM(S): 40 INJECTION, POWDER, FOR SOLUTION INTRAVENOUS at 09:10

## 2024-01-01 RX ADMIN — Medication 15 MILLILITER(S): at 06:02

## 2024-01-01 RX ADMIN — VANCOMYCIN HYDROCHLORIDE 250 MILLIGRAM(S): KIT at 21:07

## 2024-01-01 RX ADMIN — MIDODRINE HYDROCHLORIDE 5 MILLIGRAM(S): 10 TABLET ORAL at 05:43

## 2024-01-01 RX ADMIN — DIAZEPAM 10 MILLIGRAM(S): 10 TABLET ORAL at 11:58

## 2024-01-01 RX ADMIN — LEVETIRACETAM 500 MILLIGRAM(S): 100 INJECTION INTRAVENOUS at 05:13

## 2024-01-01 RX ADMIN — Medication 15 MILLILITER(S): at 13:02

## 2024-01-01 RX ADMIN — Medication 15 MILLILITER(S): at 05:54

## 2024-01-01 RX ADMIN — ENOXAPARIN SODIUM 40 MILLIGRAM(S): 100 INJECTION SUBCUTANEOUS at 12:27

## 2024-01-01 RX ADMIN — DEXTROSE MONOHYDRATE AND SODIUM CHLORIDE 100 MILLILITER(S): 5; .3 INJECTION, SOLUTION INTRAVENOUS at 13:41

## 2024-01-01 RX ADMIN — Medication 2 MILLIGRAM(S): at 22:56

## 2024-01-01 RX ADMIN — Medication 1 MILLIGRAM(S): at 13:36

## 2024-01-01 RX ADMIN — DEXMEDETOMIDINE HYDROCHLORIDE 9.07 MICROGRAM(S)/KG/HR: 4 INJECTION, SOLUTION INTRAVENOUS at 15:42

## 2024-01-01 RX ADMIN — Medication 1 MILLIGRAM(S): at 13:46

## 2024-01-01 RX ADMIN — Medication 1 APPLICATION(S): at 06:35

## 2024-01-01 RX ADMIN — DEXTROSE MONOHYDRATE AND SODIUM CHLORIDE 2000 MILLILITER(S): 5; .3 INJECTION, SOLUTION INTRAVENOUS at 20:25

## 2024-01-01 RX ADMIN — Medication 15 MILLILITER(S): at 17:26

## 2024-01-01 RX ADMIN — LACTULOSE 20 GRAM(S): 10 SOLUTION ORAL at 17:06

## 2024-01-01 RX ADMIN — Medication 1 APPLICATION(S): at 18:00

## 2024-01-01 RX ADMIN — Medication 2 MILLIGRAM(S): at 21:40

## 2024-01-01 RX ADMIN — MEROPENEM 1000 MILLIGRAM(S): 500 INJECTION, POWDER, FOR SOLUTION INTRAVENOUS at 13:46

## 2024-01-01 RX ADMIN — PROPOFOL 2.18 MICROGRAM(S)/KG/MIN: 10 INJECTION, EMULSION INTRAVENOUS at 03:52

## 2024-01-01 RX ADMIN — Medication 1000 MILLILITER(S): at 12:33

## 2024-01-01 RX ADMIN — Medication 20 MILLIGRAM(S): at 09:41

## 2024-01-01 RX ADMIN — PROPOFOL 8.71 MICROGRAM(S)/KG/MIN: 10 INJECTION, EMULSION INTRAVENOUS at 09:57

## 2024-01-01 RX ADMIN — PIPERACILLIN SODIUM AND TAZOBACTAM SODIUM 25 GRAM(S): 3; .375 INJECTION, POWDER, LYOPHILIZED, FOR SOLUTION INTRAVENOUS at 22:22

## 2024-01-01 RX ADMIN — Medication 400 MILLIGRAM(S): at 16:26

## 2024-01-01 RX ADMIN — PROPOFOL 2.18 MICROGRAM(S)/KG/MIN: 10 INJECTION, EMULSION INTRAVENOUS at 10:11

## 2024-01-01 RX ADMIN — Medication 105 MILLIGRAM(S): at 21:43

## 2024-01-01 RX ADMIN — Medication 2 PUFF(S): at 08:34

## 2024-01-01 RX ADMIN — DEXAMETHASONE 6 MILLIGRAM(S): 1 TABLET ORAL at 11:13

## 2024-01-01 RX ADMIN — PIPERACILLIN SODIUM AND TAZOBACTAM SODIUM 25 GRAM(S): 3; .375 INJECTION, POWDER, LYOPHILIZED, FOR SOLUTION INTRAVENOUS at 04:16

## 2024-01-01 RX ADMIN — Medication 25 MILLIGRAM(S): at 05:16

## 2024-01-01 RX ADMIN — Medication 40 MILLIGRAM(S): at 05:13

## 2024-01-01 RX ADMIN — PROPOFOL 2.18 MICROGRAM(S)/KG/MIN: 10 INJECTION, EMULSION INTRAVENOUS at 17:21

## 2024-01-01 RX ADMIN — PIPERACILLIN SODIUM AND TAZOBACTAM SODIUM 25 GRAM(S): 3; .375 INJECTION, POWDER, LYOPHILIZED, FOR SOLUTION INTRAVENOUS at 16:00

## 2024-01-01 RX ADMIN — Medication 1 APPLICATION(S): at 05:45

## 2024-01-01 RX ADMIN — Medication 40 MILLIGRAM(S): at 21:28

## 2024-01-01 RX ADMIN — ACETYLCYSTEINE 4 MILLILITER(S): 200 SOLUTION ORAL; RESPIRATORY (INHALATION) at 16:23

## 2024-01-01 RX ADMIN — IPRATROPIUM BROMIDE AND ALBUTEROL SULFATE 3 MILLILITER(S): .5; 3 SOLUTION RESPIRATORY (INHALATION) at 21:19

## 2024-01-01 RX ADMIN — IPRATROPIUM BROMIDE AND ALBUTEROL SULFATE 3 MILLILITER(S): .5; 3 SOLUTION RESPIRATORY (INHALATION) at 07:39

## 2024-01-01 RX ADMIN — Medication 15 MILLILITER(S): at 17:55

## 2024-01-01 RX ADMIN — VANCOMYCIN HYDROCHLORIDE 250 MILLIGRAM(S): KIT at 05:59

## 2024-01-01 RX ADMIN — PROPOFOL 2.18 MICROGRAM(S)/KG/MIN: 10 INJECTION, EMULSION INTRAVENOUS at 17:26

## 2024-01-01 RX ADMIN — IPRATROPIUM BROMIDE AND ALBUTEROL SULFATE 3 MILLILITER(S): .5; 3 SOLUTION RESPIRATORY (INHALATION) at 08:13

## 2024-01-01 RX ADMIN — Medication 50 MILLIGRAM(S): at 16:20

## 2024-01-01 RX ADMIN — PIPERACILLIN SODIUM AND TAZOBACTAM SODIUM 25 GRAM(S): 3; .375 INJECTION, POWDER, LYOPHILIZED, FOR SOLUTION INTRAVENOUS at 00:22

## 2024-01-01 RX ADMIN — PROPOFOL 50 MILLIGRAM(S): 10 INJECTION, EMULSION INTRAVENOUS at 22:38

## 2024-01-01 RX ADMIN — PIPERACILLIN SODIUM AND TAZOBACTAM SODIUM 25 GRAM(S): 3; .375 INJECTION, POWDER, LYOPHILIZED, FOR SOLUTION INTRAVENOUS at 05:13

## 2024-01-01 RX ADMIN — MIDAZOLAM HYDROCHLORIDE 1.45 MG/KG/HR: 1 INJECTION INTRAMUSCULAR; INTRAVENOUS at 15:47

## 2024-01-01 RX ADMIN — POTASSIUM CHLORIDE 50 MILLIEQUIVALENT(S): 600 TABLET, FILM COATED, EXTENDED RELEASE ORAL at 12:00

## 2024-01-01 RX ADMIN — VALPROIC ACID 55 MILLIGRAM(S): 250 SOLUTION ORAL at 05:43

## 2024-01-01 RX ADMIN — POTASSIUM PHOSPHATE, MONOBASIC POTASSIUM PHOSPHATE, DIBASIC INJECTION, 83.33 MILLIMOLE(S): 236; 224 SOLUTION, CONCENTRATE INTRAVENOUS at 21:05

## 2024-01-01 RX ADMIN — Medication 400 MILLIGRAM(S): at 02:52

## 2024-01-01 RX ADMIN — Medication 100 GRAM(S): at 08:36

## 2024-01-01 RX ADMIN — POTASSIUM CHLORIDE 100 MILLIEQUIVALENT(S): 600 TABLET, FILM COATED, EXTENDED RELEASE ORAL at 14:16

## 2024-01-01 RX ADMIN — Medication 1 MILLIGRAM(S): at 10:13

## 2024-01-01 RX ADMIN — ROCURONIUM BROMIDE 50 MILLIGRAM(S): 100 INJECTION INTRAVENOUS at 18:20

## 2024-01-01 RX ADMIN — Medication 40 MILLIGRAM(S): at 05:53

## 2024-01-01 RX ADMIN — ENOXAPARIN SODIUM 40 MILLIGRAM(S): 100 INJECTION SUBCUTANEOUS at 07:53

## 2024-01-01 RX ADMIN — AZITHROMYCIN 500 MILLIGRAM(S): 250 TABLET, FILM COATED ORAL at 16:25

## 2024-01-01 RX ADMIN — Medication 100 MILLIGRAM(S): at 12:34

## 2024-01-01 RX ADMIN — DIAZEPAM 10 MILLIGRAM(S): 10 TABLET ORAL at 16:50

## 2024-01-01 RX ADMIN — DIAZEPAM 10 MILLIGRAM(S): 10 TABLET ORAL at 11:31

## 2024-01-01 RX ADMIN — Medication 1 MILLIGRAM(S): at 12:14

## 2024-01-01 RX ADMIN — MUPIROCIN 1 APPLICATION(S): 20 OINTMENT TOPICAL at 05:59

## 2024-01-01 RX ADMIN — Medication 25 MILLIGRAM(S): at 18:44

## 2024-01-01 RX ADMIN — PROPOFOL 2.18 MICROGRAM(S)/KG/MIN: 10 INJECTION, EMULSION INTRAVENOUS at 20:52

## 2024-01-01 RX ADMIN — Medication 25 GRAM(S): at 21:28

## 2024-01-01 RX ADMIN — DEXAMETHASONE 6 MILLIGRAM(S): 1 TABLET ORAL at 00:17

## 2024-01-01 RX ADMIN — FENTANYL CITRATE 3.63 MICROGRAM(S)/KG/HR: 50 INJECTION, SOLUTION INTRAMUSCULAR; INTRAVENOUS at 22:37

## 2024-01-01 RX ADMIN — Medication 2 MILLIGRAM(S): at 20:45

## 2024-01-01 RX ADMIN — Medication 20 MILLIGRAM(S): at 16:22

## 2024-01-01 RX ADMIN — ONDANSETRON HYDROCHLORIDE 4 MILLIGRAM(S): 2 INJECTION INTRAMUSCULAR; INTRAVENOUS at 06:00

## 2024-01-01 RX ADMIN — DEXTROSE MONOHYDRATE AND SODIUM CHLORIDE 1000 MILLILITER(S): 5; .3 INJECTION, SOLUTION INTRAVENOUS at 18:00

## 2024-01-01 RX ADMIN — SODIUM CHLORIDE 1500 MILLILITER(S): 9 INJECTION INTRAMUSCULAR; INTRAVENOUS; SUBCUTANEOUS at 08:15

## 2024-01-01 RX ADMIN — DEXMEDETOMIDINE HYDROCHLORIDE 9.07 MICROGRAM(S)/KG/HR: 4 INJECTION, SOLUTION INTRAVENOUS at 10:24

## 2024-01-01 RX ADMIN — POTASSIUM CHLORIDE 100 MILLIEQUIVALENT(S): 600 TABLET, FILM COATED, EXTENDED RELEASE ORAL at 08:29

## 2024-01-01 RX ADMIN — DEXTROSE MONOHYDRATE AND SODIUM CHLORIDE 1000 MILLILITER(S): 5; .3 INJECTION, SOLUTION INTRAVENOUS at 16:25

## 2024-01-01 RX ADMIN — POLYETHYLENE GLYCOL 3350 17 GRAM(S): 1 POWDER ORAL at 12:26

## 2024-01-01 RX ADMIN — PIPERACILLIN SODIUM AND TAZOBACTAM SODIUM 25 GRAM(S): 3; .375 INJECTION, POWDER, LYOPHILIZED, FOR SOLUTION INTRAVENOUS at 21:20

## 2024-01-01 RX ADMIN — VANCOMYCIN HYDROCHLORIDE 166.67 MILLIGRAM(S): KIT at 15:49

## 2024-01-01 RX ADMIN — PIPERACILLIN SODIUM AND TAZOBACTAM SODIUM 3.38 GRAM(S): 3; .375 INJECTION, POWDER, LYOPHILIZED, FOR SOLUTION INTRAVENOUS at 16:00

## 2024-01-01 RX ADMIN — Medication 15 MILLILITER(S): at 13:00

## 2024-01-01 RX ADMIN — PROPOFOL 8.71 MICROGRAM(S)/KG/MIN: 10 INJECTION, EMULSION INTRAVENOUS at 05:59

## 2024-01-01 RX ADMIN — IPRATROPIUM BROMIDE AND ALBUTEROL SULFATE 3 MILLILITER(S): .5; 3 SOLUTION RESPIRATORY (INHALATION) at 08:28

## 2024-01-01 RX ADMIN — IPRATROPIUM BROMIDE AND ALBUTEROL SULFATE 3 MILLILITER(S): .5; 3 SOLUTION RESPIRATORY (INHALATION) at 04:38

## 2024-01-01 RX ADMIN — POTASSIUM CHLORIDE 100 MILLIEQUIVALENT(S): 600 TABLET, FILM COATED, EXTENDED RELEASE ORAL at 10:14

## 2024-01-01 RX ADMIN — POTASSIUM CHLORIDE 100 MILLIEQUIVALENT(S): 600 TABLET, FILM COATED, EXTENDED RELEASE ORAL at 10:50

## 2024-01-01 RX ADMIN — ENOXAPARIN SODIUM 40 MILLIGRAM(S): 100 INJECTION SUBCUTANEOUS at 09:10

## 2024-01-01 RX ADMIN — IPRATROPIUM BROMIDE AND ALBUTEROL SULFATE 3 MILLILITER(S): .5; 3 SOLUTION RESPIRATORY (INHALATION) at 14:21

## 2024-01-01 RX ADMIN — PROPOFOL 2.18 MICROGRAM(S)/KG/MIN: 10 INJECTION, EMULSION INTRAVENOUS at 07:53

## 2024-01-01 RX ADMIN — DEXAMETHASONE 6 MILLIGRAM(S): 1 TABLET ORAL at 06:01

## 2024-01-01 RX ADMIN — MIDAZOLAM HYDROCHLORIDE 2 MILLIGRAM(S): 1 INJECTION INTRAMUSCULAR; INTRAVENOUS at 16:07

## 2024-01-01 RX ADMIN — PROPOFOL 2.18 MICROGRAM(S)/KG/MIN: 10 INJECTION, EMULSION INTRAVENOUS at 15:01

## 2024-01-01 RX ADMIN — Medication 1 MILLIGRAM(S): at 13:25

## 2024-01-01 RX ADMIN — PIPERACILLIN SODIUM AND TAZOBACTAM SODIUM 3.38 GRAM(S): 3; .375 INJECTION, POWDER, LYOPHILIZED, FOR SOLUTION INTRAVENOUS at 10:37

## 2024-01-01 RX ADMIN — PIPERACILLIN SODIUM AND TAZOBACTAM SODIUM 25 GRAM(S): 3; .375 INJECTION, POWDER, LYOPHILIZED, FOR SOLUTION INTRAVENOUS at 13:00

## 2024-01-01 RX ADMIN — MIDAZOLAM HYDROCHLORIDE 1.45 MG/KG/HR: 1 INJECTION INTRAMUSCULAR; INTRAVENOUS at 22:45

## 2024-01-01 RX ADMIN — DEXAMETHASONE 6 MILLIGRAM(S): 1 TABLET ORAL at 05:10

## 2024-01-01 RX ADMIN — Medication 1 APPLICATION(S): at 05:15

## 2024-01-01 RX ADMIN — CASPOFUNGIN ACETATE 260 MILLIGRAM(S): 5 INJECTION, POWDER, LYOPHILIZED, FOR SOLUTION INTRAVENOUS at 02:17

## 2024-01-01 RX ADMIN — DEXTROSE MONOHYDRATE AND SODIUM CHLORIDE 84 MILLILITER(S): 5; .3 INJECTION, SOLUTION INTRAVENOUS at 10:11

## 2024-01-01 RX ADMIN — ACETYLCYSTEINE 4 MILLILITER(S): 200 SOLUTION ORAL; RESPIRATORY (INHALATION) at 04:38

## 2024-01-01 RX ADMIN — DEXMEDETOMIDINE HYDROCHLORIDE 9.07 MICROGRAM(S)/KG/HR: 4 INJECTION, SOLUTION INTRAVENOUS at 07:56

## 2024-01-01 RX ADMIN — MUPIROCIN 1 APPLICATION(S): 20 OINTMENT TOPICAL at 18:48

## 2024-01-01 RX ADMIN — PIPERACILLIN SODIUM AND TAZOBACTAM SODIUM 25 GRAM(S): 3; .375 INJECTION, POWDER, LYOPHILIZED, FOR SOLUTION INTRAVENOUS at 10:37

## 2024-01-01 RX ADMIN — LEVETIRACETAM 500 MILLIGRAM(S): 100 INJECTION INTRAVENOUS at 18:00

## 2024-01-01 RX ADMIN — Medication 15 MILLILITER(S): at 04:15

## 2024-01-01 RX ADMIN — VANCOMYCIN HYDROCHLORIDE 250 MILLIGRAM(S): KIT at 22:08

## 2024-01-01 RX ADMIN — MIDODRINE HYDROCHLORIDE 10 MILLIGRAM(S): 10 TABLET ORAL at 16:06

## 2024-01-31 NOTE — PATIENT PROFILE ADULT. - HOW PATIENT ADDRESSED, PROFILE
Allan Name: Brittney D Reyes      : 1996      MRN: [id number]  Encounter Provider: Denys Morrow MD  Encounter Date: 2024   Encounter department: [facility]    Assessment & Plan     1. Attention deficit hyperactivity disorder (ADHD), unspecified ADHD type  Assessment & Plan:  Stable on Adderall.  Continue same and we will continue to monitor.  Come back in 6 months for follow-up.      2. Mixed hyperlipidemia  Assessment & Plan:  Well-controlled without medication.  Continue to follow low-fat diet.  We will continue to monitor fasting lipid profile.      3. Gastroesophageal reflux disease without esophagitis  Assessment & Plan:  Doing well on pantoprazole.  Continue same.  Will continue to monitor.      4. Seasonal allergic rhinitis due to other allergic trigger  Assessment & Plan:  Stable.  We will continue to monitor.      5. Healthcare maintenance  Assessment & Plan:  It was discussed about immunizations, diet, exercise and safety measures.    Orders:  -     Comprehensive metabolic panel; Future  -     CBC and differential; Future  -     Lipid Panel with Direct LDL reflex; Future  -     TSH, 3rd generation with Free T4 reflex; Future    6. Attention deficit hyperactivity disorder (ADHD), combined type  Assessment & Plan:  Stable on Adderall.  Continue same and we will continue to monitor.  Come back in 6 months for follow-up.    Orders:  -     amphetamine-dextroamphetamine (ADDERALL) 10 mg tablet; Take 1 tablet (10 mg total) by mouth 2 (two) times a day Max Daily Amount: 20 mg           Subjective     She is here today for follow-up multiple medical problems.  She has been taking her medications.  She denies any side effects.  She has not been using the Wegovy on a weekly basis.  She has been maintaining her weight loss.  She is taking Adderall but taking a break on weekends and is helping with her concentration.  She denies any other complaint.  She is due for blood  work.      Review of Systems   Constitutional:  Negative for chills and fever.   HENT:  Negative for trouble swallowing.    Eyes:  Negative for visual disturbance.   Respiratory:  Negative for cough and shortness of breath.    Cardiovascular:  Negative for chest pain, palpitations and leg swelling.   Gastrointestinal:  Negative for abdominal pain, constipation and diarrhea.   Endocrine: Negative for cold intolerance and heat intolerance.   Genitourinary:  Negative for difficulty urinating and dysuria.   Musculoskeletal:  Negative for gait problem.   Skin:  Negative for rash.   Neurological:  Negative for dizziness, tremors, seizures and headaches.   Hematological:  Negative for adenopathy.   Psychiatric/Behavioral:  Negative for behavioral problems.        Past Medical History:   Diagnosis Date   • Abnormal Pap smear of cervix 2012    Resulted in cone biopsy. No abnormal findings; 7/2023 ASCUS with pos other HPV.  Needs COLPO.   • ADHD    • Anxiety    • Chlamydia Treated in November 2015   • IBS (irritable bowel syndrome)    • Migraine     Occasional; with aura     Past Surgical History:   Procedure Laterality Date   • COLPOSCOPY  2012    reportedly normal   • SINUS SURGERY       Family History   Problem Relation Age of Onset   • Stroke Father    • Transient ischemic attack Father    • ADD / ADHD Brother    • Breast cancer Maternal Grandmother         ? age   • Hodgkin's lymphoma Maternal Aunt    • Cancer Maternal Aunt         Hodgkins lymphoma   • Colon cancer Neg Hx    • Ovarian cancer Neg Hx    • Uterine cancer Neg Hx      Social History     Socioeconomic History   • Marital status: Single     Spouse name: None   • Number of children: None   • Years of education: None   • Highest education level: None   Occupational History   • None   Tobacco Use   • Smoking status: Never     Passive exposure: Never   • Smokeless tobacco: Never   • Tobacco comments:     Socially in college, never habitually.   Vaping Use   •  Vaping status: Former   Substance and Sexual Activity   • Alcohol use: Yes     Alcohol/week: 6.0 standard drinks of alcohol     Types: 2 Glasses of wine, 4 Standard drinks or equivalent per week     Comment: Typically only socially on weekenda   • Drug use: Never   • Sexual activity: Yes     Partners: Male     Birth control/protection: Coitus interruptus, OCP     Comment: current partner of 8 yrs   Other Topics Concern   • None   Social History Narrative   • None     Social Determinants of Health     Financial Resource Strain: Not on file   Food Insecurity: Not on file   Transportation Needs: Not on file   Physical Activity: Not on file   Stress: Not on file   Social Connections: Not on file   Intimate Partner Violence: Not on file   Housing Stability: Not on file     Current Outpatient Medications on File Prior to Visit   Medication Sig   • cholecalciferol (VITAMIN D3) 1,000 units tablet Take 5,000 Units by mouth daily   • cyanocobalamin (VITAMIN B-12) 100 mcg tablet Take by mouth daily   • ferrous sulfate 325 (65 Fe) mg tablet Take 325 mg by mouth daily with breakfast   • fexofenadine (ALLEGRA) 180 MG tablet Take 180 mg by mouth   • fluticasone (FLONASE) 50 mcg/act nasal spray SPRAY 2 SPRAYS INTO EACH NOSTRIL EVERY DAY   • ondansetron (ZOFRAN) 4 mg tablet Take 1 tablet (4 mg total) by mouth every 8 (eight) hours as needed for nausea or vomiting   • pantoprazole (PROTONIX) 40 mg tablet TAKE 1 TABLET BY MOUTH DAILY BEFORE BREAKFAST   • [DISCONTINUED] amphetamine-dextroamphetamine (ADDERALL) 10 mg tablet Take 1 tablet (10 mg total) by mouth 2 (two) times a day Max Daily Amount: 20 mg   • ALPRAZolam (XANAX) 0.25 mg tablet Take 0.25 mg by mouth daily   • Semaglutide-Weight Management (WEGOVY) 1.7 MG/0.75ML Inject 0.75 mL (1.7 mg total) under the skin once a week   • [DISCONTINUED] Slynd 4 MG TABS Take 1 tablet by mouth daily     Allergies   Allergen Reactions   • Sulfa Antibiotics GI Intolerance and Other (See  "Comments)     Abdominal pain       Immunization History   Administered Date(s) Administered   • COVID-19 PFIZER VACCINE 0.3 ML IM 04/16/2021, 05/07/2021   • DTaP / HiB 1994, 02/27/1995, 03/28/1995   • DTaP / HiB / IPV 01/04/1995, 03/10/1995, 05/12/1995, 08/26/1995, 08/26/1999   • DTaP 5 09/13/1996, 11/18/1998   • HPV Quadrivalent 05/28/2008, 08/01/2008, 11/12/2009, 02/06/2012, 04/12/2012, 07/02/2013   • Hep B, Adolescent or Pediatric [birth year], 03/28/1995   • Hep B, adult 1996, 01/04/1995, 03/12/1995, 11/12/2009   • HiB 04/03/1996   • INFLUENZA 09/24/2018, 09/24/2018, 09/10/2021, 09/10/2021, 09/05/2023   • MMR 04/03/1996, 11/18/1998, 08/26/1999, 11/30/1999   • Meningococcal 07/25/2012   • Meningococcal ACWY, unspecified 05/21/2007, 07/02/2013   • Meningococcal Conjugate (MCV4O) 07/25/2012   • Meningococcal MCV4P 05/21/2007, 07/02/2013   • Meningococcal, Unknown Serogroups 07/25/2012   • OPV 1996, 01/04/1995, 02/27/1995, 03/10/1995, 03/28/1995, 09/13/1996, 11/18/1998, 08/26/1999   • Tdap 08/30/2006, 03/06/2008, 07/23/2016   • Varicella 04/03/1996, 05/23/2005, 03/06/2008, 10/18/2010       Objective     /62 (BP Location: Left arm, Patient Position: Sitting, Cuff Size: Adult)   Pulse 74   Temp 97.5 °F (36.4 °C) (Tympanic)   Resp 16   Ht 5' 7\" (1.702 m)   Wt 62.1 kg (137 lb)   SpO2 98%   BMI 21.46 kg/m²     Physical Exam  Vitals and nursing note reviewed.   Constitutional:       Appearance: She is well-developed.   HENT:      Head: Normocephalic and atraumatic.   Eyes:      Pupils: Pupils are equal, round, and reactive to light.   Cardiovascular:      Rate and Rhythm: Normal rate and regular rhythm.      Heart sounds: Normal heart sounds.   Pulmonary:      Effort: Pulmonary effort is normal.      Breath sounds: Normal breath sounds.   Abdominal:      General: Bowel sounds are normal.      Palpations: Abdomen is soft.   Musculoskeletal:      Cervical back: Normal range of motion " and neck supple.   Lymphadenopathy:      Cervical: No cervical adenopathy.   Skin:     General: Skin is warm.   Neurological:      Mental Status: She is alert and oriented to person, place, and time.       Denys Morrow MD

## 2024-02-16 NOTE — PATIENT PROFILE ADULT. - PRO SERVICES AT [facility]
Prior Auth Request for Levemir Insulin  last Rx 1/29/24    Diagnosis:  Type 2 DM with stable proliferative retinopathy of right eye, with long-term current use of insulin    E11.3551, Z79.4  
Submitted PA for insulin detemir (LEVEMIR FLEXPEN) 100 UNIT/ML injection pen     Via CMM (Key: [id number]) STATUS: PENDING.    Follow up done daily; if no decision with in three days we will refax.  If another three days goes by with no decision will call the insurance for status.    
The medication is APPROVED thru 02/14/25    If this requires a response please respond to the pool ( P [facility] MEDICATION PRE-AUTH).      Thank you please advise patient.    
none

## 2024-04-07 ENCOUNTER — EMERGENCY (EMERGENCY)
Facility: HOSPITAL | Age: 29
LOS: 1 days | Discharge: HOME | End: 2024-04-07
Attending: EMERGENCY MEDICINE
Payer: COMMERCIAL

## 2024-04-07 VITALS
SYSTOLIC BLOOD PRESSURE: 111 MMHG | WEIGHT: 142.64 LBS | RESPIRATION RATE: 17 BRPM | OXYGEN SATURATION: 97 % | DIASTOLIC BLOOD PRESSURE: 68 MMHG | HEART RATE: 81 BPM | TEMPERATURE: 97 F

## 2024-04-07 NOTE — ED PROVIDER NOTE - PATIENT PORTAL LINK FT
You can access the FollowMyHealth Patient Portal offered by [facility] by registering at the following website: [email or web address]. By joining [facility]’s FollowMyHealth portal, you will also be able to view your health information using other applications (apps) compatible with our system.

## 2024-04-07 NOTE — ED PROVIDER NOTE - OBJECTIVE STATEMENT
28-year-old male comes to the ED history of gastritis recent workup at [place] with positive H. pylori presently on medications.  Patient states he has 1 more week of antibiotics.  Patient states he has similar type type of pain in his epigastric area.  Patient noted tolerating beef for dinner last night.  Patient denies any vomiting fever or chills at this time.

## 2024-04-07 NOTE — ED PROVIDER NOTE - MUSCULOSKELETAL NEGATIVE STATEMENT, MLM
"OCHSNER Josselin Barrientos, PTA     no back pain, no gout, no musculoskeletal pain, no neck pain, and no weakness.

## 2024-05-15 PROBLEM — F19.10 OTHER PSYCHOACTIVE SUBSTANCE ABUSE, UNCOMPLICATED: Chronic | Status: ACTIVE | Noted: 2023-03-06

## 2024-05-15 NOTE — ED ADULT NURSE REASSESSMENT NOTE - NS ED NURSE REASSESS COMMENT FT1
PT not cooperating with keeping arm straight to receive IV fluids. PT urinating all over floor despite having urinal at bedside..

## 2024-05-15 NOTE — ED PROVIDER NOTE - OBJECTIVE STATEMENT
28-year-old male presents via ambulance due to complaints of abdominal pain.  On arrival patient is yelling, screaming, combative with staff.  Unable to obtain adequate history.  EMS team states patient is well-known to them for history of substance abuse.

## 2024-05-15 NOTE — ED ADULT TRIAGE NOTE - CHIEF COMPLAINT QUOTE
BIBA for abd pain, N/V. Upon arrival patient altered and became combative with staff. MD called to BS. n drugs or alcohol.

## 2024-05-15 NOTE — ED ADULT NURSE REASSESSMENT NOTE - NS ED NURSE REASSESS COMMENT FT1
PT screaming and being combative towards staff. Sticking fingers down throat inducing vomiting. Spitting all over floor. PT throwing self on ground. Dr. Feng made aware, ordered to discharge and remove patient from ED with security. all belongings returned to patient and IV removed.

## 2024-05-15 NOTE — ED PROVIDER NOTE - CLINICAL SUMMARY MEDICAL DECISION MAKING FREE TEXT BOX
28-year-old male presents via ambulance due to complaints of abdominal pain.  On arrival patient is yelling, screaming, combative with staff.  Unable to obtain adequate history.  EMS team states patient is well-known to them for history of substance abuse.     Patient medicated to address his agitation given lack of response to threat of force and attempts to redirect him multiple times.   Given patient's abnormal behavior and signs of head trauma prior to CAT scan ordered as well as labs to evaluate for signs of acute intoxication or other abnormality.    Labs and CAT scan are as noted.  Patient is more awake however still acting appropriate with yelling, screaming, vomiting, and stating he has abdominal pain.  IV hydration ordered, medication to address some of his agitation, and CT abdomen is pending.

## 2024-05-15 NOTE — ED ADULT NURSE REASSESSMENT NOTE - NS ED NURSE REASSESS COMMENT FT1
Assumed care of patient at 0730. Patient on cardiac monitor in sinus rhythm  @ 100% 2L via N/C. Patient woke up screaming c/o abdominal pain, sticking fist down his throat to induce vomiting, patient became violent with staff Dr Maloney called to bedside, medicated as per orders

## 2024-05-15 NOTE — ED ADULT NURSE REASSESSMENT NOTE - NS ED NURSE REASSESS COMMENT FT1
Patient woke up screaming with combative behavior, kicking, swinging, spitting. ED Attending at the bedside, medicated as per orders. Cardiac monitor in place

## 2024-05-15 NOTE — ED PROVIDER NOTE - PROGRESS NOTE DETAILS
pt with known h/o heroin and alcohol abuse   c/o abdominal pain , CT negative , pepcid  and methadone 10 mgPO ,   pt awake alert ,   will be discharged

## 2024-05-15 NOTE — ED PROVIDER NOTE - SKIN, MLM
Skin normal color for race, warm, dry and intact. No evidence of rash. bruise and abrasion (1cm above the right)

## 2024-05-15 NOTE — ED PROVIDER NOTE - ENMT, MLM
Airway patent, Nasal mucosa clear. Mouth with normal mucosa. Throat has no vesicles, no oropharyngeal exudates and uvula is midline. poor dentition

## 2024-05-15 NOTE — ED PROVIDER NOTE - NSICDXPASTMEDICALHX_GEN_ALL_CORE_FT
PAST MEDICAL HISTORY:  Drug abuse     ETOH abuse     Gastritis     Gastritis     No pertinent past medical history     Pancreatitis     Polysubstance abuse     Reflux esophagitis

## 2024-05-27 NOTE — ED PROVIDER NOTE - CARE PROVIDER_API CALL
Noe Lugo  Podiatric Medicine and Surgery  [address] [place] [id number]  Phone: [phone]  Fax: [phone]  Follow Up Time:

## 2024-05-27 NOTE — ED ADULT NURSE NOTE - CAS ELECT INFOMATION PROVIDED
Patient discharged by provider MD Can. No signs of acute distress noted, respirations even and unlabored. Refer to provider notes./DC instructions

## 2024-05-27 NOTE — ED PROVIDER NOTE - PATIENT PORTAL LINK FT
You can access the FollowMyHealth Patient Portal offered by Health system by registering at the following website: [email or web address]. By joining [facility]’s FollowMyHealth portal, you will also be able to view your health information using other applications (apps) compatible with our system.

## 2024-05-27 NOTE — ED PROVIDER NOTE - OBJECTIVE STATEMENT
28-year-old male past medical history of polysubstance abuse comes to the ED with complaint of bilateral foot pain.  Patient denies any fever chills nausea vomiting  Or trauma.  Patient is a poor historian.

## 2024-05-27 NOTE — ED ADULT NURSE REASSESSMENT NOTE - NS ED NURSE REASSESS COMMENT FT1
Patient discharged by provider MD Can prior to RN assessment. No signs of acute distress noted, respirations even and unlabored. Refer to provider notes.

## 2024-06-22 LAB
ALBUMIN SERPL ELPH-MCNC: 2.8 G/DL — LOW (ref 3.3–5.2)
ALP SERPL-CCNC: 88 U/L — SIGNIFICANT CHANGE UP (ref 40–120)
ALT FLD-CCNC: 86 U/L — HIGH
ANION GAP SERPL CALC-SCNC: 5 MMOL/L — SIGNIFICANT CHANGE UP (ref 5–17)
ANION GAP SERPL CALC-SCNC: 9 MMOL/L — SIGNIFICANT CHANGE UP (ref 5–17)
ANION GAP SERPL CALC-SCNC: 9 MMOL/L — SIGNIFICANT CHANGE UP (ref 5–17)
AST SERPL-CCNC: 116 U/L — HIGH
BASOPHILS # BLD AUTO: 0.06 K/UL — SIGNIFICANT CHANGE UP (ref 0–0.2)
BASOPHILS NFR BLD AUTO: 0.2 % — SIGNIFICANT CHANGE UP (ref 0–2)
BILIRUB SERPL-MCNC: 0.5 MG/DL — SIGNIFICANT CHANGE UP (ref 0.4–2)
BUN SERPL-MCNC: 15.8 MG/DL — SIGNIFICANT CHANGE UP (ref 8–20)
BUN SERPL-MCNC: 15.9 MG/DL — SIGNIFICANT CHANGE UP (ref 8–20)
BUN SERPL-MCNC: 16.4 MG/DL — SIGNIFICANT CHANGE UP (ref 8–20)
CALCIUM SERPL-MCNC: 8.5 MG/DL — SIGNIFICANT CHANGE UP (ref 8.4–10.5)
CALCIUM SERPL-MCNC: 8.5 MG/DL — SIGNIFICANT CHANGE UP (ref 8.4–10.5)
CALCIUM SERPL-MCNC: 8.6 MG/DL — SIGNIFICANT CHANGE UP (ref 8.4–10.5)
CHLORIDE SERPL-SCNC: 100 MMOL/L — SIGNIFICANT CHANGE UP (ref 96–108)
CHLORIDE SERPL-SCNC: 98 MMOL/L — SIGNIFICANT CHANGE UP (ref 96–108)
CHLORIDE SERPL-SCNC: 99 MMOL/L — SIGNIFICANT CHANGE UP (ref 96–108)
CO2 SERPL-SCNC: 28 MMOL/L — SIGNIFICANT CHANGE UP (ref 22–29)
CO2 SERPL-SCNC: 29 MMOL/L — SIGNIFICANT CHANGE UP (ref 22–29)
CO2 SERPL-SCNC: 30 MMOL/L — HIGH (ref 22–29)
CREAT SERPL-MCNC: 0.2 MG/DL — LOW (ref 0.5–1.3)
CREAT SERPL-MCNC: 0.21 MG/DL — LOW (ref 0.5–1.3)
CREAT SERPL-MCNC: 0.22 MG/DL — LOW (ref 0.5–1.3)
EGFR: 180 ML/MIN/1.73M2 — SIGNIFICANT CHANGE UP
EGFR: 183 ML/MIN/1.73M2 — SIGNIFICANT CHANGE UP
EGFR: 186 ML/MIN/1.73M2 — SIGNIFICANT CHANGE UP
EOSINOPHIL # BLD AUTO: 0 K/UL — SIGNIFICANT CHANGE UP (ref 0–0.5)
EOSINOPHIL NFR BLD AUTO: 0 % — SIGNIFICANT CHANGE UP (ref 0–6)
GLUCOSE BLDC GLUCOMTR-MCNC: 145 MG/DL — HIGH (ref 70–99)
GLUCOSE SERPL-MCNC: 178 MG/DL — HIGH (ref 70–99)
GLUCOSE SERPL-MCNC: 209 MG/DL — HIGH (ref 70–99)
GLUCOSE SERPL-MCNC: 211 MG/DL — HIGH (ref 70–99)
HCT VFR BLD CALC: 30.7 % — LOW (ref 39–50)
HGB BLD-MCNC: 10.2 G/DL — LOW (ref 13–17)
IMM GRANULOCYTES NFR BLD AUTO: 1.2 % — HIGH (ref 0–0.9)
LYMPHOCYTES # BLD AUTO: 1.57 K/UL — SIGNIFICANT CHANGE UP (ref 1–3.3)
LYMPHOCYTES # BLD AUTO: 4.2 % — LOW (ref 13–44)
MAGNESIUM SERPL-MCNC: 1.7 MG/DL — SIGNIFICANT CHANGE UP (ref 1.6–2.6)
MAGNESIUM SERPL-MCNC: 1.8 MG/DL — SIGNIFICANT CHANGE UP (ref 1.6–2.6)
MAGNESIUM SERPL-MCNC: 2.1 MG/DL — SIGNIFICANT CHANGE UP (ref 1.6–2.6)
MCHC RBC-ENTMCNC: 30.9 PG — SIGNIFICANT CHANGE UP (ref 27–34)
MCHC RBC-ENTMCNC: 33.2 GM/DL — SIGNIFICANT CHANGE UP (ref 32–36)
MCV RBC AUTO: 93 FL — SIGNIFICANT CHANGE UP (ref 80–100)
MONOCYTES # BLD AUTO: 1.99 K/UL — HIGH (ref 0–0.9)
MONOCYTES NFR BLD AUTO: 5.4 % — SIGNIFICANT CHANGE UP (ref 2–14)
NEUTROPHILS # BLD AUTO: 32.94 K/UL — HIGH (ref 1.8–7.4)
NEUTROPHILS NFR BLD AUTO: 89 % — HIGH (ref 43–77)
PHOSPHATE SERPL-MCNC: 2.2 MG/DL — LOW (ref 2.4–4.7)
PHOSPHATE SERPL-MCNC: 2.4 MG/DL — SIGNIFICANT CHANGE UP (ref 2.4–4.7)
PHOSPHATE SERPL-MCNC: 2.8 MG/DL — SIGNIFICANT CHANGE UP (ref 2.4–4.7)
PLATELET # BLD AUTO: 235 K/UL — SIGNIFICANT CHANGE UP (ref 150–400)
POTASSIUM SERPL-MCNC: 3.6 MMOL/L — SIGNIFICANT CHANGE UP (ref 3.5–5.3)
POTASSIUM SERPL-MCNC: 3.8 MMOL/L — SIGNIFICANT CHANGE UP (ref 3.5–5.3)
POTASSIUM SERPL-MCNC: 4 MMOL/L — SIGNIFICANT CHANGE UP (ref 3.5–5.3)
POTASSIUM SERPL-SCNC: 3.6 MMOL/L — SIGNIFICANT CHANGE UP (ref 3.5–5.3)
POTASSIUM SERPL-SCNC: 3.8 MMOL/L — SIGNIFICANT CHANGE UP (ref 3.5–5.3)
POTASSIUM SERPL-SCNC: 4 MMOL/L — SIGNIFICANT CHANGE UP (ref 3.5–5.3)
PROT SERPL-MCNC: 5.6 G/DL — LOW (ref 6.6–8.7)
RBC # BLD: 3.3 M/UL — LOW (ref 4.2–5.8)
RBC # FLD: 12.9 % — SIGNIFICANT CHANGE UP (ref 10.3–14.5)
SODIUM SERPL-SCNC: 134 MMOL/L — LOW (ref 135–145)
SODIUM SERPL-SCNC: 136 MMOL/L — SIGNIFICANT CHANGE UP (ref 135–145)
SODIUM SERPL-SCNC: 137 MMOL/L — SIGNIFICANT CHANGE UP (ref 135–145)
VANCOMYCIN TROUGH SERPL-MCNC: 11.9 UG/ML — SIGNIFICANT CHANGE UP (ref 10–20)
WBC # BLD: 37 K/UL — HIGH (ref 3.8–10.5)
WBC # FLD AUTO: 37 K/UL — HIGH (ref 3.8–10.5)

## 2024-06-22 RX ADMIN — Medication 1 APPLICATION(S): at 05:49

## 2024-06-22 RX ADMIN — MEROPENEM 1000 MILLIGRAM(S): 500 INJECTION, POWDER, FOR SOLUTION INTRAVENOUS at 23:58

## 2024-06-22 RX ADMIN — LEVETIRACETAM 500 MILLIGRAM(S): 100 INJECTION INTRAVENOUS at 16:57

## 2024-06-22 RX ADMIN — MEROPENEM 1000 MILLIGRAM(S): 500 INJECTION, POWDER, FOR SOLUTION INTRAVENOUS at 12:19

## 2024-06-22 RX ADMIN — ENOXAPARIN SODIUM 40 MILLIGRAM(S): 100 INJECTION SUBCUTANEOUS at 08:41

## 2024-06-22 RX ADMIN — Medication 15 MILLILITER(S): at 05:39

## 2024-06-22 RX ADMIN — Medication 40 MILLIGRAM(S): at 10:57

## 2024-06-22 RX ADMIN — MEROPENEM 1000 MILLIGRAM(S): 500 INJECTION, POWDER, FOR SOLUTION INTRAVENOUS at 05:39

## 2024-06-22 RX ADMIN — Medication 1 APPLICATION(S): at 16:57

## 2024-06-22 RX ADMIN — PANTOPRAZOLE SODIUM 40 MILLIGRAM(S): 40 INJECTION, POWDER, FOR SOLUTION INTRAVENOUS at 08:41

## 2024-06-22 RX ADMIN — CASPOFUNGIN ACETATE 260 MILLIGRAM(S): 5 INJECTION, POWDER, LYOPHILIZED, FOR SOLUTION INTRAVENOUS at 02:16

## 2024-06-22 RX ADMIN — LEVETIRACETAM 500 MILLIGRAM(S): 100 INJECTION INTRAVENOUS at 05:40

## 2024-06-22 RX ADMIN — VANCOMYCIN HYDROCHLORIDE 166.67 MILLIGRAM(S): KIT at 08:41

## 2024-06-22 RX ADMIN — Medication 1 MILLIGRAM(S): at 12:20

## 2024-06-22 RX ADMIN — Medication 1 APPLICATION(S): at 05:40

## 2024-06-22 RX ADMIN — Medication 2 PUFF(S): at 21:02

## 2024-06-22 RX ADMIN — Medication 15 MILLILITER(S): at 12:20

## 2024-06-22 RX ADMIN — Medication 85 MILLIMOLE(S): at 03:35

## 2024-06-22 RX ADMIN — Medication 40 MILLIGRAM(S): at 12:20

## 2024-06-22 RX ADMIN — Medication 15 MILLILITER(S): at 16:57

## 2024-06-22 RX ADMIN — Medication 2 PUFF(S): at 08:28

## 2024-06-22 RX ADMIN — Medication 40 MILLIGRAM(S): at 05:40

## 2024-06-22 RX ADMIN — VANCOMYCIN HYDROCHLORIDE 166.67 MILLIGRAM(S): KIT at 16:57

## 2024-06-22 RX ADMIN — PHENYLEPHRINE HYDROCHLORIDE 8.17 MICROGRAM(S)/KG/MIN: 10 INJECTION INTRAVENOUS at 06:43

## 2024-06-23 LAB
ANION GAP SERPL CALC-SCNC: 11 MMOL/L — SIGNIFICANT CHANGE UP (ref 5–17)
ANISOCYTOSIS BLD QL: SLIGHT — SIGNIFICANT CHANGE UP
BASOPHILS # BLD AUTO: 0 K/UL — SIGNIFICANT CHANGE UP (ref 0–0.2)
BASOPHILS NFR BLD AUTO: 0 % — SIGNIFICANT CHANGE UP (ref 0–2)
BUN SERPL-MCNC: 17.6 MG/DL — SIGNIFICANT CHANGE UP (ref 8–20)
CALCIUM SERPL-MCNC: 8.6 MG/DL — SIGNIFICANT CHANGE UP (ref 8.4–10.5)
CHLORIDE SERPL-SCNC: 96 MMOL/L — SIGNIFICANT CHANGE UP (ref 96–108)
CO2 SERPL-SCNC: 29 MMOL/L — SIGNIFICANT CHANGE UP (ref 22–29)
CREAT SERPL-MCNC: 0.23 MG/DL — LOW (ref 0.5–1.3)
EGFR: 178 ML/MIN/1.73M2 — SIGNIFICANT CHANGE UP
EOSINOPHIL # BLD AUTO: 0 K/UL — SIGNIFICANT CHANGE UP (ref 0–0.5)
EOSINOPHIL NFR BLD AUTO: 0 % — SIGNIFICANT CHANGE UP (ref 0–6)
GIANT PLATELETS BLD QL SMEAR: PRESENT — SIGNIFICANT CHANGE UP
GLUCOSE SERPL-MCNC: 179 MG/DL — HIGH (ref 70–99)
HCT VFR BLD CALC: 26.9 % — LOW (ref 39–50)
HGB BLD-MCNC: 9.3 G/DL — LOW (ref 13–17)
LACTATE SERPL-SCNC: 1.6 MMOL/L — SIGNIFICANT CHANGE UP (ref 0.5–2)
LYMPHOCYTES # BLD AUTO: 0 % — LOW (ref 13–44)
LYMPHOCYTES # BLD AUTO: 0 K/UL — LOW (ref 1–3.3)
MACROCYTES BLD QL: SLIGHT — SIGNIFICANT CHANGE UP
MAGNESIUM SERPL-MCNC: 1.5 MG/DL — LOW (ref 1.6–2.6)
MANUAL SMEAR VERIFICATION: SIGNIFICANT CHANGE UP
MCHC RBC-ENTMCNC: 31.8 PG — SIGNIFICANT CHANGE UP (ref 27–34)
MCHC RBC-ENTMCNC: 34.6 GM/DL — SIGNIFICANT CHANGE UP (ref 32–36)
MCV RBC AUTO: 92.1 FL — SIGNIFICANT CHANGE UP (ref 80–100)
MONOCYTES # BLD AUTO: 0.96 K/UL — HIGH (ref 0–0.9)
MONOCYTES NFR BLD AUTO: 3.5 % — SIGNIFICANT CHANGE UP (ref 2–14)
NEUTROPHILS # BLD AUTO: 26.47 K/UL — HIGH (ref 1.8–7.4)
NEUTROPHILS NFR BLD AUTO: 94.8 % — HIGH (ref 43–77)
NEUTS BAND # BLD: 1.7 % — SIGNIFICANT CHANGE UP (ref 0–8)
OVALOCYTES BLD QL SMEAR: SLIGHT — SIGNIFICANT CHANGE UP
PHOSPHATE SERPL-MCNC: 1.8 MG/DL — LOW (ref 2.4–4.7)
PLAT MORPH BLD: NORMAL — SIGNIFICANT CHANGE UP
PLATELET # BLD AUTO: 232 K/UL — SIGNIFICANT CHANGE UP (ref 150–400)
POIKILOCYTOSIS BLD QL AUTO: SLIGHT — SIGNIFICANT CHANGE UP
POLYCHROMASIA BLD QL SMEAR: SLIGHT — SIGNIFICANT CHANGE UP
POTASSIUM SERPL-MCNC: 3.3 MMOL/L — LOW (ref 3.5–5.3)
POTASSIUM SERPL-SCNC: 3.3 MMOL/L — LOW (ref 3.5–5.3)
RBC # BLD: 2.92 M/UL — LOW (ref 4.2–5.8)
RBC # FLD: 12.6 % — SIGNIFICANT CHANGE UP (ref 10.3–14.5)
RBC BLD AUTO: ABNORMAL
SODIUM SERPL-SCNC: 136 MMOL/L — SIGNIFICANT CHANGE UP (ref 135–145)
WBC # BLD: 27.43 K/UL — HIGH (ref 3.8–10.5)
WBC # FLD AUTO: 27.43 K/UL — HIGH (ref 3.8–10.5)

## 2024-06-23 RX ORDER — POTASSIUM PHOSPHATE, MONOBASIC POTASSIUM PHOSPHATE, DIBASIC INJECTION, 236; 224 MG/ML; MG/ML
30 SOLUTION, CONCENTRATE INTRAVENOUS ONCE
Refills: 0 | Status: COMPLETED | OUTPATIENT
Start: 2024-06-23 | End: 2024-06-23

## 2024-06-23 RX ORDER — MAGNESIUM SULFATE 100 %
2 POWDER (GRAM) MISCELLANEOUS ONCE
Refills: 0 | Status: COMPLETED | OUTPATIENT
Start: 2024-06-23 | End: 2024-06-23

## 2024-06-23 RX ORDER — POTASSIUM CHLORIDE 600 MG/1
20 TABLET, FILM COATED, EXTENDED RELEASE ORAL
Refills: 0 | Status: COMPLETED | OUTPATIENT
Start: 2024-06-23 | End: 2024-06-23

## 2024-06-23 RX ADMIN — Medication 1 APPLICATION(S): at 05:05

## 2024-06-23 RX ADMIN — Medication 2 PUFF(S): at 20:39

## 2024-06-23 RX ADMIN — MEROPENEM 1000 MILLIGRAM(S): 500 INJECTION, POWDER, FOR SOLUTION INTRAVENOUS at 21:57

## 2024-06-23 RX ADMIN — POTASSIUM CHLORIDE 100 MILLIEQUIVALENT(S): 600 TABLET, FILM COATED, EXTENDED RELEASE ORAL at 08:25

## 2024-06-23 RX ADMIN — Medication 6 UNIT(S)/MIN: at 01:11

## 2024-06-23 RX ADMIN — Medication 15 MILLILITER(S): at 05:04

## 2024-06-23 RX ADMIN — Medication 1 APPLICATION(S): at 17:38

## 2024-06-23 RX ADMIN — LEVETIRACETAM 500 MILLIGRAM(S): 100 INJECTION INTRAVENOUS at 17:38

## 2024-06-23 RX ADMIN — PANTOPRAZOLE SODIUM 40 MILLIGRAM(S): 40 INJECTION, POWDER, FOR SOLUTION INTRAVENOUS at 08:25

## 2024-06-23 RX ADMIN — CASPOFUNGIN ACETATE 260 MILLIGRAM(S): 5 INJECTION, POWDER, LYOPHILIZED, FOR SOLUTION INTRAVENOUS at 02:44

## 2024-06-23 RX ADMIN — VANCOMYCIN HYDROCHLORIDE 166.67 MILLIGRAM(S): KIT at 17:38

## 2024-06-23 RX ADMIN — MEROPENEM 1000 MILLIGRAM(S): 500 INJECTION, POWDER, FOR SOLUTION INTRAVENOUS at 05:04

## 2024-06-23 RX ADMIN — Medication 40 MILLIGRAM(S): at 05:05

## 2024-06-23 RX ADMIN — POTASSIUM CHLORIDE 100 MILLIEQUIVALENT(S): 600 TABLET, FILM COATED, EXTENDED RELEASE ORAL at 06:10

## 2024-06-23 RX ADMIN — VANCOMYCIN HYDROCHLORIDE 166.67 MILLIGRAM(S): KIT at 01:11

## 2024-06-23 RX ADMIN — Medication 40 MILLIGRAM(S): at 11:14

## 2024-06-23 RX ADMIN — ENOXAPARIN SODIUM 40 MILLIGRAM(S): 100 INJECTION SUBCUTANEOUS at 08:24

## 2024-06-23 RX ADMIN — Medication 25 GRAM(S): at 06:10

## 2024-06-23 RX ADMIN — Medication 40 MILLIGRAM(S): at 21:57

## 2024-06-23 RX ADMIN — Medication 1 MILLIGRAM(S): at 11:14

## 2024-06-23 RX ADMIN — LEVETIRACETAM 500 MILLIGRAM(S): 100 INJECTION INTRAVENOUS at 05:04

## 2024-06-23 RX ADMIN — VANCOMYCIN HYDROCHLORIDE 166.67 MILLIGRAM(S): KIT at 08:23

## 2024-06-23 RX ADMIN — VANCOMYCIN HYDROCHLORIDE 166.67 MILLIGRAM(S): KIT at 23:46

## 2024-06-23 RX ADMIN — MEROPENEM 1000 MILLIGRAM(S): 500 INJECTION, POWDER, FOR SOLUTION INTRAVENOUS at 11:14

## 2024-06-23 RX ADMIN — Medication 15 MILLILITER(S): at 11:14

## 2024-06-23 RX ADMIN — Medication 15 MILLILITER(S): at 17:38

## 2024-06-23 RX ADMIN — Medication 2 PUFF(S): at 08:38

## 2024-06-23 RX ADMIN — POTASSIUM PHOSPHATE, MONOBASIC POTASSIUM PHOSPHATE, DIBASIC INJECTION, 83.33 MILLIMOLE(S): 236; 224 SOLUTION, CONCENTRATE INTRAVENOUS at 06:55

## 2024-06-24 VITALS — HEART RATE: 28 BPM | TEMPERATURE: 97 F

## 2024-06-24 PROCEDURE — 86789 WEST NILE VIRUS ANTIBODY: CPT

## 2024-06-24 PROCEDURE — 74176 CT ABD & PELVIS W/O CONTRAST: CPT | Mod: MC

## 2024-06-24 PROCEDURE — 83615 LACTATE (LD) (LDH) ENZYME: CPT

## 2024-06-24 PROCEDURE — 86592 SYPHILIS TEST NON-TREP QUAL: CPT

## 2024-06-24 PROCEDURE — 84100 ASSAY OF PHOSPHORUS: CPT

## 2024-06-24 PROCEDURE — 80053 COMPREHEN METABOLIC PANEL: CPT

## 2024-06-24 PROCEDURE — 84156 ASSAY OF PROTEIN URINE: CPT

## 2024-06-24 PROCEDURE — 86901 BLOOD TYPING SEROLOGIC RH(D): CPT

## 2024-06-24 PROCEDURE — 86850 RBC ANTIBODY SCREEN: CPT

## 2024-06-24 PROCEDURE — 87205 SMEAR GRAM STAIN: CPT

## 2024-06-24 PROCEDURE — 82803 BLOOD GASES ANY COMBINATION: CPT

## 2024-06-24 PROCEDURE — 99292 CRITICAL CARE ADDL 30 MIN: CPT

## 2024-06-24 PROCEDURE — 93970 EXTREMITY STUDY: CPT

## 2024-06-24 PROCEDURE — 87385 HISTOPLASMA CAPSUL AG IA: CPT

## 2024-06-24 PROCEDURE — 87086 URINE CULTURE/COLONY COUNT: CPT

## 2024-06-24 PROCEDURE — 85014 HEMATOCRIT: CPT

## 2024-06-24 PROCEDURE — 94003 VENT MGMT INPAT SUBQ DAY: CPT

## 2024-06-24 PROCEDURE — 84478 ASSAY OF TRIGLYCERIDES: CPT

## 2024-06-24 PROCEDURE — 95714 VEEG EA 12-26 HR UNMNTR: CPT

## 2024-06-24 PROCEDURE — 82962 GLUCOSE BLOOD TEST: CPT

## 2024-06-24 PROCEDURE — 80164 ASSAY DIPROPYLACETIC ACD TOT: CPT

## 2024-06-24 PROCEDURE — 36600 WITHDRAWAL OF ARTERIAL BLOOD: CPT

## 2024-06-24 PROCEDURE — 85025 COMPLETE CBC W/AUTO DIFF WBC: CPT

## 2024-06-24 PROCEDURE — 80307 DRUG TEST PRSMV CHEM ANLYZR: CPT

## 2024-06-24 PROCEDURE — 87070 CULTURE OTHR SPECIMN AEROBIC: CPT

## 2024-06-24 PROCEDURE — 87102 FUNGUS ISOLATION CULTURE: CPT

## 2024-06-24 PROCEDURE — 82140 ASSAY OF AMMONIA: CPT

## 2024-06-24 PROCEDURE — 84540 ASSAY OF URINE/UREA-N: CPT

## 2024-06-24 PROCEDURE — 94760 N-INVAS EAR/PLS OXIMETRY 1: CPT

## 2024-06-24 PROCEDURE — 99291 CRITICAL CARE FIRST HOUR: CPT | Mod: 25

## 2024-06-24 PROCEDURE — T1013: CPT

## 2024-06-24 PROCEDURE — 80048 BASIC METABOLIC PNL TOTAL CA: CPT

## 2024-06-24 PROCEDURE — 87186 SC STD MICRODIL/AGAR DIL: CPT

## 2024-06-24 PROCEDURE — 86788 WEST NILE VIRUS AB IGM: CPT

## 2024-06-24 PROCEDURE — 87641 MR-STAPH DNA AMP PROBE: CPT

## 2024-06-24 PROCEDURE — 87077 CULTURE AEROBIC IDENTIFY: CPT

## 2024-06-24 PROCEDURE — 84133 ASSAY OF URINE POTASSIUM: CPT

## 2024-06-24 PROCEDURE — 94640 AIRWAY INHALATION TREATMENT: CPT

## 2024-06-24 PROCEDURE — 87015 SPECIMEN INFECT AGNT CONCNTJ: CPT

## 2024-06-24 PROCEDURE — 95711 VEEG 2-12 HR UNMONITORED: CPT

## 2024-06-24 PROCEDURE — 84443 ASSAY THYROID STIM HORMONE: CPT

## 2024-06-24 PROCEDURE — 71250 CT THORAX DX C-: CPT | Mod: MC

## 2024-06-24 PROCEDURE — 87206 SMEAR FLUORESCENT/ACID STAI: CPT

## 2024-06-24 PROCEDURE — 96375 TX/PRO/DX INJ NEW DRUG ADDON: CPT | Mod: XU

## 2024-06-24 PROCEDURE — 83605 ASSAY OF LACTIC ACID: CPT

## 2024-06-24 PROCEDURE — 82330 ASSAY OF CALCIUM: CPT

## 2024-06-24 PROCEDURE — 82570 ASSAY OF URINE CREATININE: CPT

## 2024-06-24 PROCEDURE — 85027 COMPLETE CBC AUTOMATED: CPT

## 2024-06-24 PROCEDURE — 31500 INSERT EMERGENCY AIRWAY: CPT

## 2024-06-24 PROCEDURE — 89051 BODY FLUID CELL COUNT: CPT

## 2024-06-24 PROCEDURE — 81003 URINALYSIS AUTO W/O SCOPE: CPT

## 2024-06-24 PROCEDURE — 83930 ASSAY OF BLOOD OSMOLALITY: CPT

## 2024-06-24 PROCEDURE — 84132 ASSAY OF SERUM POTASSIUM: CPT

## 2024-06-24 PROCEDURE — 84295 ASSAY OF SERUM SODIUM: CPT

## 2024-06-24 PROCEDURE — 87449 NOS EACH ORGANISM AG IA: CPT

## 2024-06-24 PROCEDURE — 84157 ASSAY OF PROTEIN OTHER: CPT

## 2024-06-24 PROCEDURE — 86403 PARTICLE AGGLUT ANTBDY SCRN: CPT

## 2024-06-24 PROCEDURE — P9047: CPT

## 2024-06-24 PROCEDURE — 82435 ASSAY OF BLOOD CHLORIDE: CPT

## 2024-06-24 PROCEDURE — 87538 HIV-2 PROBE&REVRSE TRNSCRIPJ: CPT

## 2024-06-24 PROCEDURE — 87040 BLOOD CULTURE FOR BACTERIA: CPT

## 2024-06-24 PROCEDURE — 95700 EEG CONT REC W/VID EEG TECH: CPT

## 2024-06-24 PROCEDURE — 87799 DETECT AGENT NOS DNA QUANT: CPT

## 2024-06-24 PROCEDURE — 80202 ASSAY OF VANCOMYCIN: CPT

## 2024-06-24 PROCEDURE — 82947 ASSAY GLUCOSE BLOOD QUANT: CPT

## 2024-06-24 PROCEDURE — 87640 STAPH A DNA AMP PROBE: CPT

## 2024-06-24 PROCEDURE — 81001 URINALYSIS AUTO W/SCOPE: CPT

## 2024-06-24 PROCEDURE — 80074 ACUTE HEPATITIS PANEL: CPT

## 2024-06-24 PROCEDURE — 86617 LYME DISEASE ANTIBODY: CPT

## 2024-06-24 PROCEDURE — 82945 GLUCOSE OTHER FLUID: CPT

## 2024-06-24 PROCEDURE — 80061 LIPID PANEL: CPT

## 2024-06-24 PROCEDURE — 70450 CT HEAD/BRAIN W/O DYE: CPT | Mod: MC

## 2024-06-24 PROCEDURE — 93306 TTE W/DOPPLER COMPLETE: CPT

## 2024-06-24 PROCEDURE — 74018 RADEX ABDOMEN 1 VIEW: CPT

## 2024-06-24 PROCEDURE — 93005 ELECTROCARDIOGRAM TRACING: CPT

## 2024-06-24 PROCEDURE — 86900 BLOOD TYPING SEROLOGIC ABO: CPT

## 2024-06-24 PROCEDURE — 84300 ASSAY OF URINE SODIUM: CPT

## 2024-06-24 PROCEDURE — 87476 LYME DIS DNA AMP PROBE: CPT

## 2024-06-24 PROCEDURE — 86703 HIV-1/HIV-2 1 RESULT ANTBDY: CPT

## 2024-06-24 PROCEDURE — 82553 CREATINE MB FRACTION: CPT

## 2024-06-24 PROCEDURE — 83735 ASSAY OF MAGNESIUM: CPT

## 2024-06-24 PROCEDURE — 87483 CNS DNA AMP PROBE TYPE 12-25: CPT

## 2024-06-24 PROCEDURE — 71045 X-RAY EXAM CHEST 1 VIEW: CPT

## 2024-06-24 PROCEDURE — 87116 MYCOBACTERIA CULTURE: CPT

## 2024-06-24 PROCEDURE — 96374 THER/PROPH/DIAG INJ IV PUSH: CPT

## 2024-06-24 PROCEDURE — 84145 PROCALCITONIN (PCT): CPT

## 2024-06-24 PROCEDURE — 94002 VENT MGMT INPAT INIT DAY: CPT

## 2024-06-24 PROCEDURE — 85018 HEMOGLOBIN: CPT

## 2024-06-24 PROCEDURE — 82550 ASSAY OF CK (CPK): CPT

## 2024-06-24 PROCEDURE — 96372 THER/PROPH/DIAG INJ SC/IM: CPT | Mod: XU

## 2024-06-24 PROCEDURE — 83935 ASSAY OF URINE OSMOLALITY: CPT

## 2024-06-24 PROCEDURE — 36415 COLL VENOUS BLD VENIPUNCTURE: CPT

## 2024-06-24 RX ADMIN — Medication 2 PUFF(S): at 08:21

## 2024-06-24 RX ADMIN — Medication 15 MILLILITER(S): at 05:13

## 2024-06-24 RX ADMIN — MEROPENEM 1000 MILLIGRAM(S): 500 INJECTION, POWDER, FOR SOLUTION INTRAVENOUS at 13:17

## 2024-06-24 RX ADMIN — MEROPENEM 1000 MILLIGRAM(S): 500 INJECTION, POWDER, FOR SOLUTION INTRAVENOUS at 05:13

## 2024-06-24 RX ADMIN — Medication 40 MILLIGRAM(S): at 05:13

## 2024-06-24 RX ADMIN — PANTOPRAZOLE SODIUM 40 MILLIGRAM(S): 40 INJECTION, POWDER, FOR SOLUTION INTRAVENOUS at 09:51

## 2024-06-24 RX ADMIN — Medication 40 MILLIGRAM(S): at 13:17

## 2024-06-24 RX ADMIN — CASPOFUNGIN ACETATE 260 MILLIGRAM(S): 5 INJECTION, POWDER, LYOPHILIZED, FOR SOLUTION INTRAVENOUS at 01:43

## 2024-06-24 RX ADMIN — ENOXAPARIN SODIUM 40 MILLIGRAM(S): 100 INJECTION SUBCUTANEOUS at 09:51

## 2024-06-24 RX ADMIN — Medication 1 APPLICATION(S): at 05:14

## 2024-06-24 RX ADMIN — LEVETIRACETAM 500 MILLIGRAM(S): 100 INJECTION INTRAVENOUS at 05:13

## 2024-07-10 LAB
CULTURE RESULTS: SIGNIFICANT CHANGE UP
SPECIMEN SOURCE: SIGNIFICANT CHANGE UP

## 2025-02-07 NOTE — ED ADULT TRIAGE NOTE - HEIGHT IN INCHES
FAMILY HISTORY:  FH: HTN (hypertension)    Grandparent  Still living? Unknown  Family history of diabetes mellitus (DM), Age at diagnosis: Age Unknown     8

## 2025-06-18 NOTE — ED ADULT TRIAGE NOTE - CCCP TRG CHIEF CMPLNT
Courtesy Patient Transportation Assessment:  Is the patient seeking and/or receiving medically necessary services? Yes   Patient cannot secure a ride on their own (e.g. self, family, friend, community organization, azam)? Yes   Patient cannot financially afford a ride-share or taxi? Yes   No insurance benefit is available? Yes   Residence is within a 25-mile radius of the [facility] facility (unless transporting to residence post inpatient stay or 24 hours+ observation stay)? Yes     If all answers above are 'yes' then this patient does qualify for [employer] Patient Transportation assistance.   Patient was instructed to carry mobile phone with them on day of transportation.   Restrictions:  Only one caregiver may accompany the patient  There can be no stops in between the [facility] facility and the patients residence.  Qualification must be assessed each visit to accommodate for changing circumstances.    Scheduled transportation   Date & Time: 6/18/25 @ 9157          abdominal pain

## 2025-08-01 NOTE — ED ADULT NURSE NOTE - PRIMARY CARE PROVIDER
Pt arrived to ED via EMS w/CC headache and hypertension from [facility]. Woke up this morning c/o headache to bilateral temples. Staff took pts BP and noted SBP to be 180's-200. Has hx HTN and takes meds for. Took meds this AM. A&Ox2-3. BEFAST negative.    Does not have PCP